# Patient Record
Sex: FEMALE | Race: WHITE | NOT HISPANIC OR LATINO | Employment: FULL TIME | URBAN - METROPOLITAN AREA
[De-identification: names, ages, dates, MRNs, and addresses within clinical notes are randomized per-mention and may not be internally consistent; named-entity substitution may affect disease eponyms.]

---

## 2017-02-27 ENCOUNTER — TRANSCRIBE ORDERS (OUTPATIENT)
Dept: ADMINISTRATIVE | Age: 27
End: 2017-02-27

## 2017-02-27 ENCOUNTER — APPOINTMENT (OUTPATIENT)
Dept: LAB | Age: 27
End: 2017-02-27
Attending: PREVENTIVE MEDICINE
Payer: COMMERCIAL

## 2017-02-27 DIAGNOSIS — Z01.84 IMMUNITY STATUS TESTING: ICD-10-CM

## 2017-02-27 DIAGNOSIS — Z01.84 IMMUNITY STATUS TESTING: Primary | ICD-10-CM

## 2017-02-27 LAB — RUBV IGG SERPL IA-ACNC: 16.4 IU/ML

## 2017-02-27 PROCEDURE — 86765 RUBEOLA ANTIBODY: CPT

## 2017-02-27 PROCEDURE — 86762 RUBELLA ANTIBODY: CPT

## 2017-02-27 PROCEDURE — 86706 HEP B SURFACE ANTIBODY: CPT

## 2017-02-27 PROCEDURE — 86735 MUMPS ANTIBODY: CPT

## 2017-02-27 PROCEDURE — 36415 COLL VENOUS BLD VENIPUNCTURE: CPT

## 2017-02-27 PROCEDURE — 86787 VARICELLA-ZOSTER ANTIBODY: CPT

## 2017-02-28 LAB
HBV SURFACE AB SER-ACNC: 5.32 MIU/ML
MEV IGG SER QL: ABNORMAL
MUV IGG SER QL: ABNORMAL
VZV IGG SER IA-ACNC: NORMAL

## 2017-05-11 ENCOUNTER — GENERIC CONVERSION - ENCOUNTER (OUTPATIENT)
Dept: OTHER | Facility: OTHER | Age: 27
End: 2017-05-11

## 2017-06-07 ENCOUNTER — GENERIC CONVERSION - ENCOUNTER (OUTPATIENT)
Dept: OTHER | Facility: OTHER | Age: 27
End: 2017-06-07

## 2017-07-10 ENCOUNTER — ALLSCRIPTS OFFICE VISIT (OUTPATIENT)
Dept: OTHER | Facility: OTHER | Age: 27
End: 2017-07-10

## 2017-07-10 DIAGNOSIS — I10 ESSENTIAL (PRIMARY) HYPERTENSION: ICD-10-CM

## 2017-07-10 DIAGNOSIS — M79.661 PAIN OF RIGHT LOWER LEG: ICD-10-CM

## 2017-07-11 ENCOUNTER — GENERIC CONVERSION - ENCOUNTER (OUTPATIENT)
Dept: OTHER | Facility: OTHER | Age: 27
End: 2017-07-11

## 2017-07-11 ENCOUNTER — HOSPITAL ENCOUNTER (OUTPATIENT)
Dept: NON INVASIVE DIAGNOSTICS | Facility: HOSPITAL | Age: 27
Discharge: HOME | End: 2017-07-11
Payer: COMMERCIAL

## 2017-07-11 DIAGNOSIS — M79.661 PAIN OF RIGHT LOWER LEG: ICD-10-CM

## 2017-07-11 LAB
ADDITIONAL INFORMATION (HISTORICAL): NORMAL
ADEQUACY: (HISTORICAL): NORMAL
COMMENT (HISTORICAL): NORMAL
CYTOTECHNOLOGIST: (HISTORICAL): NORMAL
GENERAL CATEGORIZATION (HISTORICAL): NORMAL
HPV HIGH RISK (HISTORICAL): NORMAL
HPV MRNA E6/E7 (HISTORICAL): NORMAL
INFECTION (HISTORICAL): NORMAL
INTERPRETATION (HISTORICAL): NORMAL
LMP (HISTORICAL): NORMAL
PATHOLOGIST (HISTORICAL): NORMAL
PREV. BX: (HISTORICAL): NORMAL
PREV. PAP (HISTORICAL): NORMAL
REVIEWED BY (HISTORICAL): NORMAL
SOURCE (HISTORICAL): NORMAL
SPECIMEN STATUS REPORT (HISTORICAL): NORMAL

## 2017-07-11 PROCEDURE — 93971 EXTREMITY STUDY: CPT

## 2017-07-15 ENCOUNTER — APPOINTMENT (OUTPATIENT)
Dept: LAB | Age: 27
End: 2017-07-15
Payer: COMMERCIAL

## 2017-07-15 ENCOUNTER — TRANSCRIBE ORDERS (OUTPATIENT)
Dept: ADMINISTRATIVE | Age: 27
End: 2017-07-15

## 2017-07-15 DIAGNOSIS — I10 ESSENTIAL (PRIMARY) HYPERTENSION: ICD-10-CM

## 2017-07-15 LAB
ALBUMIN SERPL BCP-MCNC: 3.7 G/DL (ref 3.5–5)
ALP SERPL-CCNC: 75 U/L (ref 46–116)
ALT SERPL W P-5'-P-CCNC: 77 U/L (ref 12–78)
ANION GAP SERPL CALCULATED.3IONS-SCNC: 8 MMOL/L (ref 4–13)
AST SERPL W P-5'-P-CCNC: 48 U/L (ref 5–45)
BASOPHILS # BLD AUTO: 0.02 THOUSANDS/ΜL (ref 0–0.1)
BASOPHILS NFR BLD AUTO: 0 % (ref 0–1)
BILIRUB SERPL-MCNC: 0.42 MG/DL (ref 0.2–1)
BUN SERPL-MCNC: 11 MG/DL (ref 5–25)
CALCIUM SERPL-MCNC: 8.9 MG/DL (ref 8.3–10.1)
CHLORIDE SERPL-SCNC: 107 MMOL/L (ref 100–108)
CHOLEST SERPL-MCNC: 164 MG/DL (ref 50–200)
CO2 SERPL-SCNC: 23 MMOL/L (ref 21–32)
CREAT SERPL-MCNC: 0.67 MG/DL (ref 0.6–1.3)
EOSINOPHIL # BLD AUTO: 0.16 THOUSAND/ΜL (ref 0–0.61)
EOSINOPHIL NFR BLD AUTO: 2 % (ref 0–6)
ERYTHROCYTE [DISTWIDTH] IN BLOOD BY AUTOMATED COUNT: 14.2 % (ref 11.6–15.1)
GFR SERPL CREATININE-BSD FRML MDRD: >60 ML/MIN/1.73SQ M
GLUCOSE P FAST SERPL-MCNC: 71 MG/DL (ref 65–99)
HCT VFR BLD AUTO: 41.9 % (ref 34.8–46.1)
HDLC SERPL-MCNC: 32 MG/DL (ref 40–60)
HGB BLD-MCNC: 13.7 G/DL (ref 11.5–15.4)
LDLC SERPL CALC-MCNC: 69 MG/DL (ref 0–100)
LYMPHOCYTES # BLD AUTO: 2.56 THOUSANDS/ΜL (ref 0.6–4.47)
LYMPHOCYTES NFR BLD AUTO: 30 % (ref 14–44)
MCH RBC QN AUTO: 28.7 PG (ref 26.8–34.3)
MCHC RBC AUTO-ENTMCNC: 32.7 G/DL (ref 31.4–37.4)
MCV RBC AUTO: 88 FL (ref 82–98)
MONOCYTES # BLD AUTO: 0.39 THOUSAND/ΜL (ref 0.17–1.22)
MONOCYTES NFR BLD AUTO: 5 % (ref 4–12)
NEUTROPHILS # BLD AUTO: 5.28 THOUSANDS/ΜL (ref 1.85–7.62)
NEUTS SEG NFR BLD AUTO: 63 % (ref 43–75)
NRBC BLD AUTO-RTO: 0 /100 WBCS
PLATELET # BLD AUTO: 235 THOUSANDS/UL (ref 149–390)
PMV BLD AUTO: 11.1 FL (ref 8.9–12.7)
POTASSIUM SERPL-SCNC: 4 MMOL/L (ref 3.5–5.3)
PROT SERPL-MCNC: 7.4 G/DL (ref 6.4–8.2)
RBC # BLD AUTO: 4.78 MILLION/UL (ref 3.81–5.12)
SODIUM SERPL-SCNC: 138 MMOL/L (ref 136–145)
TRIGL SERPL-MCNC: 317 MG/DL
TSH SERPL DL<=0.05 MIU/L-ACNC: 2.09 UIU/ML (ref 0.36–3.74)
WBC # BLD AUTO: 8.42 THOUSAND/UL (ref 4.31–10.16)

## 2017-07-15 PROCEDURE — 85025 COMPLETE CBC W/AUTO DIFF WBC: CPT

## 2017-07-15 PROCEDURE — 84443 ASSAY THYROID STIM HORMONE: CPT

## 2017-07-15 PROCEDURE — 36415 COLL VENOUS BLD VENIPUNCTURE: CPT

## 2017-07-15 PROCEDURE — 80061 LIPID PANEL: CPT

## 2017-07-15 PROCEDURE — 80053 COMPREHEN METABOLIC PANEL: CPT

## 2017-07-17 ENCOUNTER — GENERIC CONVERSION - ENCOUNTER (OUTPATIENT)
Dept: OTHER | Facility: OTHER | Age: 27
End: 2017-07-17

## 2017-07-31 ENCOUNTER — ALLSCRIPTS OFFICE VISIT (OUTPATIENT)
Dept: OTHER | Facility: OTHER | Age: 27
End: 2017-07-31

## 2017-10-30 ENCOUNTER — ALLSCRIPTS OFFICE VISIT (OUTPATIENT)
Dept: OTHER | Facility: OTHER | Age: 27
End: 2017-10-30

## 2018-01-10 NOTE — RESULT NOTES
Verified Results  (1) CBC/PLT/DIFF [id number] 10:26AM [facility] Order Number: [id number]     Test Name Result Flag Reference   WBC COUNT 8 42 Thousand/uL  4 31-10 16   RBC COUNT 4 78 Million/uL  3 81-5 12   HEMOGLOBIN 13 7 g/dL  11 5-15 4   HEMATOCRIT 41 9 %  34 8-46  1   MCV 88 fL  82-98   MCH 28 7 pg  26 8-34 3   MCHC 32 7 g/dL  31 4-37 4   RDW 14 2 %  11 6-15 1   MPV 11 1 fL  8 9-12 7   PLATELET COUNT 826 Thousands/uL  149-390   nRBC AUTOMATED 0 /100 WBCs     NEUTROPHILS RELATIVE PERCENT 63 %  43-75   LYMPHOCYTES RELATIVE PERCENT 30 %  14-44   MONOCYTES RELATIVE PERCENT 5 %  4-12   EOSINOPHILS RELATIVE PERCENT 2 %  0-6   BASOPHILS RELATIVE PERCENT 0 %  0-1   NEUTROPHILS ABSOLUTE COUNT 5 28 Thousands/? ??L  1 85-7 62   LYMPHOCYTES ABSOLUTE COUNT 2 56 Thousands/? ??L  0 60-4 47   MONOCYTES ABSOLUTE COUNT 0 39 Thousand/? ??L  0 17-1 22   EOSINOPHILS ABSOLUTE COUNT 0 16 Thousand/? ??L  0 00-0 61   BASOPHILS ABSOLUTE COUNT 0 02 Thousands/? ??L  0 00-0 10     (1) COMPREHENSIVE METABOLIC PANEL [id number] 40:72ST [facility] Order Number: [id number]     Test Name Result Flag Reference   SODIUM 138 mmol/L  136-145   POTASSIUM 4 0 mmol/L  3 5-5 3   CHLORIDE 107 mmol/L  100-108   CARBON DIOXIDE 23 mmol/L  21-32   ANION GAP (CALC) 8 mmol/L  4-13   BLOOD UREA NITROGEN 11 mg/dL  5-25   CREATININE 0 67 mg/dL  0 60-1 30   Standardized to IDMS reference method   CALCIUM 8 9 mg/dL  8 3-10 1   BILI, TOTAL 0 42 mg/dL  0 20-1 00   ALK PHOSPHATAS 75 U/L     ALT (SGPT) 77 U/L  12-78   AST(SGOT) 48 U/L H 5-45   ALBUMIN 3 7 g/dL  3 5-5 0   TOTAL PROTEIN 7 4 g/dL  6 4-8 2   eGFR Non-African American      >60 0 ml/min/1 73[facility] Disease Education Program recommendations are as follows:  GFR calculation is accurate only with a steady state creatinine  Chronic Kidney disease less than 60 ml/min/1 73 sq  meters  Kidney failure less than 15 ml/min/1 73 sq  meters     GLUCOSE FASTING 71 mg/dL 65-99     (1) LIPID PANEL FASTING W DIRECT LDL REFLEX [id number] 10:26AM Kaveh Fisher Order Number: [id number]     Test Name Result Flag Reference   CHOLESTEROL 164 mg/dL     LDL CHOLESTEROL CALCULATED 69 mg/dL  0-100   Triglyceride:         Normal              <150 mg/dl       Borderline High    150-199 mg/dl       High               200-499 mg/dl       Very High          >499 mg/dl  Cholesterol:         Desirable        <200 mg/dl      Borderline High  200-239 mg/dl      High             >239 mg/dl  HDL Cholesterol:        High    >59 mg/dL      Low     <41 mg/dL  LDL Cholesterol:        Optimal          <100 mg/dl        Near Optimal     100-129 mg/dl        Above Optimal          Borderline High   130-159 mg/dl          High              160-189 mg/dl          Very High        >189 mg/dl  LDL CALCULATED:    This screening LDL is a calculated result  It does not have the accuracy of the Direct Measured LDL in the monitoring of patients with hyperlipidemia and/or statin therapy  Direct Measure LDL (JVJ977) must be ordered separately in these patients  TRIGLYCERIDES 317 mg/dL H <=150   Specimen collection should occur prior to N-Acetylcysteine or Metamizole administration due to the potential for falsely depressed results  HDL,DIRECT 32 mg/dL L 40-60   Specimen collection should occur prior to Metamizole administration due to the potential for falsely depressed results  (1) TSH WITH FT4 REFLEX [id number] 10:26AM Mirzakamila Burns   [facility] Order Number: [id number]     Test Name Result Flag Reference   TSH 2 090 uIU/mL  0 358-3 740   Patients undergoing fluorescein dye angiography may retain small amounts of fluorescein in the body for 48-72 hours post procedure  Samples containing fluorescein can produce falsely depressed TSH values  If the patient had this procedure,a specimen should be resubmitted post fluorescein clearance            The recommended reference ranges for TSH during pregnancy are as follows:  First trimester 0 1 to 2 5 uIU/mL  Second trimester  0 2 to 3 0 uIU/mL  Third trimester 0 3 to 3 0 uIU/m

## 2018-01-13 VITALS
HEART RATE: 90 BPM | WEIGHT: 207.25 LBS | RESPIRATION RATE: 18 BRPM | HEIGHT: 69 IN | TEMPERATURE: 98 F | DIASTOLIC BLOOD PRESSURE: 80 MMHG | SYSTOLIC BLOOD PRESSURE: 128 MMHG | BODY MASS INDEX: 30.7 KG/M2 | OXYGEN SATURATION: 98 %

## 2018-01-13 VITALS
DIASTOLIC BLOOD PRESSURE: 100 MMHG | HEART RATE: 104 BPM | SYSTOLIC BLOOD PRESSURE: 140 MMHG | WEIGHT: 211.13 LBS | BODY MASS INDEX: 31.27 KG/M2 | HEIGHT: 69 IN | OXYGEN SATURATION: 98 %

## 2018-01-15 NOTE — MISCELLANEOUS
Provider Comments  Provider Comments:   pt no showed appt   10/30/2017      Signatures   Electronically signed by : Grazyna Jovel DO; Oct 30 2017  8:04PM EST

## 2022-01-19 ENCOUNTER — APPOINTMENT (OUTPATIENT)
Dept: LAB | Facility: MEDICAL CENTER | Age: 32
End: 2022-01-19

## 2022-03-08 ENCOUNTER — OFFICE VISIT (OUTPATIENT)
Dept: FAMILY MEDICINE CLINIC | Facility: CLINIC | Age: 32
End: 2022-03-08
Payer: COMMERCIAL

## 2022-03-08 VITALS
WEIGHT: 206.13 LBS | TEMPERATURE: 97.9 F | RESPIRATION RATE: 16 BRPM | HEIGHT: 70 IN | DIASTOLIC BLOOD PRESSURE: 60 MMHG | BODY MASS INDEX: 29.51 KG/M2 | OXYGEN SATURATION: 98 % | SYSTOLIC BLOOD PRESSURE: 120 MMHG | HEART RATE: 85 BPM

## 2022-03-08 DIAGNOSIS — J30.2 SEASONAL ALLERGIC RHINITIS, UNSPECIFIED TRIGGER: ICD-10-CM

## 2022-03-08 DIAGNOSIS — Z00.00 HEALTHCARE MAINTENANCE: Primary | ICD-10-CM

## 2022-03-08 DIAGNOSIS — F41.9 ANXIETY: ICD-10-CM

## 2022-03-08 DIAGNOSIS — I10 HYPERTENSION, UNSPECIFIED TYPE: ICD-10-CM

## 2022-03-08 DIAGNOSIS — R00.2 PALPITATIONS: ICD-10-CM

## 2022-03-08 PROCEDURE — 99385 PREV VISIT NEW AGE 18-39: CPT | Performed by: NURSE PRACTITIONER

## 2022-03-08 RX ORDER — CITALOPRAM 20 MG/1
20 TABLET ORAL DAILY
Qty: 90 TABLET | Refills: 0 | Status: SHIPPED | OUTPATIENT
Start: 2022-03-08 | End: 2022-08-01 | Stop reason: SDUPTHER

## 2022-03-08 RX ORDER — BUSPIRONE HYDROCHLORIDE 10 MG/1
10 TABLET ORAL 2 TIMES DAILY
COMMUNITY
Start: 2021-05-25 | End: 2022-04-28

## 2022-03-08 RX ORDER — COPPER 313.4 MG/1
1 INTRAUTERINE DEVICE INTRAUTERINE
COMMUNITY

## 2022-03-08 RX ORDER — MONTELUKAST SODIUM 10 MG/1
10 TABLET ORAL
Qty: 90 TABLET | Refills: 0 | Status: SHIPPED | OUTPATIENT
Start: 2022-03-08 | End: 2022-06-06

## 2022-03-08 RX ORDER — MONTELUKAST SODIUM 10 MG/1
10 TABLET ORAL
COMMUNITY
Start: 2021-05-25 | End: 2022-03-08 | Stop reason: SDUPTHER

## 2022-03-08 RX ORDER — LISINOPRIL 5 MG/1
5 TABLET ORAL DAILY
Qty: 90 TABLET | Refills: 0 | Status: SHIPPED | OUTPATIENT
Start: 2022-03-08 | End: 2022-06-30 | Stop reason: SDUPTHER

## 2022-03-08 RX ORDER — CITALOPRAM 20 MG/1
20 TABLET ORAL DAILY
COMMUNITY
Start: 2021-10-01 | End: 2022-03-08 | Stop reason: SDUPTHER

## 2022-03-08 RX ORDER — LISINOPRIL 5 MG/1
5 TABLET ORAL DAILY
COMMUNITY
Start: 2021-07-22 | End: 2022-03-08 | Stop reason: SDUPTHER

## 2022-03-08 RX ORDER — METHOCARBAMOL 500 MG/1
TABLET, FILM COATED ORAL
COMMUNITY
End: 2022-04-28

## 2022-03-08 NOTE — ASSESSMENT & PLAN NOTE
- Discussed immunizations, screenings, healthy diet, exercise, and safety measures  - She is up to date with her dental, vision, and GYN exams   - Routine blood work ordered  Will contact patient with results

## 2022-03-08 NOTE — PROGRESS NOTES
Assessment/Plan:    Seasonal allergic rhinitis  - Stable  - Refill sent for Singulair   - Will continue to monitor  Hypertension  - Well controlled on lisinopril 5 mg daily  Continue same    - Will continue to monitor  Palpitations  - Will obtain 48 hour holter monitor   - Consider referral to Cardiology based on results  Healthcare maintenance  - Discussed immunizations, screenings, healthy diet, exercise, and safety measures  - She is up to date with her dental, vision, and GYN exams   - Routine blood work ordered  Will contact patient with results  Anxiety  - Well controlled on Celexa 20 mg daily  Continue same    - Will continue to monitor  Diagnoses and all orders for this visit:    Healthcare maintenance  -     CBC and differential; Future  -     Comprehensive metabolic panel; Future  -     Lipid Panel with Direct LDL reflex; Future  -     TSH, 3rd generation with Free T4 reflex; Future    Palpitations  -     Holter monitor; Future    Hypertension, unspecified type  -     CBC and differential; Future  -     Comprehensive metabolic panel; Future  -     Lipid Panel with Direct LDL reflex; Future  -     TSH, 3rd generation with Free T4 reflex; Future  -     lisinopril (ZESTRIL) 5 mg tablet; Take 1 tablet (5 mg total) by mouth daily    Anxiety  -     citalopram (CeleXA) 20 mg tablet; Take 1 tablet (20 mg total) by mouth daily    Seasonal allergic rhinitis, unspecified trigger  -     montelukast (SINGULAIR) 10 mg tablet; Take 1 tablet (10 mg total) by mouth daily at bedtime    Other orders  -     Discontinue: lisinopril (ZESTRIL) 5 mg tablet; Take 5 mg by mouth daily  -     Discontinue: citalopram (CeleXA) 20 mg tablet; Take 20 mg by mouth daily  -     methocarbamol (ROBAXIN) 500 mg tablet  -     Discontinue: montelukast (SINGULAIR) 10 mg tablet; Take 10 mg by mouth  -     intrauterine copper (PARAGARD) IUD; 1 each by Intrauterine route  -     busPIRone (BUSPAR) 10 mg tablet;  Take 10 mg by mouth 2 (two) times a day (Patient not taking: Reported on 3/8/2022 )        Subjective:      Patient ID: Divina Rocha is a 36 y o  female  Patient with past medical history of hypertension, anxiety, and seasonal allergies presents today to establish care  She has complaints today of palpitations and headaches  The headaches are well controlled taking over the counter ibuprofen  The palpitations have been going on for about 4 years  She states that the palpitation episodes last about 5 minutes  She does sometimes feel lightheaded and like she is going to pass out  She had coworkers take her pulse during one of these episodes and she states it was in the 200's  She denies any chest pain or shortness of breath with these episodes  Hypertension  This is a chronic problem  The current episode started more than 1 month ago  The problem is unchanged  The problem is controlled  Associated symptoms include anxiety, headaches, malaise/fatigue and palpitations  Pertinent negatives include no blurred vision, chest pain, neck pain, orthopnea, peripheral edema, PND, shortness of breath or sweats  There are no associated agents to hypertension  There are no known risk factors for coronary artery disease  There are no compliance problems  The following portions of the patient's history were reviewed and updated as appropriate: allergies, current medications, past family history, past medical history, past social history, past surgical history and problem list     Review of Systems   Constitutional: Positive for malaise/fatigue  Negative for chills and fever  HENT: Negative for congestion, rhinorrhea and sore throat  Eyes: Negative for blurred vision, pain and visual disturbance  Respiratory: Negative for chest tightness and shortness of breath  Cardiovascular: Positive for palpitations  Negative for chest pain, orthopnea and PND     Gastrointestinal: Negative for abdominal distention, abdominal pain and constipation  Genitourinary: Negative for difficulty urinating, frequency and pelvic pain  Musculoskeletal: Negative for arthralgias and neck pain  Skin: Negative for rash  Allergic/Immunologic: Positive for environmental allergies  Neurological: Positive for headaches  Negative for dizziness and syncope  Psychiatric/Behavioral: Negative for behavioral problems  The patient is nervous/anxious  Objective:      /60 (BP Location: Left arm, Patient Position: Sitting, Cuff Size: Large)   Pulse 85   Temp 97 9 °F (36 6 °C) (Tympanic)   Resp 16   Ht 5' 10" (1 778 m)   Wt 93 5 kg (206 lb 2 oz)   SpO2 98%   BMI 29 58 kg/m²          Physical Exam  Vitals and nursing note reviewed  Constitutional:       General: She is not in acute distress  Appearance: Normal appearance  She is not ill-appearing  HENT:      Head: Normocephalic and atraumatic  Right Ear: Tympanic membrane, ear canal and external ear normal       Left Ear: Tympanic membrane, ear canal and external ear normal       Nose: No congestion  Eyes:      Extraocular Movements: Extraocular movements intact  Conjunctiva/sclera: Conjunctivae normal       Pupils: Pupils are equal, round, and reactive to light  Cardiovascular:      Rate and Rhythm: Normal rate and regular rhythm  Heart sounds: Normal heart sounds  Pulmonary:      Effort: Pulmonary effort is normal       Breath sounds: Normal breath sounds  Abdominal:      General: Bowel sounds are normal       Palpations: Abdomen is soft  Tenderness: There is no abdominal tenderness  Musculoskeletal:         General: Normal range of motion  Cervical back: Normal range of motion  Right lower leg: No edema  Left lower leg: No edema  Lymphadenopathy:      Cervical: No cervical adenopathy  Skin:     General: Skin is warm and dry  Neurological:      Mental Status: She is alert and oriented to person, place, and time        Cranial Nerves: No cranial nerve deficit  Motor: Motor function is intact  Psychiatric:         Mood and Affect: Mood normal          Behavior: Behavior normal        BMI Counseling: Body mass index is 29 58 kg/m²  The BMI is above normal  Nutrition recommendations include decreasing portion sizes, encouraging healthy choices of fruits and vegetables, decreasing fast food intake and reducing intake of cholesterol  Exercise recommendations include moderate physical activity 150 minutes/week and exercising 3-5 times per week  Rationale for BMI follow-up plan is due to patient being overweight or obese  Depression Screening and Follow-up Plan: Patient was screened for depression during today's encounter  They screened negative with a PHQ-2 score of 2

## 2022-04-13 ENCOUNTER — TELEPHONE (OUTPATIENT)
Dept: HEMATOLOGY ONCOLOGY | Facility: CLINIC | Age: 32
End: 2022-04-13

## 2022-04-13 NOTE — TELEPHONE ENCOUNTER
New Patient Intake Form   Patient Details:    Domo Cabrera  1990    Appointment Information   Who is calling to schedule? Patient   If not self, what is the caller's name? Please put name of RBC nurse as well  DID YOU CONFIRM INSURANCE WITH PATIENT? yes   Referring provider self   What is the diagnosis? Iron def      Is there a confirmed tissue diagnosis? No   Is there a biopsy ordered or pending? Please specify dates   n/a     Is patient aware of diagnosis? Yes   Have you had any imaging or labs done? If yes, where? (If imaging done outside of [facility], please remind patient to bring a disk ) Yes     01/19     If imaging done at outside facility, did you instruct patient to obtain discs and bring to visit? n/a   Have you been seen by another Oncologist/Hematologist?  If so, who and where? no   Are the records in [facility] or Care Everywhere? yes   Does the patient have records at another facility/hospital? no   If yes, Name of facility, city and state where facility is located  Did you instruct patient to have records faxed to [facility] and provide rightfax number? n/a   Preferred [place]   Is the patient willing to be seen by another provider? (This is for breast patients only) n/a     Did you send new patient paperwork?   Email or mail? no   Miscellaneous Information: appt made for 05/20

## 2022-04-14 ENCOUNTER — TELEPHONE (OUTPATIENT)
Dept: HEMATOLOGY ONCOLOGY | Facility: CLINIC | Age: 32
End: 2022-04-14

## 2022-04-14 NOTE — TELEPHONE ENCOUNTER
Called and left a message explaining that Padmini Gamez is not going to be staying in the [place] office and that we wanted to move her appointment over to another provider that is staying in that office  I let her know that we will give her another call tomorrow but also left the [facility] line number  She in an employee of [facility] [employer] [facility] so also mentioned she could message us through My Chart as well

## 2022-04-28 ENCOUNTER — CONSULT (OUTPATIENT)
Dept: HEMATOLOGY ONCOLOGY | Facility: CLINIC | Age: 32
End: 2022-04-28
Payer: COMMERCIAL

## 2022-04-28 VITALS
WEIGHT: 212.5 LBS | DIASTOLIC BLOOD PRESSURE: 80 MMHG | TEMPERATURE: 98.4 F | RESPIRATION RATE: 17 BRPM | OXYGEN SATURATION: 98 % | BODY MASS INDEX: 30.42 KG/M2 | HEART RATE: 111 BPM | SYSTOLIC BLOOD PRESSURE: 150 MMHG | HEIGHT: 70 IN

## 2022-04-28 DIAGNOSIS — R00.2 PALPITATIONS: Primary | ICD-10-CM

## 2022-04-28 DIAGNOSIS — R23.2 FLUSHING: ICD-10-CM

## 2022-04-28 DIAGNOSIS — R76.8 ANA POSITIVE: ICD-10-CM

## 2022-04-28 DIAGNOSIS — R53.83 OTHER FATIGUE: ICD-10-CM

## 2022-04-28 DIAGNOSIS — N92.4 EXCESSIVE BLEEDING IN PREMENOPAUSAL PERIOD: ICD-10-CM

## 2022-04-28 DIAGNOSIS — I10 HYPERTENSION, UNSPECIFIED TYPE: ICD-10-CM

## 2022-04-28 PROBLEM — N92.0 MENORRHAGIA: Status: ACTIVE | Noted: 2022-04-28

## 2022-04-28 PROCEDURE — 99244 OFF/OP CNSLTJ NEW/EST MOD 40: CPT | Performed by: PHYSICIAN ASSISTANT

## 2022-04-28 NOTE — PROGRESS NOTES
Oncology Outpatient Consult Note  Luis Randolph 29 y o  female MRN: @ Encounter: [id number]        Date:  4/28/2022      Assessment/ Plan:    History of positive ALINE 1:640 nucleolar pattern 5/2021  Evaluated by rheumatology  Other test results unremarkable  Menorrhagia  Will check iron levels  Labile HTN despite medication  Excessive sweating and HA  Palpitations  Is scheduled for Holter monitor 5/18/22 and evaluation with cardiology 5/26/22  Normal TSH and TPO antibodies  Will investigate for underlying potential disorders which could be causing/ contributing to symptoms  HPI:  Luis Randolph is a 29 y o  seen for initial consultation 4/28/2022   Established care with new PCP 3/8/22  She has PMH hypertension, anxiety, and seasonal allergies  H/o mild SBO 10/16/2020 with enteritis noted  NGT placed and she had resolution of symptoms  She has recurrent HAs and has been experiencing palpitations for 4+ years  When she has the palpitations she can have feelings of lightheadedness and that she is going to pass out  Her pulse can increase to the 200s  No chest pain or shortness of breath with these episodes  She does experience dry eyes, dry mouth, low back pain, most pronounced in AM, occasional constipation  She has chronic sweating day and night, no fevers or chills  Face is always flushed  5/2021 normal Sjogrens and scleroderma antibodies, smith antibodies, DS DNA  ALINE 1:640  Labs were ordered by Richa Mora DO due to rosacea and photosensitivity  Referred to rheumatology and dermatology  Evaluated by Brooklyn Bruno DO with [facility] rheumatology  Additional labs  10/2021 - no monoclonal band on SPEP  Normal CRP, C3 and C4 complement, RF, ESR, PM-SCL antibody, CCP antibody, TPO antibody  Hemoglobin 14 4, MCV 89, WBC 7 6, platelet count 010    TSH 1 27    10/2021:  normal SIJ on xray    4/9/22:  hemoglobin 14, MCV 88, WBC 9 5, platelet count 251   CMP normal with exception of ALT 66  TSH 1 53                    Test Results:      Labs:   Lab Results   Component Value Date    HGB 13 7 07/15/2017    HCT 41 9 07/15/2017    MCV 88 07/15/2017     07/15/2017    WBC 8 42 07/15/2017    NRBC 0 07/15/2017     Lab Results   Component Value Date    K 4 0 07/15/2017     07/15/2017    CO2 23 07/15/2017    BUN 11 07/15/2017    CREATININE 0 67 07/15/2017    GLUF 71 07/15/2017    CALCIUM 8 9 07/15/2017    AST 48 (H) 07/15/2017    ALT 77 07/15/2017    ALKPHOS 75 07/15/2017    EGFR >60 0 07/15/2017           Imaging:   No results found  ROS: As mentioned in HPI & Interval History otherwise 14 point ROS negative  Active Problems:   Patient Active Problem List   Diagnosis    Healthcare maintenance    Palpitations    Hypertension    Anxiety    Seasonal allergic rhinitis       Past Medical History:   Past Medical History:   Diagnosis Date    Allergic     Anxiety     Depression     Hypertension        Surgical History: No past surgical history on file  Family History:    Family History   Problem Relation Age of Onset    Depression Mother     Thyroid disease Mother     Hypertension Mother     Depression Father     Depression Maternal Grandmother     Thyroid disease Maternal Grandmother     Hypertension Maternal Grandmother        Cancer-related family history is not on file  Social History:   Social History     Socioeconomic History    Marital status: Single     Spouse name: Not on file    Number of children: Not on file    Years of education: Not on file    Highest education level: Not on file   Occupational History    Not on file   Tobacco Use    Smoking status: Never Smoker    Smokeless tobacco: Never Used   Vaping Use    Vaping Use: Never used   Substance and Sexual Activity    Alcohol use:  Yes     Alcohol/week: 2 0 standard drinks     Types: 2 Glasses of wine per week     Comment: socially    Drug use: Never    Sexual activity: Not on file   Other Topics Concern    Not on file   Social History Narrative    Not on file     Social Determinants of Health     Financial Resource Strain: Not on file   Food Insecurity: Not on file   Transportation Needs: Not on file   Physical Activity: Not on file   Stress: Not on file   Social Connections: Not on file   Intimate Partner Violence: Not on file   Housing Stability: Not on file       Current Medications:   Current Outpatient Medications   Medication Sig Dispense Refill    busPIRone (BUSPAR) 10 mg tablet Take 10 mg by mouth 2 (two) times a day (Patient not taking: Reported on 3/8/2022 )      citalopram (CeleXA) 20 mg tablet Take 1 tablet (20 mg total) by mouth daily 90 tablet 0    intrauterine copper (PARAGARD) IUD 1 each by Intrauterine route      lisinopril (ZESTRIL) 5 mg tablet Take 1 tablet (5 mg total) by mouth daily 90 tablet 0    methocarbamol (ROBAXIN) 500 mg tablet       montelukast (SINGULAIR) 10 mg tablet Take 1 tablet (10 mg total) by mouth daily at bedtime 90 tablet 0     No current facility-administered medications for this visit  Allergies: No Known Allergies      Physical Exam:    There is no height or weight on file to calculate BSA  Ht Readings from Last 3 Encounters:   03/08/22 5' 10" (1 778 m)   07/31/17 5' 8 5" (1 74 m)   07/10/17 5' 8 5" (1 74 m)       Wt Readings from Last 3 Encounters:   03/08/22 93 5 kg (206 lb 2 oz)   07/31/17 94 kg (207 lb 4 oz)   07/10/17 95 8 kg (211 lb 2 1 oz)        Temp Readings from Last 3 Encounters:   03/08/22 97 9 °F (36 6 °C) (Tympanic)   07/31/17 98 °F (36 7 °C) (Tympanic)   03/07/16 99 4 °F (37 4 °C)        BP Readings from Last 3 Encounters:   03/08/22 120/60   07/31/17 128/80   07/10/17 140/100         Pulse Readings from Last 3 Encounters:   03/08/22 85   07/31/17 90   07/10/17 104         Physical Exam    Physical Exam  Vitals reviewed  Constitutional:       General: She is not in acute distress  Appearance: She is well-developed  She is not diaphoretic  HENT:      Head: Normocephalic and atraumatic  Eyes:      Conjunctiva/sclera: Conjunctivae normal    Neck:      Trachea: No tracheal deviation  Cardiovascular:      Rate and Rhythm: Normal rate and regular rhythm  Heart sounds: Normal heart sounds  No murmur heard  No friction rub  No gallop  Pulmonary:      Effort: Pulmonary effort is normal  No respiratory distress  Breath sounds: Normal breath sounds  No wheezing, rhonchi or rales  Chest:      Chest wall: No tenderness  Abdominal:      General: There is no distension  Palpations: Abdomen is soft  There is no mass  Tenderness: There is no abdominal tenderness  Musculoskeletal:      Cervical back: Normal range of motion and neck supple  Lymphadenopathy:      Cervical: No cervical adenopathy  Skin:     General: Skin is warm and dry  Coloration: Skin is not pale  Findings: No erythema  Comments: Flushing of cheeks   Neurological:      Mental Status: She is alert and oriented to person, place, and time  Psychiatric:         Behavior: Behavior normal          Thought Content: Thought content normal          Judgment: Judgment normal              Emergency Contacts:    No emergency contact information on file

## 2022-04-29 ENCOUNTER — APPOINTMENT (OUTPATIENT)
Dept: LAB | Facility: HOSPITAL | Age: 32
End: 2022-04-29
Payer: COMMERCIAL

## 2022-04-29 DIAGNOSIS — R23.2 FLUSHING: ICD-10-CM

## 2022-04-29 DIAGNOSIS — R53.83 OTHER FATIGUE: ICD-10-CM

## 2022-04-29 DIAGNOSIS — R76.8 ANA POSITIVE: ICD-10-CM

## 2022-04-29 DIAGNOSIS — N92.4 EXCESSIVE BLEEDING IN PREMENOPAUSAL PERIOD: ICD-10-CM

## 2022-04-29 DIAGNOSIS — I10 HYPERTENSION, UNSPECIFIED TYPE: ICD-10-CM

## 2022-04-29 DIAGNOSIS — R00.2 PALPITATIONS: ICD-10-CM

## 2022-04-29 DIAGNOSIS — Z00.8 ENCOUNTER FOR OTHER GENERAL EXAMINATION: ICD-10-CM

## 2022-04-29 LAB
25(OH)D3 SERPL-MCNC: 20.4 NG/ML (ref 30–100)
CHOLEST SERPL-MCNC: 167 MG/DL
CORTIS AM PEAK SERPL-MCNC: 16.5 UG/DL (ref 4.2–22.4)
CRP SERPL QL: 4.2 MG/L
EST. AVERAGE GLUCOSE BLD GHB EST-MCNC: 103 MG/DL
FERRITIN SERPL-MCNC: 55 NG/ML (ref 8–388)
HBA1C MFR BLD: 5.2 %
HDLC SERPL-MCNC: 33 MG/DL
IGA SERPL-MCNC: 200 MG/DL (ref 70–400)
IGG SERPL-MCNC: 1320 MG/DL (ref 700–1600)
IGM SERPL-MCNC: 78 MG/DL (ref 40–230)
IRON SATN MFR SERPL: 20 % (ref 15–50)
IRON SERPL-MCNC: 75 UG/DL (ref 50–170)
LDH SERPL-CCNC: 176 U/L (ref 81–234)
LDLC SERPL CALC-MCNC: 110 MG/DL (ref 0–100)
NONHDLC SERPL-MCNC: 134 MG/DL
TIBC SERPL-MCNC: 370 UG/DL (ref 250–450)
TOTAL IGE SMQN RAST: 35.8 KU/L (ref 0–113)
TRIGL SERPL-MCNC: 118 MG/DL

## 2022-04-29 PROCEDURE — 36415 COLL VENOUS BLD VENIPUNCTURE: CPT

## 2022-04-29 PROCEDURE — 82728 ASSAY OF FERRITIN: CPT

## 2022-04-29 PROCEDURE — 86038 ANTINUCLEAR ANTIBODIES: CPT

## 2022-04-29 PROCEDURE — 82785 ASSAY OF IGE: CPT

## 2022-04-29 PROCEDURE — 82306 VITAMIN D 25 HYDROXY: CPT

## 2022-04-29 PROCEDURE — 83520 IMMUNOASSAY QUANT NOS NONAB: CPT

## 2022-04-29 PROCEDURE — 83615 LACTATE (LD) (LDH) ENZYME: CPT

## 2022-04-29 PROCEDURE — 83036 HEMOGLOBIN GLYCOSYLATED A1C: CPT

## 2022-04-29 PROCEDURE — 83540 ASSAY OF IRON: CPT

## 2022-04-29 PROCEDURE — 83550 IRON BINDING TEST: CPT

## 2022-04-29 PROCEDURE — 80061 LIPID PANEL: CPT

## 2022-04-29 PROCEDURE — 86140 C-REACTIVE PROTEIN: CPT

## 2022-04-29 PROCEDURE — 82784 ASSAY IGA/IGD/IGG/IGM EACH: CPT

## 2022-04-29 PROCEDURE — 82533 TOTAL CORTISOL: CPT

## 2022-04-29 PROCEDURE — 86316 IMMUNOASSAY TUMOR OTHER: CPT

## 2022-04-29 PROCEDURE — 81374 HLA I TYPING 1 ANTIGEN LR: CPT

## 2022-05-02 LAB
ANA TITR SER IF: NEGATIVE {TITER}
TRYPTASE SERPL-MCNC: 1.5 UG/L (ref 2.2–13.2)

## 2022-05-04 LAB — CGA SERPL-MCNC: 26 NG/ML (ref 0–101.8)

## 2022-05-06 LAB — HLA-B27 QL NAA+PROBE: NEGATIVE

## 2022-05-09 ENCOUNTER — APPOINTMENT (OUTPATIENT)
Dept: LAB | Facility: HOSPITAL | Age: 32
End: 2022-05-09
Payer: COMMERCIAL

## 2022-05-09 DIAGNOSIS — R00.2 PALPITATIONS: ICD-10-CM

## 2022-05-09 PROCEDURE — 83835 ASSAY OF METANEPHRINES: CPT

## 2022-05-09 PROCEDURE — 82384 ASSAY THREE CATECHOLAMINES: CPT

## 2022-05-13 LAB
DOPAMINE 24H UR-MRATE: 160 UG/24 HR (ref 0–510)
DOPAMINE UR-MCNC: 139 UG/L
EPINEPH 24H UR-MRATE: 3 UG/24 HR (ref 0–20)
EPINEPH UR-MCNC: 3 UG/L
NOREPINEPH 24H UR-MRATE: 24 UG/24 HR (ref 0–135)
NOREPINEPH UR-MCNC: 21 UG/L

## 2022-05-17 LAB
METANEPH 24H UR-MRATE: 61 UG/24 HR (ref 36–209)
METANEPHS 24H UR-MCNC: 53 UG/L
NORMETANEPHRINE 24H UR-MCNC: 111 UG/L
NORMETANEPHRINE 24H UR-MRATE: 128 UG/24 HR (ref 95–449)

## 2022-05-18 ENCOUNTER — HOSPITAL ENCOUNTER (OUTPATIENT)
Dept: NON INVASIVE DIAGNOSTICS | Facility: CLINIC | Age: 32
Discharge: HOME | End: 2022-05-18
Payer: COMMERCIAL

## 2022-05-18 DIAGNOSIS — R00.2 PALPITATIONS: ICD-10-CM

## 2022-05-18 PROCEDURE — 93226 XTRNL ECG REC<48 HR SCAN A/R: CPT

## 2022-05-18 PROCEDURE — 93225 XTRNL ECG REC<48 HRS REC: CPT

## 2022-05-25 ENCOUNTER — TELEPHONE (OUTPATIENT)
Dept: FAMILY MEDICINE CLINIC | Facility: CLINIC | Age: 32
End: 2022-05-25

## 2022-05-25 PROCEDURE — 93227 XTRNL ECG REC<48 HR R&I: CPT | Performed by: INTERNAL MEDICINE

## 2022-05-25 NOTE — TELEPHONE ENCOUNTER
----- Message from [place], [place] sent at 5/25/2022  2:05 PM EDT -----  Holter monitor showed no significant arrhythmias

## 2022-05-26 ENCOUNTER — TELEPHONE (OUTPATIENT)
Dept: CARDIOLOGY CLINIC | Facility: CLINIC | Age: 32
End: 2022-05-26

## 2022-05-26 ENCOUNTER — OFFICE VISIT (OUTPATIENT)
Dept: CARDIOLOGY CLINIC | Facility: CLINIC | Age: 32
End: 2022-05-26
Payer: COMMERCIAL

## 2022-05-26 VITALS
HEART RATE: 71 BPM | HEIGHT: 70 IN | SYSTOLIC BLOOD PRESSURE: 122 MMHG | DIASTOLIC BLOOD PRESSURE: 78 MMHG | BODY MASS INDEX: 29.92 KG/M2 | WEIGHT: 209 LBS

## 2022-05-26 DIAGNOSIS — I10 HYPERTENSION, UNSPECIFIED TYPE: ICD-10-CM

## 2022-05-26 DIAGNOSIS — R00.2 PALPITATIONS: Primary | ICD-10-CM

## 2022-05-26 PROCEDURE — 99204 OFFICE O/P NEW MOD 45 MIN: CPT | Performed by: INTERNAL MEDICINE

## 2022-05-26 PROCEDURE — 93000 ELECTROCARDIOGRAM COMPLETE: CPT | Performed by: INTERNAL MEDICINE

## 2022-05-26 PROCEDURE — 93244 EXT ECG>48HR<7D REV&INTERPJ: CPT | Performed by: INTERNAL MEDICINE

## 2022-05-26 NOTE — PROGRESS NOTES
[address] [facility]  Office Consultation  Hiral Solis 29 y o  female MRN: [id number]        Chief Complaint    Chief Complaint   Patient presents with    Palpitations     New patient consult    Shortness of Breath       Referring Provider: MONTY Montoya    Impression & Plan:    1  Palpitations    Primary in differential would be supraventricular tachycardia, however, given the rate of 200 bpm and the lightheadedness, would be prudent to rule out ventricular tachycardia  We will check an echo to evaluate for structural abnormalities that may be related to ventricular tachyarrhythmias in a young patient, such as ARVC  We will check a 2 week Zio patch monitor open to capture an episode  We also discussed the [employer] monitor as a possible way to capture an episode  In the meantime, the patient has been educated about Valsalva maneuvers to terminate persistent episodes  - POCT ECG  - Echo complete w/ contrast if indicated; Future  - AMB extended holter monitor; Future    2  Hypertension, unspecified type  Well controlled on current regimen  Continue lisinopril 5 mg daily   - POCT ECG        We will see Hiral Solis back in 6 months for routine follow-up  HPI: Hiral Solis is a 29y o  year old female with no significant past cardiac history who presents for further evaluation of palpitations  She has had palpitations on and off for a decade  She remembers an early episode 9 years ago when pregnant with her son  They increased in frequency with her second pregnancy then reduced again  However, they have been becoming more frequent lately  She had an episode last night in the shower  She notices the symptoms more when she is over exerting herself  She states her pulse with get high and not return to normal despite rest for a prolonged period  She has a hard time pin pointing the frequency, lately as little as 1x or 2x per week   She did not have an episode during her 5/18/22 Holter monitor (below)  She cannot identify definitive triggers  During the episodes she feels that her heart is racing out of control  She gets short of breath  Often now she also feels presyncopal but has never had syncope  She has checked her pulse with a pulse oximeter and measured it as high as 234 bpm once in March  She has not caught any other HR during episodes, 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)  The episode lasts about 5 minutes and terminates suddenly  Afterwards she feels fine with no symptoms, completely back to normal      She had a Holter monitor on 05/18/2022  I personally reviewed the entirety of the tracings and independently reinterpreted this study  It showed sinus rhythm throughout, average heart rate 85 bpm, minimum 51 bpm, maximum 154 bpm   Her ventricular ectopic burden was 0 0%  Her supraventricular ectopic burden was 0 1%  Seventy-one available rhythm strips all reveal sinus rhythm  No symptoms reported  Review of Systems   Constitutional: Negative for activity change and fatigue  HENT: Negative for facial swelling  Eyes: Negative for visual disturbance  Respiratory: Negative for chest tightness and shortness of breath  Cardiovascular: Negative for chest pain, palpitations and leg swelling  Gastrointestinal: Negative for nausea and vomiting  Musculoskeletal: Negative for myalgias  Skin: Negative for pallor  Allergic/Immunologic: Negative for immunocompromised state  Neurological: Negative for dizziness, syncope and light-headedness  Psychiatric/Behavioral: Negative for agitation and confusion  The patient is not nervous/anxious  Past Medical History:   Diagnosis Date    Allergic     Anxiety     Depression     Hypertension      History reviewed  No pertinent surgical history    Social History     Substance and Sexual Activity   Alcohol Use Yes    Alcohol/week: 2 0 standard drinks    Types: 2 Glasses of wine per week    Comment: socially     Social History     Substance and Sexual Activity   Drug Use Never     Social History     Tobacco Use   Smoking Status Never Smoker   Smokeless Tobacco Never Used     Family History   Problem Relation Age of Onset    Depression Mother     Thyroid disease Mother     Hypertension Mother     Depression Father     Depression Maternal Grandmother     Thyroid disease Maternal Grandmother     Hypertension Maternal Grandmother        Allergies:  No Known Allergies    Medications (as of START of this encounter): Outpatient Medications Prior to Visit   Medication Sig Dispense Refill    citalopram (CeleXA) 20 mg tablet Take 1 tablet (20 mg total) by mouth daily 90 tablet 0    intrauterine copper (PARAGARD) IUD 1 each by Intrauterine route      lisinopril (ZESTRIL) 5 mg tablet Take 1 tablet (5 mg total) by mouth daily 90 tablet 0    montelukast (SINGULAIR) 10 mg tablet Take 1 tablet (10 mg total) by mouth daily at bedtime 90 tablet 0     No facility-administered medications prior to visit  Vitals:    05/26/22 0953   BP: 122/78   Pulse: 71     Weight (last 2 days)     Date/Time Weight    05/26/22 0953 94 8 (209)          General: Alcon Boucher is a well appearing female, in no acute distress, sitting comfortably  HEENT: moist mucous membranes, EOMI  Neck:  No JVD, supple, trachea midline   Cardiovascular: unremarkable S1/S2, regular rate and rhythm, no murmurs, rubs or gallops   Pulmonary: normal respiratory effort, CTAB   Abdomen: soft and nondistended  Extremities: No lower extremity edema  Warm and well perfused extremities     Neuro: no focal motor deficits, AAOx3 (person, place, time)  Psych: Normal mood and affect, cooperative      Laboratory Studies:    Laboratory studies personally reviewed  TSH on 04/09/2022 was 1 53  Lipid panel 04/09/2022 showed , , HDL 34,  mg/dL  Urine fractionated metanephrines and catecholamines, 24 hour collection, checked on 05/09/2021 showed all normal values    Cardiac testing:     EKG reviewed personally:   EKG in the office today shows normal sinus rhythm, 71 bpm, normal axis, normal intervals  No ventricular pre-excitation  Normal ST/T  Normal study  Time Spent:  Total time (face-to-face and non-face-to-face) spent on today's visit was 40 minutes  This includes preparation for the visits (i e  reviewing test results), performance of a medically appropriate history and examination, and orders for medications, tests or other procedures  This time is exclusive of procedures performed and time spent teaching  Carlos Manuel Marshall MD    This note was completed in part utilizing [employer] direct voice recognition software  Grammatical errors, random word insertion, spelling mistakes, occasional wrong word or "sound-alike" substitutions and incomplete sentences may be an occasional consequence of the system secondary to software limitations, ambient noise and hardware issues  At the time of dictation, efforts were made to edit, clarify and /or correct errors  Please read the chart carefully and recognize, using context, where substitutions have occurred  If you have any questions or concerns about the context, text or information contained within the body of this dictation, please contact myself, the provider, for further clarification

## 2022-05-26 NOTE — TELEPHONE ENCOUNTER
Dr Aruna Wetzel requested immediate prior auth and placement of zio patch  I called Mardee Ganser and she did prior auth for 2 weeks approval   She will note the account  Aurora put the zio on patient in office

## 2022-05-26 NOTE — PATIENT INSTRUCTIONS
Please go directly to [facility].  If you have any problems in route to the ER stop the car and call 911.   Rose Call Personal EKG Monitor - Black  Model: [id number]  SKU: [id number]

## 2022-06-14 DIAGNOSIS — N92.1 MENORRHAGIA WITH IRREGULAR CYCLE: Primary | ICD-10-CM

## 2022-06-14 RX ORDER — ACETAMINOPHEN AND CODEINE PHOSPHATE 120; 12 MG/5ML; MG/5ML
1 SOLUTION ORAL DAILY
Qty: 90 TABLET | Refills: 0 | Status: SHIPPED | OUTPATIENT
Start: 2022-06-14 | End: 2022-11-04 | Stop reason: ALTCHOICE

## 2022-06-17 ENCOUNTER — CLINICAL SUPPORT (OUTPATIENT)
Dept: CARDIOLOGY CLINIC | Facility: CLINIC | Age: 32
End: 2022-06-17
Payer: COMMERCIAL

## 2022-06-17 DIAGNOSIS — R00.2 PALPITATIONS: ICD-10-CM

## 2022-06-17 PROCEDURE — 93244 EXT ECG>48HR<7D REV&INTERPJ: CPT | Performed by: INTERNAL MEDICINE

## 2022-06-27 ENCOUNTER — HOSPITAL ENCOUNTER (OUTPATIENT)
Dept: NON INVASIVE DIAGNOSTICS | Facility: CLINIC | Age: 32
Discharge: HOME | End: 2022-06-27
Payer: COMMERCIAL

## 2022-06-27 VITALS
HEIGHT: 70 IN | HEART RATE: 80 BPM | SYSTOLIC BLOOD PRESSURE: 122 MMHG | WEIGHT: 209 LBS | BODY MASS INDEX: 29.92 KG/M2 | DIASTOLIC BLOOD PRESSURE: 78 MMHG

## 2022-06-27 DIAGNOSIS — R00.2 PALPITATIONS: ICD-10-CM

## 2022-06-27 LAB
AORTIC ROOT: 2.6 CM
APICAL FOUR CHAMBER EJECTION FRACTION: 53 %
E WAVE DECELERATION TIME: 190 MS
FRACTIONAL SHORTENING: 28 % (ref 28–44)
INTERVENTRICULAR SEPTUM IN DIASTOLE (PARASTERNAL SHORT AXIS VIEW): 1.1 CM
INTERVENTRICULAR SEPTUM: 1.1 CM (ref 0.6–1.1)
LEFT ATRIUM AREA SYSTOLE SINGLE PLANE A4C: 16.8 CM2
LEFT ATRIUM SIZE: 3.4 CM
LEFT INTERNAL DIMENSION IN SYSTOLE: 2.9 CM (ref 2.1–4)
LEFT VENTRICULAR INTERNAL DIMENSION IN DIASTOLE: 4 CM (ref 3.5–6)
LEFT VENTRICULAR POSTERIOR WALL IN END DIASTOLE: 1.1 CM
LEFT VENTRICULAR STROKE VOLUME: 40 ML
LVSV (TEICH): 40 ML
MV E'TISSUE VEL-SEP: 13 CM/S
MV PEAK A VEL: 0.63 M/S
MV PEAK E VEL: 86 CM/S
MV STENOSIS PRESSURE HALF TIME: 55 MS
MV VALVE AREA P 1/2 METHOD: 4 CM2
RIGHT ATRIUM AREA SYSTOLE A4C: 11 CM2
RIGHT VENTRICLE ID DIMENSION: 3.2 CM
SL CV LV EF: 60
SL CV PED ECHO LEFT VENTRICLE DIASTOLIC VOLUME (MOD BIPLANE) 2D: 71 ML
SL CV PED ECHO LEFT VENTRICLE SYSTOLIC VOLUME (MOD BIPLANE) 2D: 32 ML

## 2022-06-27 PROCEDURE — 93306 TTE W/DOPPLER COMPLETE: CPT | Performed by: INTERNAL MEDICINE

## 2022-06-27 PROCEDURE — 93306 TTE W/DOPPLER COMPLETE: CPT

## 2022-06-30 DIAGNOSIS — I10 HYPERTENSION, UNSPECIFIED TYPE: ICD-10-CM

## 2022-06-30 RX ORDER — LISINOPRIL 5 MG/1
5 TABLET ORAL DAILY
Qty: 90 TABLET | Refills: 0 | Status: SHIPPED | OUTPATIENT
Start: 2022-06-30 | End: 2022-09-28

## 2022-07-05 NOTE — RESULT ENCOUNTER NOTE
Ms Petty Plata,     We unfortunately did not capture any episodes of your heart rate going >200 bpm on the Zio patch monitor to determine the cause  Were you able to set up a [facility] device?     Dr Pedro Dobbins

## 2022-07-17 PROBLEM — Z01.419 ENCOUNTER FOR ANNUAL ROUTINE GYNECOLOGICAL EXAMINATION: Status: ACTIVE | Noted: 2022-07-17

## 2022-07-18 ENCOUNTER — OFFICE VISIT (OUTPATIENT)
Dept: OBGYN CLINIC | Facility: CLINIC | Age: 32
End: 2022-07-18
Payer: COMMERCIAL

## 2022-07-18 VITALS
BODY MASS INDEX: 30.21 KG/M2 | HEIGHT: 69 IN | DIASTOLIC BLOOD PRESSURE: 70 MMHG | SYSTOLIC BLOOD PRESSURE: 120 MMHG | WEIGHT: 204 LBS

## 2022-07-18 DIAGNOSIS — I10 HYPERTENSION, UNSPECIFIED TYPE: ICD-10-CM

## 2022-07-18 DIAGNOSIS — N92.6 IRREGULAR MENSES: ICD-10-CM

## 2022-07-18 DIAGNOSIS — Z01.419 ENCOUNTER FOR ANNUAL ROUTINE GYNECOLOGICAL EXAMINATION: Primary | ICD-10-CM

## 2022-07-18 DIAGNOSIS — F32.A DEPRESSION, UNSPECIFIED DEPRESSION TYPE: ICD-10-CM

## 2022-07-18 DIAGNOSIS — F41.9 ANXIETY: ICD-10-CM

## 2022-07-18 PROCEDURE — S0610 ANNUAL GYNECOLOGICAL EXAMINA: HCPCS | Performed by: OBSTETRICS & GYNECOLOGY

## 2022-07-18 NOTE — PROGRESS NOTES
Assessment/Plan:  WILLYPAO  BCM- NA , cycled with Camella since 6/22, Paragard 10/21 to 6/22 -removed because of bleeding  Stress urinary incontinence- discuss Rosalind's next visit  Return to the office for Mirena placement, 2 Advil 1 hour beforehand  Literature given  Menopausal symptoms- gyn cause ruled out, referred to PCP  Diaphoresis is a listed SE of Celexa  Anxiety and depression - treatment success seems to wane after 1-2 years  Treated by PCP  Recommend seeing a psychiatrist   Referral placed  Co Testing q 5 yrs '23  History of bowel obstruction - colonoscopy was recommended but not carried out  RTO 1yr  SBA monthly  Exercise 3/wk  Calcium 1,000 mg/d with Vit D     Depression Screen: Neg       Diagnoses and all orders for this visit:    Encounter for annual routine gynecological examination    Hypertension, unspecified type    Irregular menses    Anxiety  -     Ambulatory Referral to Psychiatry; Future    Depression, unspecified depression type  -     Ambulatory Referral to Psychiatry; Future              Subjective:        Patient ID: Louie Burkitt is a 29 y o  female  Ms Tobin Mcnamara presents today for a yearly evaluation  She has a history of irregular bleeding since the birth of her last child  These have been associated with hot flashes  A normal FSH and TSH were obtained last year  She had a ParaGard IUD placed in October of 2021  This was associated with frequent episodes of light and heavy bleeding  It was removed on June 14th  Last menstrual cycles were June 1st and June 25th  She has a history of hypertension and palpitations  She is interested in a Mirena  The following portions of the patient's history were reviewed and updated as appropriate: She  has a past medical history of Allergic, Anxiety, Depression, and Hypertension    Patient Active Problem List    Diagnosis Date Noted    Irregular menses 07/18/2022    Depression 07/18/2022    Encounter for annual routine gynecological examination 07/17/2022    Menorrhagia 04/28/2022    ALINE positive 04/28/2022    Other fatigue 04/28/2022    Flushing 04/28/2022    Healthcare maintenance 03/08/2022    Palpitations 03/08/2022    Hypertension 03/08/2022    Anxiety 03/08/2022    Seasonal allergic rhinitis 03/08/2022   PMH:  Menarche 17  Anxiety and depression - diagnosed as a teenager  Panic attacks  G2, P2; SAVD x 2 '13 M 8-6, '17 M 7-15  Palpitations '13  [negative eval '22 w Holter, Echo, Zio Patch monitor  Oligomenorrhea '17  HTN '18  Bowel obstruction - admitted for 3 days, NGT 10/20 -colonoscopy planned but not carried out due to COVID  + ALINE - during eval of flashes and arthralgias '21  Menopausal symptoms - normal TSH and [facility] '21  Paragard 10/21 - 6/22  She  has no past surgical history on file  Her family history includes Depression in her father, maternal grandmother, and mother; Hypertension in her maternal grandmother and mother; Thyroid disease in her maternal grandmother and mother  FH:  Hypothyroid - M, MGM  Breast Ca - 2 PA's in their 52's  She  reports that she has never smoked  She has never used smokeless tobacco  She reports current alcohol use of about 2 0 standard drinks of alcohol per week  She reports that she does not use drugs  SH:  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  Works with [employer] and Dr Katherin Youngblood  Not currently in a relationship  Shared custody with her children's father  No local support [reason colonoscopy not scheduled, no one to drive her]    Current Outpatient Medications   Medication Sig Dispense Refill    lisinopril (ZESTRIL) 5 mg tablet Take 1 tablet (5 mg total) by mouth daily 90 tablet 0    norethindrone (Isadora) 0 35 MG tablet Take 1 tablet (0 35 mg total) by mouth daily 90 tablet 0    citalopram (CeleXA) 20 mg tablet Take 1 tablet (20 mg total) by mouth daily 90 tablet 0    intrauterine copper (PARAGARD) IUD 1 each by Intrauterine route  montelukast (SINGULAIR) 10 mg tablet Take 1 tablet (10 mg total) by mouth daily at bedtime 90 tablet 0     No current facility-administered medications for this visit  Current Outpatient Medications on File Prior to Visit   Medication Sig    lisinopril (ZESTRIL) 5 mg tablet Take 1 tablet (5 mg total) by mouth daily    norethindrone (Isadora) 0 35 MG tablet Take 1 tablet (0 35 mg total) by mouth daily    citalopram (CeleXA) 20 mg tablet Take 1 tablet (20 mg total) by mouth daily    intrauterine copper (PARAGARD) IUD 1 each by Intrauterine route    montelukast (SINGULAIR) 10 mg tablet Take 1 tablet (10 mg total) by mouth daily at bedtime     No current facility-administered medications on file prior to visit  She has No Known Allergies       Review of Systems   Constitutional: Negative for activity change, appetite change, fatigue and unexpected weight change  Eyes: Positive for visual disturbance  Respiratory: Positive for shortness of breath  Negative for cough, chest tightness and wheezing  Cardiovascular: Positive for palpitations  Negative for chest pain and leg swelling  Breast: Patient denies tenderness, nipple discharge, masses, or erythema  Gastrointestinal: Positive for abdominal distention (Bloating) and constipation  Negative for abdominal pain, blood in stool, diarrhea, nausea and vomiting  Endocrine: Negative for cold intolerance and heat intolerance  Genitourinary: Negative for decreased urine volume, difficulty urinating, dyspareunia, dysuria, frequency, hematuria, menstrual problem, pelvic pain, urgency, vaginal bleeding, vaginal discharge and vaginal pain  Worsening stress urinary incontinence  Musculoskeletal: Positive for arthralgias (Knees and wrists)  Skin: Negative for rash  Neurological: Positive for headaches  Negative for weakness, light-headedness and numbness  Hematological: Does not bruise/bleed easily     Psychiatric/Behavioral: Positive for dysphoric mood (Celexa suboptimal treatment)  Negative for agitation, behavioral problems and sleep disturbance  The patient is nervous/anxious  Objective:    Vitals:    07/18/22 1540   BP: 120/70   BP Location: Left arm   Patient Position: Sitting   Cuff Size: Large   Weight: 92 5 kg (204 lb)   Height: 5' 9" (1 753 m)            Physical Exam  Vitals and nursing note reviewed  Exam conducted with a chaperone present  Constitutional:       General: She is not in acute distress  Appearance: She is well-developed  HENT:      Head: Normocephalic and atraumatic  Eyes:      General: No scleral icterus  Right eye: No discharge  Left eye: No discharge  Extraocular Movements: Extraocular movements intact  Conjunctiva/sclera: Conjunctivae normal    Neck:      Thyroid: No thyromegaly  Trachea: No tracheal deviation  Cardiovascular:      Rate and Rhythm: Normal rate and regular rhythm  Heart sounds: Normal heart sounds  No murmur heard  Pulmonary:      Effort: Pulmonary effort is normal  No respiratory distress  Breath sounds: Normal breath sounds  No wheezing  Chest:   Breasts: Breasts are symmetrical       Right: No inverted nipple, mass, nipple discharge, skin change or tenderness  Left: No inverted nipple, mass, nipple discharge, skin change or tenderness  Abdominal:      General: Bowel sounds are normal  There is no distension  Palpations: Abdomen is soft  There is no mass  Tenderness: There is no abdominal tenderness  There is no guarding or rebound  Genitourinary:     General: Normal vulva  Exam position: Lithotomy position  Labia:         Right: No rash, tenderness or lesion  Left: No rash, tenderness or lesion  Urethra: No urethral lesion  Vagina: Normal       Cervix: No cervical motion tenderness or discharge  Uterus: Not deviated, not enlarged and not tender         Adnexa:         Right: No mass, tenderness or fullness  Left: No mass, tenderness or fullness  Rectum: No external hemorrhoid  Comments: Urethral meatus within normal limits  Perineum within normal limits  Bladder well supported  Uterus is anteverted  Musculoskeletal:         General: No tenderness  Normal range of motion  Cervical back: Normal range of motion and neck supple  Lymphadenopathy:      Cervical: No cervical adenopathy  Skin:     General: Skin is warm and dry  Neurological:      Mental Status: She is alert and oriented to person, place, and time  Psychiatric:         Mood and Affect: Mood normal          Behavior: Behavior normal          Thought Content:  Thought content normal          Judgment: Judgment normal

## 2022-08-01 DIAGNOSIS — F41.9 ANXIETY: ICD-10-CM

## 2022-08-01 RX ORDER — CITALOPRAM 20 MG/1
20 TABLET ORAL DAILY
Qty: 90 TABLET | Refills: 0 | Status: SHIPPED | OUTPATIENT
Start: 2022-08-01 | End: 2022-10-30

## 2022-08-22 DIAGNOSIS — J30.2 SEASONAL ALLERGIC RHINITIS, UNSPECIFIED TRIGGER: ICD-10-CM

## 2022-08-22 RX ORDER — MONTELUKAST SODIUM 10 MG/1
10 TABLET ORAL
Qty: 90 TABLET | Refills: 0 | Status: SHIPPED | OUTPATIENT
Start: 2022-08-22 | End: 2022-11-20

## 2022-08-26 PROCEDURE — 58300 INSERT INTRAUTERINE DEVICE: CPT | Performed by: OBSTETRICS & GYNECOLOGY

## 2022-08-26 NOTE — PROGRESS NOTES
Assessment/Plan:  Successful Mirena placement  Instructions given  Jamie hatfield current pill pack  Return to the office in 1 month       Diagnoses and all orders for this visit:    Encounter for insertion of mirena IUD  -     Iud insertions  -     POCT urine HCG    Irregular menses  -     Iud insertions  -     POCT urine HCG    Hypertension, unspecified type  -     Iud insertions              Subjective:        Patient ID: Lexii Meeks is a 29 y o  female  Kayleigh presents for the Mirena insertion  She is without complaints  She last had a period on 6/25  She is on oral contraceptives  She last had intercourse 2 weeks ago  A pregnancy test today was neg  At the last visit I gave her a referral for psychology  She called but they did not return her call to schedule an appointment  I recommend she call again  The following portions of the patient's history were reviewed and updated as appropriate: She  has a past medical history of Allergic, Anxiety, Depression, and Hypertension  Patient Active Problem List    Diagnosis Date Noted    Irregular menses 07/18/2022    Depression 07/18/2022    Encounter for annual routine gynecological examination 07/17/2022    Menorrhagia 04/28/2022    ALINE positive 04/28/2022    Other fatigue 04/28/2022    Flushing 04/28/2022    Healthcare maintenance 03/08/2022    Palpitations 03/08/2022    Hypertension 03/08/2022    Anxiety 03/08/2022    Seasonal allergic rhinitis 03/08/2022     She  has no past surgical history on file  Her family history includes Depression in her father, maternal grandmother, and mother; Hypertension in her maternal grandmother and mother; Thyroid disease in her maternal grandmother and mother  She  reports that she has never smoked  She has never used smokeless tobacco  She reports current alcohol use of about 2 0 standard drinks of alcohol per week  She reports that she does not use drugs    Current Outpatient Medications   Medication Sig Dispense Refill    citalopram (CeleXA) 20 mg tablet Take 1 tablet (20 mg total) by mouth daily 90 tablet 0    linaCLOtide (Linzess) 145 MCG CAPS Take 1 capsule (145 mcg total) by mouth in the morning 30 capsule 3    lisinopril (ZESTRIL) 5 mg tablet Take 1 tablet (5 mg total) by mouth daily 90 tablet 0    montelukast (SINGULAIR) 10 mg tablet Take 1 tablet (10 mg total) by mouth daily at bedtime 90 tablet 0    norethindrone (Isadora) 0 35 MG tablet Take 1 tablet (0 35 mg total) by mouth daily 90 tablet 0    polyethylene glycol (GOLYTELY) 4000 mL solution PLEASE FOLLOW INSTRUCTIONS AS PROVIDED BY THE OFFICE 4000 mL 0     No current facility-administered medications for this visit  Current Outpatient Medications on File Prior to Visit   Medication Sig    citalopram (CeleXA) 20 mg tablet Take 1 tablet (20 mg total) by mouth daily    linaCLOtide (Linzess) 145 MCG CAPS Take 1 capsule (145 mcg total) by mouth in the morning    lisinopril (ZESTRIL) 5 mg tablet Take 1 tablet (5 mg total) by mouth daily    montelukast (SINGULAIR) 10 mg tablet Take 1 tablet (10 mg total) by mouth daily at bedtime    norethindrone (Isadora) 0 35 MG tablet Take 1 tablet (0 35 mg total) by mouth daily    polyethylene glycol (GOLYTELY) 4000 mL solution PLEASE FOLLOW INSTRUCTIONS AS PROVIDED BY THE OFFICE    [DISCONTINUED] intrauterine copper (PARAGARD) IUD 1 each by Intrauterine route    [DISCONTINUED] Sod Picosulfate-Mag Ox-Cit Acd (Clenpiq) 10-3 5-12 MG-GM -GM/160ML SOLN Use as directed as per written instructions from office     No current facility-administered medications on file prior to visit  She has No Known Allergies       Review of Systems   Constitutional: Negative            Objective:    Vitals:    08/29/22 1122   BP: 110/86   BP Location: Right arm   Patient Position: Sitting   Cuff Size: Standard   Weight: 93 5 kg (206 lb 3 2 oz)            Physical Exam  Constitutional:       General: She is not in acute distress  Appearance: Normal appearance  Genitourinary:     General: Normal vulva  Comments: Moderate cervical eversion  Uterus is anteverted  Neurological:      Mental Status: She is alert  Psychiatric:         Mood and Affect: Mood normal          Behavior: Behavior normal          Thought Content: Thought content normal          Judgment: Judgment normal          Iud insertions    Date/Time: 8/26/2022 7:36 PM  Performed by: Dorinda Horn MD  Authorized by: Dorinda Horn MD   Universal Protocol:  Consent: Verbal consent obtained  Written consent obtained  Risks and benefits: risks, benefits and alternatives were discussed  Consent given by: patient  Time out: Immediately prior to procedure a "time out" was called to verify the correct patient, procedure, equipment, support staff and site/side marked as required  Patient understanding: patient states understanding of the procedure being performed  Patient consent: the patient's understanding of the procedure matches consent given  Patient identity confirmed: verbally with patient        Procedure:     Negative urine pregnancy test: yes      Cervix cleaned and prepped: yes      Tenaculum applied to cervix: yes      Uterus sounded: yes      Uterus sound depth (cm):  8 5    IUD inserted with no complications: yes      IUD type:  Mirena    Strings trimmed: yes    Post-procedure:     Patient tolerated procedure well: yes      Patient will follow up after next period: yes    Comments:      Easily inserted  A little more than average bleeding from the tenaculum sites  Tamponaded with procto swabs

## 2022-08-29 ENCOUNTER — OFFICE VISIT (OUTPATIENT)
Dept: GASTROENTEROLOGY | Facility: CLINIC | Age: 32
End: 2022-08-29
Payer: COMMERCIAL

## 2022-08-29 ENCOUNTER — APPOINTMENT (OUTPATIENT)
Dept: LAB | Facility: CLINIC | Age: 32
End: 2022-08-29
Payer: COMMERCIAL

## 2022-08-29 ENCOUNTER — OFFICE VISIT (OUTPATIENT)
Dept: OBGYN CLINIC | Facility: CLINIC | Age: 32
End: 2022-08-29
Payer: COMMERCIAL

## 2022-08-29 VITALS
HEIGHT: 69 IN | WEIGHT: 204 LBS | DIASTOLIC BLOOD PRESSURE: 70 MMHG | BODY MASS INDEX: 30.21 KG/M2 | TEMPERATURE: 97.4 F | HEART RATE: 80 BPM | SYSTOLIC BLOOD PRESSURE: 118 MMHG

## 2022-08-29 VITALS — WEIGHT: 206.2 LBS | SYSTOLIC BLOOD PRESSURE: 110 MMHG | BODY MASS INDEX: 30.45 KG/M2 | DIASTOLIC BLOOD PRESSURE: 86 MMHG

## 2022-08-29 DIAGNOSIS — K59.09 OTHER CONSTIPATION: ICD-10-CM

## 2022-08-29 DIAGNOSIS — Z30.430 ENCOUNTER FOR INSERTION OF MIRENA IUD: Primary | ICD-10-CM

## 2022-08-29 DIAGNOSIS — K59.09 OTHER CONSTIPATION: Primary | ICD-10-CM

## 2022-08-29 DIAGNOSIS — K56.609 SMALL BOWEL OBSTRUCTION (HCC): ICD-10-CM

## 2022-08-29 DIAGNOSIS — I10 HYPERTENSION, UNSPECIFIED TYPE: ICD-10-CM

## 2022-08-29 DIAGNOSIS — N92.6 IRREGULAR MENSES: ICD-10-CM

## 2022-08-29 LAB
IGA SERPL-MCNC: 187 MG/DL (ref 70–400)
SL AMB POCT URINE HCG: NEGATIVE

## 2022-08-29 PROCEDURE — 86364 TISS TRNSGLTMNASE EA IG CLAS: CPT

## 2022-08-29 PROCEDURE — 99244 OFF/OP CNSLTJ NEW/EST MOD 40: CPT | Performed by: PHYSICIAN ASSISTANT

## 2022-08-29 PROCEDURE — 36415 COLL VENOUS BLD VENIPUNCTURE: CPT

## 2022-08-29 PROCEDURE — 82784 ASSAY IGA/IGD/IGG/IGM EACH: CPT

## 2022-08-29 PROCEDURE — 81025 URINE PREGNANCY TEST: CPT | Performed by: OBSTETRICS & GYNECOLOGY

## 2022-08-29 RX ORDER — LINACLOTIDE 145 UG/1
145 CAPSULE, GELATIN COATED ORAL DAILY
Qty: 30 CAPSULE | Refills: 3 | Status: SHIPPED | OUTPATIENT
Start: 2022-08-29 | End: 2022-09-07

## 2022-08-29 RX ORDER — SODIUM PICOSULFATE, MAGNESIUM OXIDE, AND ANHYDROUS CITRIC ACID 10; 3.5; 12 MG/160ML; G/160ML; G/160ML
LIQUID ORAL
Qty: 320 ML | Refills: 0 | Status: SHIPPED | OUTPATIENT
Start: 2022-08-29 | End: 2022-08-29

## 2022-08-29 NOTE — PATIENT INSTRUCTIONS
Scheduled date of colonoscopy (as of today):  10/28/22  Physician performing colonoscopy:  Dr Leigh Tilley  Location of colonoscopy:  [facility]  Bowel prep reviewed with patient:  Golytely  Instructions reviewed with patient by: Mabel  Clearances:  n/a

## 2022-08-29 NOTE — PROGRESS NOTES
[facility] - Outpatient Consultation  Suman Iglesias 29 y o  female MRN: [id number]  Encounter: [id number]    Assessment and Plan    1  Constipation  The patient admits to chronic constipation where she typically has a bowel movement every 2-4 days associated with significant abdominal pain and cramping  She denies any blood in her stool  She has had previous TSH tests which have been normal   She has tried and failed Metamucil, MiraLax, and stool softeners  She states that she maintains adequate hydration  - recommend obtaining celiac serologies  - will start Linzess 145 mcg and titrate up or down as needed    2  SBO  The patient has a history of small-bowel obstruction in October of 2020 of unknown etiology  She denies any history of abdominal surgeries  She states that she did see Alicia Hopper in follow-up and they recommended colonoscopy however she did not pursue this secondary to nerves about anesthesia  She has been following with Rheumatology secondary to fatigue, joint pain, and flushing  ALINE was positive and CRP was elevated to 4 2 during workup    - discussed with the patient that one concern would be Crohn's as an etiology of her SBO, as previously recommended I agree with proceeding with colonoscopy with TI intubation with the addition of a fecal calprotectin, patient agreeable  - clenpiq bowel prep instructions reviewed and provided  - further recommendations pending above workup     Follow up after colonoscopy     ______________________________________________________________________    History of Present Illness  Suman Iglesias is a 29 y o  female here for consultation of constipation  The patient states that she sometimes has a daily bowel movement but is more common for her to have a bowel movement every 2-4 days  This is associated with a significant amount of abdominal pain and cramping  She  has had this chronically    She has tried and failed Metamucil, stool softeners, and MiraLax  In addition to constipation the patient has a history of small-bowel obstruction in October of 2020  CT scan at that time revealed a mid small-bowel obstruction with no lesions visible at the transition site  She did have a distended terminal ileum but no wall thickening was present  She states that in the hospital there were uncertain of the etiology  She did see [facility] in follow-up who recommended colonoscopy  The patient states that she did not pursue this as she is nervous about anesthesia  She denies any family history of inflammatory bowel disease to her knowledge  She has been following with Rheumatology secondary to fatigue, joint pain, and flushing  ALINE was positive during workup  Aside for the above-mentioned symptoms she denies any other GI symptoms or complaints  Review of Systems   Constitutional: Negative for activity change, appetite change, chills, fatigue, fever and unexpected weight change  Gastrointestinal: Positive for abdominal pain and constipation  Negative for abdominal distention, anal bleeding, blood in stool, diarrhea, rectal pain and vomiting  Genitourinary: Negative for dysuria, frequency and hematuria  Musculoskeletal: Negative for arthralgias, gait problem and myalgias  Neurological: Negative for headaches  Psychiatric/Behavioral: Negative for confusion  Past Medical History  Past Medical History:   Diagnosis Date    Allergic     Anxiety     Depression     Hypertension        Past Social history  No past surgical history on file    Social History     Socioeconomic History    Marital status: Single     Spouse name: Not on file    Number of children: Not on file    Years of education: Not on file    Highest education level: Not on file   Occupational History    Not on file   Tobacco Use    Smoking status: Never Smoker    Smokeless tobacco: Never Used   Vaping Use    Vaping Use: Never used   Substance and Sexual Activity  Alcohol use: Yes     Alcohol/week: 2 0 standard drinks     Types: 2 Glasses of wine per week     Comment: socially    Drug use: Never    Sexual activity: Not on file   Other Topics Concern    Not on file   Social History Narrative    Not on file     Social Determinants of Health     Financial Resource Strain: Not on file   Food Insecurity: Not on file   Transportation Needs: Not on file   Physical Activity: Not on file   Stress: Not on file   Social Connections: Not on file   Intimate Partner Violence: Not on file   Housing Stability: Not on file     Social History     Substance and Sexual Activity   Alcohol Use Yes    Alcohol/week: 2 0 standard drinks    Types: 2 Glasses of wine per week    Comment: socially     Social History     Substance and Sexual Activity   Drug Use Never     Social History     Tobacco Use   Smoking Status Never Smoker   Smokeless Tobacco Never Used       Past Family History  Family History   Problem Relation Age of Onset    Depression Mother     Thyroid disease Mother     Hypertension Mother     Depression Father     Depression Maternal Grandmother     Thyroid disease Maternal Grandmother     Hypertension Maternal Grandmother        Current Medications  Current Outpatient Medications   Medication Sig Dispense Refill    citalopram (CeleXA) 20 mg tablet Take 1 tablet (20 mg total) by mouth daily 90 tablet 0    intrauterine copper (PARAGARD) IUD 1 each by Intrauterine route      lisinopril (ZESTRIL) 5 mg tablet Take 1 tablet (5 mg total) by mouth daily 90 tablet 0    montelukast (SINGULAIR) 10 mg tablet Take 1 tablet (10 mg total) by mouth daily at bedtime 90 tablet 0    norethindrone (Isadora) 0 35 MG tablet Take 1 tablet (0 35 mg total) by mouth daily 90 tablet 0     No current facility-administered medications for this visit         Allergies  No Known Allergies      The following portions of the patient's history were reviewed and updated as appropriate: allergies, current medications, past medical history, past social history, past surgical history and problem list       Vitals  There were no vitals filed for this visit  Physical Exam  Constitutional   General appearance: Patient is seated and in no acute distress, well appearing and well nourished  Head and Face   Head and face: Normal     Eyes   Conjunctiva and lids: No erythema, swelling or discharge  Anicteric  Ears, Nose, Mouth, and Throat   Hearing: Normal     Neck: Supple, trachea midline  Pulmonary   Respiratory effort: No increased work of breathing or signs of respiratory distress  Lungs: Clear to ascultation, no wheezes, rhonchi, or rales  Cardiovascular   Heart: Regular rate and rhythm, no murmurs gallops or rubs   Examination of extremities for edema and/or varicosities: Normal     Abdomen   Abdomen: Soft, no masses, no organomegaly  RLQ tenderness  Normal bowel sounds  Musculoskeletal   Gait and station: Normal     Skin   Skin and subcutaneous tissue: Warm, dry, and intact  No visible jaundice, lesions or rashes  Psychiatric   Judgment and insight: Normal  Recent and remote memory:  Normal  Mood and affect: Normal      Results  No visits with results within 1 Day(s) from this visit     Latest known visit with results is:   Hospital Outpatient Visit on 06/27/2022   Component Date Value    LA size 06/27/2022 3 4     LVPWd 06/27/2022 1 10     MV E' Tissue Velocity Se* 06/27/2022 13     IVSd 06/27/2022 1 57     LV DIASTOLIC VOLUME (MOD* 28/49/5851 71     LEFT VENTRICLE SYSTOLIC * 11/69/2870 32     Left ventricular stroke * 06/27/2022 40 00     A4C EF 06/27/2022 53     LVIDd 06/27/2022 4 00     IVS 06/27/2022 1 1     LVIDS 06/27/2022 2 90     FS 06/27/2022 28     Ao root 06/27/2022 2 60     RVID d 06/27/2022 3 2     MV valve area p 1/2 meth* 06/27/2022 4 00     E wave deceleration time 06/27/2022 190     MV Peak E Johnathon 06/27/2022 86     MV Peak A Johnathon 06/27/2022 0 63     RUBI A4C 06/27/2022 16 8     RAA A4C 06/27/2022 11     MV stenosis pressure 1/2* 06/27/2022 55     LVSV, 2D 06/27/2022 40     LV EF 06/27/2022 60        Radiology Results  No results found  Orders  No orders of the defined types were placed in this encounter  Answers for HPI/ROS submitted by the patient on 8/28/2022  Chronicity: recurrent  Onset: more than 1 year ago  Onset quality: gradual  Frequency: intermittently  Episode duration: 2 hours  Progression since onset: unchanged  Pain location: epigastric region, periumbilical region  Pain - numeric: 3/10  Pain quality: aching, cramping, a sensation of fullness  Radiates to: back, pelvis  anorexia: Yes  belching: No  flatus: No  hematochezia: No  melena:  No  weight loss: No  Aggravated by: nothing  Relieved by: bowel movements, certain positions, standing

## 2022-08-30 ENCOUNTER — OFFICE VISIT (OUTPATIENT)
Dept: FAMILY MEDICINE CLINIC | Facility: CLINIC | Age: 32
End: 2022-08-30
Payer: COMMERCIAL

## 2022-08-30 VITALS
WEIGHT: 205 LBS | TEMPERATURE: 97.5 F | OXYGEN SATURATION: 98 % | HEART RATE: 86 BPM | RESPIRATION RATE: 16 BRPM | SYSTOLIC BLOOD PRESSURE: 132 MMHG | BODY MASS INDEX: 30.36 KG/M2 | DIASTOLIC BLOOD PRESSURE: 78 MMHG | HEIGHT: 69 IN

## 2022-08-30 DIAGNOSIS — G47.00 INSOMNIA, UNSPECIFIED TYPE: ICD-10-CM

## 2022-08-30 DIAGNOSIS — F41.9 ANXIETY: Primary | ICD-10-CM

## 2022-08-30 DIAGNOSIS — R00.2 PALPITATIONS: ICD-10-CM

## 2022-08-30 DIAGNOSIS — I10 HYPERTENSION, UNSPECIFIED TYPE: ICD-10-CM

## 2022-08-30 LAB — TTG IGA SER-ACNC: <2 U/ML (ref 0–3)

## 2022-08-30 PROCEDURE — 99214 OFFICE O/P EST MOD 30 MIN: CPT | Performed by: NURSE PRACTITIONER

## 2022-08-30 RX ORDER — TRAZODONE HYDROCHLORIDE 50 MG/1
50 TABLET ORAL
Qty: 30 TABLET | Refills: 0 | Status: SHIPPED | OUTPATIENT
Start: 2022-08-30 | End: 2022-10-12 | Stop reason: SDUPTHER

## 2022-08-30 NOTE — PROGRESS NOTES
Assessment/Plan:    Anxiety  - Currently on Celexa 20 mg daily and tolerating well but still having periods of increased anxiety and difficulty sleeping    - Referral placed to Psychiatry for additional management  Hypertension  - Well controlled on lisinopril daily  Continue same    - Will continue to monitor  Insomnia  - Not well controlled  - Prescription sent for Trazodone at bedtime  Advised of side effects   - Recommend follow up in 1 month  Palpitations  - Holter monitor, zio patch, and echocardiogram all unremarkable  - Patient notes HR of 208 the other day on her Apple watch  - Palpitations could be component of anxiety  She was referred to psych    - Continue to follow up with Cardiology for additional recommendations  Diagnoses and all orders for this visit:    Anxiety  -     Ambulatory Referral to Psychiatry; Future    Insomnia, unspecified type  -     traZODone (DESYREL) 50 mg tablet; Take 1 tablet (50 mg total) by mouth daily at bedtime    Hypertension, unspecified type    Palpitations        Subjective:      Patient ID: Job Blackman is a 29 y o  female  Patient presents today with complaints of difficulty sleeping for the past 4 weeks  She states that she cant shut her brain off  She is currently taking Celexa for anxiety and has been for the past few years  She is tolerating it well  She has tried many over the counter remedies to aide in her sleep but none have helped  She has trouble falling asleep as well as staying asleep  She is also complaining of palpitations and rapid heart rate for which she is seeing Cardiology  The following portions of the patient's history were reviewed and updated as appropriate: allergies, current medications, past family history, past medical history, past social history, past surgical history and problem list     Review of Systems   Constitutional: Negative for fatigue and fever  HENT: Negative for trouble swallowing      Eyes: Negative for visual disturbance  Respiratory: Negative for cough and shortness of breath  Cardiovascular: Positive for palpitations  Negative for chest pain  Gastrointestinal: Negative for abdominal pain and blood in stool  Endocrine: Negative for cold intolerance and heat intolerance  Genitourinary: Negative for difficulty urinating and dysuria  Musculoskeletal: Negative for gait problem  Skin: Negative for rash  Neurological: Negative for dizziness, syncope and headaches  Hematological: Negative for adenopathy  Psychiatric/Behavioral: Positive for sleep disturbance  Negative for behavioral problems  Objective:      /78 (BP Location: Left arm, Patient Position: Sitting, Cuff Size: Adult)   Pulse 86   Temp 97 5 °F (36 4 °C) (Tympanic)   Resp 16   Ht 5' 9" (1 753 m)   Wt 93 kg (205 lb)   SpO2 98%   BMI 30 27 kg/m²          Physical Exam  Vitals and nursing note reviewed  Constitutional:       Appearance: Normal appearance  HENT:      Head: Normocephalic and atraumatic  Right Ear: External ear normal       Left Ear: External ear normal    Eyes:      Conjunctiva/sclera: Conjunctivae normal    Cardiovascular:      Rate and Rhythm: Normal rate and regular rhythm  Heart sounds: Normal heart sounds  Pulmonary:      Effort: Pulmonary effort is normal       Breath sounds: Normal breath sounds  Musculoskeletal:         General: Normal range of motion  Cervical back: Normal range of motion  Skin:     General: Skin is warm and dry  Neurological:      Mental Status: She is alert and oriented to person, place, and time  Cranial Nerves: No cranial nerve deficit     Psychiatric:         Mood and Affect: Mood normal          Behavior: Behavior normal

## 2022-08-30 NOTE — ASSESSMENT & PLAN NOTE
- Not well controlled  - Prescription sent for Trazodone at bedtime  Advised of side effects   - Recommend follow up in 1 month

## 2022-08-30 NOTE — ASSESSMENT & PLAN NOTE
- Holter monitor, zio patch, and echocardiogram all unremarkable  - Patient notes HR of 208 the other day on her Apple watch  - Palpitations could be component of anxiety  She was referred to psych    - Continue to follow up with Cardiology for additional recommendations

## 2022-09-07 DIAGNOSIS — K59.09 OTHER CONSTIPATION: Primary | ICD-10-CM

## 2022-09-07 RX ORDER — LUBIPROSTONE 8 UG/1
8 CAPSULE, GELATIN COATED ORAL
Qty: 30 CAPSULE | Refills: 2 | Status: SHIPPED | OUTPATIENT
Start: 2022-09-07

## 2022-09-19 ENCOUNTER — TELEPHONE (OUTPATIENT)
Dept: PSYCHIATRY | Facility: CLINIC | Age: 32
End: 2022-09-19

## 2022-09-19 ENCOUNTER — TELEPHONE (OUTPATIENT)
Dept: GASTROENTEROLOGY | Facility: CLINIC | Age: 32
End: 2022-09-19

## 2022-09-19 NOTE — TELEPHONE ENCOUNTER
Patients GI provider:  Dr Rajesh Estrada    Number to return call: [phone]    Reason for call: Pt calling to r/s procedure      Scheduled procedure/appointment date if applicable: procedure 64/21

## 2022-09-19 NOTE — TELEPHONE ENCOUNTER
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

## 2022-09-26 NOTE — TELEPHONE ENCOUNTER
Was calling in attempts to offer pt therapy anywhere appt pt  Phone was picked up, I introduced myself and then phone was hung up

## 2022-09-29 NOTE — TELEPHONE ENCOUNTER
Behavorial Health Outpatient Intake Questions    Referred by: Self     Please advised interviewee that they need to answer all questions truthfully to allow for best care and any misrepresentations of information may affect their ability to be seen at this clinic   => Was this discussed? Yes     Behavorial Health Outpatient Intake History -     Presenting Problem (in patient's words):    Pt stated that she needs some guidance with emotions, and coping skills and her anger management  She j wants to talk about past traumas      Are there any developmental disabilities? ? If yes, can they speak to you on the phone? If they are too limited to speak to you on phone, refer out No    Are you taking any psychiatric medications? Yes    => If yes, who prescribes? If yes, are they injectable medications? Celexa=pcp     Does the patient have a language barrier or hearing impairment? No    Have you been treated at [facility] by a therapist or a doctor in the past? If yes, who? No    Has the patient been hospitalized for mental health? No   If yes, how long ago was last hospitalization and where was it? Do you actively use alcohol or marijuana or illegal substances? If yes, what and how much - refer out to Drug and alcohol treatment if use is excessive or daily use of illegal substances No concerns of substance abuse are reported  Do you have a community treatment team or ? No    Legal History-     Does the patient have any history of arrests, FCI/alf time, or DUIs? No  If Yes-  1) What types of charges? 2) When were they last incarcerated? 3) Are they currently on parole or probation? Minor Child-    Who has custody of the child? Is there a custody agreement? If there is a custody agreement remind parent that they must bring a copy to the first appt or they will not be seen       Intake Team, please check with provider before scheduling if flags come up such as:  - complex case  - legal history (other than DUI)  - communication barrier concerns are present  - if, in your judgment, this needs further review    ACCEPTED as a patient Yes  => Appointment Date: 10/3/2022 with Beckie Lei     Referred Elsewhere? No    Name of Louielelolavern SANABRIA#[id number]  Insurance Phone #  If ins is primary or secondary  If patient is a minor, parents information such as Name, D  O B of guarantor

## 2022-10-11 PROBLEM — Z00.00 HEALTHCARE MAINTENANCE: Status: RESOLVED | Noted: 2022-03-08 | Resolved: 2022-10-11

## 2022-10-12 ENCOUNTER — TELEPHONE (OUTPATIENT)
Dept: PSYCHIATRY | Facility: CLINIC | Age: 32
End: 2022-10-12

## 2022-10-12 DIAGNOSIS — G47.00 INSOMNIA, UNSPECIFIED TYPE: ICD-10-CM

## 2022-10-12 DIAGNOSIS — I10 HYPERTENSION, UNSPECIFIED TYPE: ICD-10-CM

## 2022-10-12 RX ORDER — TRAZODONE HYDROCHLORIDE 50 MG/1
50 TABLET ORAL
Qty: 30 TABLET | Refills: 0 | Status: SHIPPED | OUTPATIENT
Start: 2022-10-12 | End: 2022-11-11

## 2022-10-12 RX ORDER — LISINOPRIL 5 MG/1
5 TABLET ORAL DAILY
Qty: 90 TABLET | Refills: 0 | Status: SHIPPED | OUTPATIENT
Start: 2022-10-12 | End: 2023-01-10

## 2022-10-24 ENCOUNTER — TELEPHONE (OUTPATIENT)
Dept: CARDIOLOGY CLINIC | Facility: CLINIC | Age: 32
End: 2022-10-24

## 2022-10-24 DIAGNOSIS — R00.2 PALPITATIONS: Primary | ICD-10-CM

## 2022-10-24 NOTE — TELEPHONE ENCOUNTER
Patient's co-worker Tiger-texted me with [facility] rhythm strips during an episode of palpitations  It appears to be SVT  The rate is fast (>200bpm), BP was measured by coworkers as 172/90, patient felt diaphoretic, nauseous and lightheaded without syncope  Will start metoprolol 25 mg BID  Please call patient for follow-up appointment to review all strips and decide on next step for management

## 2022-10-25 ENCOUNTER — DOCUMENTATION (OUTPATIENT)
Dept: BEHAVIORAL/MENTAL HEALTH CLINIC | Facility: CLINIC | Age: 32
End: 2022-10-25

## 2022-10-25 NOTE — TELEPHONE ENCOUNTER
I called Kayleigh to follow-up  She did not  the medication yet, but she will  She will call for an appt after that

## 2022-10-25 NOTE — PROGRESS NOTES
Kayleigh england showed her intake counseling appointment today  A no show letter will be mailed to patient's home

## 2022-11-04 ENCOUNTER — OFFICE VISIT (OUTPATIENT)
Dept: HEMATOLOGY ONCOLOGY | Facility: CLINIC | Age: 32
End: 2022-11-04

## 2022-11-04 VITALS
HEIGHT: 69 IN | TEMPERATURE: 98 F | WEIGHT: 220.6 LBS | BODY MASS INDEX: 32.67 KG/M2 | DIASTOLIC BLOOD PRESSURE: 80 MMHG | OXYGEN SATURATION: 97 % | SYSTOLIC BLOOD PRESSURE: 130 MMHG | HEART RATE: 94 BPM

## 2022-11-04 DIAGNOSIS — I15.9 SECONDARY HYPERTENSION: ICD-10-CM

## 2022-11-04 DIAGNOSIS — R61 NIGHT SWEATS: ICD-10-CM

## 2022-11-04 DIAGNOSIS — R63.5 WEIGHT GAIN: ICD-10-CM

## 2022-11-04 DIAGNOSIS — R68.89 HEAT INTOLERANCE: ICD-10-CM

## 2022-11-04 DIAGNOSIS — G44.52 NEW DAILY PERSISTENT HEADACHE: Primary | ICD-10-CM

## 2022-11-04 DIAGNOSIS — D53.9 NUTRITIONAL ANEMIA: ICD-10-CM

## 2022-11-04 DIAGNOSIS — R23.2 FLUSHING: ICD-10-CM

## 2022-11-04 RX ORDER — PSEUDOEPHEDRINE HCL 120 MG
400 TABLET, EXTENDED RELEASE ORAL DAILY
COMMUNITY

## 2022-11-04 RX ORDER — LANOLIN ALCOHOL/MO/W.PET/CERES
400 CREAM (GRAM) TOPICAL DAILY
COMMUNITY

## 2022-11-04 RX ORDER — ASCORBIC ACID 500 MG
500 TABLET ORAL DAILY
COMMUNITY
Start: 2022-09-15

## 2022-11-06 NOTE — PROGRESS NOTES
HEMATOLOGY / [address] FOLLOW UP NOTE    Primary Care Provider: Ben Perry  Referring Provider:    MRN: [id number]  : 1990    Reason for Encounter: follow up flushing, HTN, and NS         Interval History: patient was evaluated in our office for the above complaints  This workup included normal iron studies, a normal tryptase level, normal chromogranin A, normal urine metanephrines, and a normal AM cortisol level  She continues to have flushing with no obvious triggering cause  This has been going on for 10 years after she had her first son  She doesn't have diarrhea and tends to be more constipated  For the past 5 years she has also had excessive sweating, which can be severe at night to the point where she needs to change her clothes  She has also had elevated HR and BP, which is being treated by cardiology  She is currently on metoprolol with partial control of her BP and HR  She has gained 25 lbs over the past 2 mos  She is also getting daily headaches that are only partially relieved with OTC pain meds  She has very light spotting with her period and currently has in a mirena IUD  REVIEW OF SYSTEMS:  Please note that a 14-point review of systems was performed to include Constitutional, HEENT, Respiratory, CVS, GI, , Musculoskeletal, Integumentary, Neurologic, Rheumatologic, Endocrinologic, Psychiatric, Lymphatic, and Hematologic/Oncologic systems were reviewed and are negative unless otherwise stated in HPI  Positive and negative findings pertinent to this evaluation are incorporated into the history of present illness        ECOG PS: 0    PROBLEM LIST:  Patient Active Problem List   Diagnosis   • Palpitations   • Hypertension   • Anxiety   • Seasonal allergic rhinitis   • Menorrhagia   • ALINE positive   • Other fatigue   • Flushing   • Encounter for annual routine gynecological examination   • Irregular menses   • Depression   • Insomnia       Assessment / Plan: I am going to take this workup a bit further as Lizeth Garcia is very bothered by her symptoms  I am going to repeat a cbc, cmp, TSH and LDH  I am also going to sent a calcitonin and VIP  I am also going to check her estradiol, LDH and FSH  We will also check a 24 hour urin 5-HIAA  For imaging we will check a CT scan and renal artery ultrasound  I also ordered an MRI of the brain  I will call her also these results come back to review results and determine if there is a more serious cause of her symptoms  I cautioned that we might not find anything because this has been going on for so long and nothing more has happened to show us that this is something serious, but the symptoms are distressing enough that she would like to be aggressive about the workup  I spent 30 minutes on chart review, face to face counseling time, coordination of care and documentation  Past Medical History:   has a past medical history of Allergic, Anxiety, Depression, and Hypertension  PAST SURGICAL HISTORY:   has no past surgical history on file      CURRENT MEDICATIONS  Current Outpatient Medications   Medication Sig Dispense Refill   • ascorbic acid (VITAMIN C) 500 mg tablet Take 500 mg by mouth in the morning     • Calcium Carb-Cholecalciferol (Oyster Shell Calcium) 500-400 MG-UNIT TABS Take 500 mg by mouth in the morning     • citalopram (CeleXA) 20 mg tablet Take 1 tablet (20 mg total) by mouth daily 90 tablet 0   • folic acid (FOLVITE) 889 mcg tablet Take 400 mcg by mouth in the morning     • lisinopril (ZESTRIL) 5 mg tablet Take 1 tablet (5 mg total) by mouth daily 90 tablet 0   • Magnesium ([employer] Triple Magnesium Complex) 400 MG CAPS Take 400 mg by mouth in the morning     • metoprolol tartrate (LOPRESSOR) 25 mg tablet Take 1 tablet (25 mg total) by mouth every 12 (twelve) hours 60 tablet 1   • montelukast (SINGULAIR) 10 mg tablet Take 1 tablet (10 mg total) by mouth daily at bedtime 90 tablet 0   • traZODone (DESYREL) 50 mg tablet Take 1 tablet (50 mg total) by mouth daily at bedtime 30 tablet 0   • lubiprostone (AMITIZA) 8 mcg capsule Take 1 capsule (8 mcg total) by mouth daily with breakfast (Patient not taking: Reported on 11/4/2022) 30 capsule 2   • polyethylene glycol (GOLYTELY) 4000 mL solution PLEASE FOLLOW INSTRUCTIONS AS PROVIDED BY THE OFFICE (Patient not taking: Reported on 11/4/2022) 4000 mL 0     No current facility-administered medications for this visit  [unfilled]    SOCIAL HISTORY:   reports that she has never smoked  She has never used smokeless tobacco  She reports current alcohol use of about 2 0 standard drinks of alcohol per week  She reports that she does not use drugs  FAMILY HISTORY:  family history includes Depression in her father, maternal grandmother, and mother; Hypertension in her maternal grandmother and mother; Thyroid disease in her maternal grandmother and mother  ALLERGIES:  has No Known Allergies  Physical Exam:  Vital Signs:   Visit Vitals  /80 (BP Location: Left arm, Patient Position: Sitting, Cuff Size: Standard)   Pulse 94   Temp 98 °F (36 7 °C) (Tympanic)   Ht 5' 9" (1 753 m)   Wt 100 kg (220 lb 9 6 oz)   LMP  (LMP Unknown)   SpO2 97%   Breastfeeding No   BMI 32 58 kg/m²   OB Status Birth Control   Smoking Status Never Smoker   BSA 2 15 m²     Body mass index is 32 58 kg/m²  Body surface area is 2 15 meters squared  GEN: Alert, awake oriented x3, in no acute distress  HEENT- No pallor, icterus, cyanosis, no oral mucosal lesions,   LAD - no palpable cervical, clavicle, axillary, inguinal LAD  Heart- normal S1 S2, regular rate and rhythm, No murmur, rubs     Lungs- clear breathing sound bilateral    Abdomen- soft, Non tender, bowel sounds present  Extremities- No cyanosis, clubbing, edema  Neuro- No focal neurological deficit    Labs:  Lab Results   Component Value Date    WBC 8 42 07/15/2017    HGB 13 7 07/15/2017    HCT 41 9 07/15/2017    MCV 88 07/15/2017     07/15/2017     Lab Results   Component Value Date    SODIUM 138 07/15/2017    K 4 0 07/15/2017     07/15/2017    CO2 23 07/15/2017    AGAP 8 07/15/2017    BUN 11 07/15/2017    CREATININE 0 67 07/15/2017    GLUF 71 07/15/2017    CALCIUM 8 9 07/15/2017    AST 48 (H) 07/15/2017    ALT 77 07/15/2017    ALKPHOS 75 07/15/2017    TP 7 4 07/15/2017    TBILI 0 42 07/15/2017    EGFR >60 0 07/15/2017

## 2022-11-08 ENCOUNTER — APPOINTMENT (OUTPATIENT)
Dept: LAB | Facility: CLINIC | Age: 32
End: 2022-11-08

## 2022-11-08 DIAGNOSIS — R68.89 HEAT INTOLERANCE: ICD-10-CM

## 2022-11-08 DIAGNOSIS — D53.9 NUTRITIONAL ANEMIA: ICD-10-CM

## 2022-11-08 DIAGNOSIS — R23.2 FLUSHING: ICD-10-CM

## 2022-11-08 DIAGNOSIS — R61 NIGHT SWEATS: ICD-10-CM

## 2022-11-08 DIAGNOSIS — R63.5 WEIGHT GAIN: ICD-10-CM

## 2022-11-08 LAB
ALBUMIN SERPL BCP-MCNC: 3.4 G/DL (ref 3.5–5)
ALP SERPL-CCNC: 64 U/L (ref 46–116)
ALT SERPL W P-5'-P-CCNC: 106 U/L (ref 12–78)
ANION GAP SERPL CALCULATED.3IONS-SCNC: 5 MMOL/L (ref 4–13)
AST SERPL W P-5'-P-CCNC: 51 U/L (ref 5–45)
BASOPHILS # BLD AUTO: 0.03 THOUSANDS/ÂΜL (ref 0–0.1)
BASOPHILS NFR BLD AUTO: 0 % (ref 0–1)
BILIRUB SERPL-MCNC: 0.44 MG/DL (ref 0.2–1)
BUN SERPL-MCNC: 15 MG/DL (ref 5–25)
CALCIUM ALBUM COR SERPL-MCNC: 9.3 MG/DL (ref 8.3–10.1)
CALCIUM SERPL-MCNC: 8.8 MG/DL (ref 8.3–10.1)
CHLORIDE SERPL-SCNC: 109 MMOL/L (ref 96–108)
CO2 SERPL-SCNC: 24 MMOL/L (ref 21–32)
CREAT SERPL-MCNC: 0.71 MG/DL (ref 0.6–1.3)
CRP SERPL QL: 8.7 MG/L
EOSINOPHIL # BLD AUTO: 0.08 THOUSAND/ÂΜL (ref 0–0.61)
EOSINOPHIL NFR BLD AUTO: 1 % (ref 0–6)
ERYTHROCYTE [DISTWIDTH] IN BLOOD BY AUTOMATED COUNT: 12.6 % (ref 11.6–15.1)
ERYTHROCYTE [SEDIMENTATION RATE] IN BLOOD: 14 MM/HOUR (ref 0–19)
ESTRADIOL SERPL-MCNC: 39 PG/ML
FSH SERPL-ACNC: 4.7 MIU/ML
GFR SERPL CREATININE-BSD FRML MDRD: 114 ML/MIN/1.73SQ M
GLUCOSE P FAST SERPL-MCNC: 107 MG/DL (ref 65–99)
HCT VFR BLD AUTO: 44.6 % (ref 34.8–46.1)
HGB BLD-MCNC: 14.5 G/DL (ref 11.5–15.4)
IMM GRANULOCYTES # BLD AUTO: 0.04 THOUSAND/UL (ref 0–0.2)
IMM GRANULOCYTES NFR BLD AUTO: 1 % (ref 0–2)
LDH SERPL-CCNC: 200 U/L (ref 81–234)
LH SERPL-ACNC: 9.4 MIU/ML
LYMPHOCYTES # BLD AUTO: 2.46 THOUSANDS/ÂΜL (ref 0.6–4.47)
LYMPHOCYTES NFR BLD AUTO: 29 % (ref 14–44)
MCH RBC QN AUTO: 29.3 PG (ref 26.8–34.3)
MCHC RBC AUTO-ENTMCNC: 32.5 G/DL (ref 31.4–37.4)
MCV RBC AUTO: 90 FL (ref 82–98)
MONOCYTES # BLD AUTO: 0.39 THOUSAND/ÂΜL (ref 0.17–1.22)
MONOCYTES NFR BLD AUTO: 5 % (ref 4–12)
NEUTROPHILS # BLD AUTO: 5.6 THOUSANDS/ÂΜL (ref 1.85–7.62)
NEUTS SEG NFR BLD AUTO: 64 % (ref 43–75)
NRBC BLD AUTO-RTO: 0 /100 WBCS
PLATELET # BLD AUTO: 258 THOUSANDS/UL (ref 149–390)
PMV BLD AUTO: 10.2 FL (ref 8.9–12.7)
POTASSIUM SERPL-SCNC: 4.4 MMOL/L (ref 3.5–5.3)
PROT SERPL-MCNC: 7.8 G/DL (ref 6.4–8.4)
RBC # BLD AUTO: 4.95 MILLION/UL (ref 3.81–5.12)
SODIUM SERPL-SCNC: 138 MMOL/L (ref 135–147)
TSH SERPL DL<=0.05 MIU/L-ACNC: 1.77 UIU/ML (ref 0.45–4.5)
VIT B12 SERPL-MCNC: 394 PG/ML (ref 100–900)
WBC # BLD AUTO: 8.6 THOUSAND/UL (ref 4.31–10.16)

## 2022-11-09 ENCOUNTER — OFFICE VISIT (OUTPATIENT)
Dept: CARDIOLOGY CLINIC | Facility: CLINIC | Age: 32
End: 2022-11-09

## 2022-11-09 VITALS
BODY MASS INDEX: 33.33 KG/M2 | OXYGEN SATURATION: 98 % | HEART RATE: 88 BPM | DIASTOLIC BLOOD PRESSURE: 82 MMHG | SYSTOLIC BLOOD PRESSURE: 120 MMHG | WEIGHT: 225 LBS | HEIGHT: 69 IN

## 2022-11-09 DIAGNOSIS — I47.1 SVT (SUPRAVENTRICULAR TACHYCARDIA) (HCC): Primary | ICD-10-CM

## 2022-11-09 DIAGNOSIS — I10 HYPERTENSION, UNSPECIFIED TYPE: ICD-10-CM

## 2022-11-09 PROBLEM — I47.10 SVT (SUPRAVENTRICULAR TACHYCARDIA): Status: ACTIVE | Noted: 2022-11-09

## 2022-11-09 NOTE — PROGRESS NOTES
Cardiology Outpatient Follow-Up Note - Shira Benítez 29 y o  female MRN: [id number]      Assessment/Plan:  1  SVT (supraventricular tachycardia) (HCC)  Continue metoprolol 25 mg b i d   Continue Valsalva maneuvers  Likely AVNRT  We will refer to EP to consider ablation, patient is interested in proceeding with ablation   - Ambulatory referral to Cardiac Electrophysiology; Future    2  Hypertension, unspecified type  Well controlled on current regimen  Continue lisinopril 5 mg daily  Goal blood pressure under 130/80  We will see Shira Benítez back in 6 months for routine follow-up  Subjective:     HPI: Shira Benítez is a 29y o  year old female with SVT, likely AVNRT, and hypertension who presents today for follow-up  The patient presented to me on 05/26/2022 with complaints of palpitations and high heart rates on her Apple watch  She had reported high heart rates as high as 220 bpm   We attempted to capture episodes with a [employer] patch monitor, which fell off after 4 days  Unfortunately, no episodes were captured  Echocardiogram on 06/27/2022 was unremarkable  I recommended the patient get a [employer] mobile device  She began using cardio mobile and captured an episode of what appeared to be SVT/AVNRT currently uploaded in the media folder 10/24/2022  She has been able to terminate episodes with Valsalva maneuver  We began metoprolol 25 mg b i d  to attempt suppression and it has been modestly successful, although she is still having symptomatic recurrences  Discussed ablation today and she is interested in proceeding  Will refer to EP          ROS:  Review of Systems:  Review of Systems   Constitutional: Negative for activity change and fatigue  HENT: Negative for facial swelling  Eyes: Negative for visual disturbance  Respiratory: Negative for chest tightness and shortness of breath  Cardiovascular: Negative for chest pain, palpitations and leg swelling     Gastrointestinal: Negative for nausea and vomiting  Musculoskeletal: Negative for myalgias  Skin: Negative for pallor  Allergic/Immunologic: Negative for immunocompromised state  Neurological: Negative for dizziness, syncope and light-headedness  Psychiatric/Behavioral: Negative for agitation and confusion  The patient is nervous/anxious  Objective:     Vitals:   Vitals:    11/09/22 1335   BP: 106/66   BP Location: Left arm   Cuff Size: Large   Pulse: 88   SpO2: 98%   Weight: 102 kg (225 lb)   Height: 5' 9" (1 753 m)    Body surface area is 2 17 meters squared  Wt Readings from Last 3 Encounters:   11/09/22 102 kg (225 lb)   11/04/22 100 kg (220 lb 9 6 oz)   08/30/22 93 kg (205 lb)       Physical Exam:  General: Ashlyn Moreno is a well appearing female, in no acute distress, sitting comfortably  HEENT: moist mucous membranes, EOMI  Neck:  No JVD, supple, trachea midline  Cardiovascular: unremarkable S1/S2, regular rate and rhythm, no murmurs, rubs or gallops  Pulmonary: normal respiratory effort, CTAB  Abdomen: soft and nondistended  Extremities: No lower extremity edema  Warm and well perfused extremities  Neuro: no focal motor deficits, AAOx3 (person, place, time)  Psych: anxious, cooperative      Medications (at the START of this encounter):   Outpatient Medications Prior to Visit   Medication Sig Dispense Refill   • ascorbic acid (VITAMIN C) 500 mg tablet Take 500 mg by mouth in the morning     • Calcium Carb-Cholecalciferol (Oyster Shell Calcium) 500-400 MG-UNIT TABS Take 500 mg by mouth in the morning     • citalopram (CeleXA) 20 mg tablet Take 1 tablet (20 mg total) by mouth daily 90 tablet 0   • folic acid (FOLVITE) 749 mcg tablet Take 400 mcg by mouth in the morning     • lisinopril (ZESTRIL) 5 mg tablet Take 1 tablet (5 mg total) by mouth daily 90 tablet 0   • Magnesium ([employer]S Triple Magnesium Complex) 400 MG CAPS Take 400 mg by mouth in the morning     • metoprolol tartrate (LOPRESSOR) 25 mg tablet Take 1 tablet (25 mg total) by mouth every 12 (twelve) hours 60 tablet 1   • montelukast (SINGULAIR) 10 mg tablet Take 1 tablet (10 mg total) by mouth daily at bedtime 90 tablet 0   • traZODone (DESYREL) 50 mg tablet Take 1 tablet (50 mg total) by mouth daily at bedtime 30 tablet 0   • lubiprostone (AMITIZA) 8 mcg capsule Take 1 capsule (8 mcg total) by mouth daily with breakfast (Patient not taking: No sig reported) 30 capsule 2   • polyethylene glycol (GOLYTELY) 4000 mL solution PLEASE FOLLOW INSTRUCTIONS AS PROVIDED BY THE OFFICE (Patient not taking: No sig reported) 4000 mL 0     No facility-administered medications prior to visit  Labs & Results:        EKG personally reviewed:  n/a        Time Spent:  Total time (face-to-face and non-face-to-face) spent on today's visit was 20 minutes  This includes preparation for the visits (i e  reviewing test results), performance of a medically appropriate history and examination, and orders for medications, tests or other procedures  This time is exclusive of procedures performed and time spent teaching  This note was completed in part utilizing [employer] direct voice recognition software  Grammatical errors, random word insertion, spelling mistakes, occasional wrong word or "sound-alike" substitutions and incomplete sentences may be an occasional consequence of the system secondary to software limitations, ambient noise and hardware issues  At the time of dictation, efforts were made to edit, clarify and /or correct errors  Please read the chart carefully and recognize, using context, where substitutions have occurred  If you have any questions or concerns about the context, text or information contained within the body of this dictation, please contact myself, the provider, for further clarification

## 2022-11-10 LAB — CALCIT SERPL-MCNC: <2 PG/ML (ref 0–5)

## 2022-11-11 ENCOUNTER — HOSPITAL ENCOUNTER (OUTPATIENT)
Dept: RADIOLOGY | Facility: HOSPITAL | Age: 32
Discharge: HOME | End: 2022-11-11
Attending: INTERNAL MEDICINE

## 2022-11-11 DIAGNOSIS — R61 NIGHT SWEATS: ICD-10-CM

## 2022-11-11 DIAGNOSIS — R61 NIGHT SWEATS: Primary | ICD-10-CM

## 2022-11-13 DIAGNOSIS — F41.9 ANXIETY: ICD-10-CM

## 2022-11-13 RX ORDER — CITALOPRAM 20 MG/1
20 TABLET ORAL DAILY
Qty: 90 TABLET | Refills: 0 | Status: CANCELLED | OUTPATIENT
Start: 2022-11-13 | End: 2023-02-11

## 2022-11-15 DIAGNOSIS — F41.9 ANXIETY: ICD-10-CM

## 2022-11-15 RX ORDER — CITALOPRAM 20 MG/1
20 TABLET ORAL DAILY
Qty: 90 TABLET | Refills: 0 | Status: SHIPPED | OUTPATIENT
Start: 2022-11-15 | End: 2023-02-13

## 2022-11-15 NOTE — PROGRESS NOTES
EPS Consultation/New Patient Evaluation - Roselia Figueroa 29 y o  female MRN: [id number]           ASSESSMENT:  1  SVT (supraventricular tachycardia) ([address] )  Ambulatory referral to Cardiac Electrophysiology    POCT ECG      2  Palpitations        3  Hypertension, unspecified type                  PLAN:  1  SVT highly symptomatic  Breaking through beta blocker  D/W Kayleigh in detail options of ablation  I showed her images of how ablation is performed  Estimated 95% success rate for re-entrant arrhythmia and up 1% or risk of complications including serious complications including but not limited to heart perforation, MI, CVA, need for pacemaker  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  2  Palps secondary to SVT    3  HTN well controlled on lisinopril    WILL MOVE FORWARD WITH EPS/RFA OF SVT  HOLD 5 DOSES OF METOPROLOL PRIOR TO ABLATION  BABY ASA NIGHT BEFORE        CC/HPI:       Diony Purvis presents to the office referred by Dr Fransisco Becerra to discuss SVT, possible ablation  Pt did wear a [employer] Patch monitor, but said it fell off after 4 days  She did acquire a [employer] Mobile device  She was able to record en episode of increased heart rate, which is entered in the chart under the Media tab, dated 10/24/2022  She has very frequent episodes  On metoprolol 25 bid breaking through with symptoms of palpitations, lightheadedness, flushing and occasional chest pain  No syncope  She states it scares her children when it happens    It is interfering with her quality of life        ROS   12 point review of systems negative for chest pain, shortness of breath, palpitations, lightheadedness, dizziness, and syncope  Objective:     Vitals:  Blood pressure 132/72, pulse 79, height 5' 9" (1 753 m), weight 103 kg (227 lb 11 2 oz), not currently breastfeeding            Physical Exam:    GEN: Roselia Figueroa appears well, alert and oriented x 3, pleasant and cooperative   HEENT: pupils equal, round, and reactive to light; extraocular muscles intact  NECK: supple, no carotid bruits   HEART: regular rhythm, normal S1 and S2, no murmurs, clicks, gallops or rubs   LUNGS: clear to auscultation bilaterally; no wheezes, rales, or rhonchi   ABDOMEN: normal bowel sounds, soft, no tenderness, no distention  EXTREMITIES: peripheral pulses normal; no clubbing, cyanosis, or edema  NEURO: no focal findings   SKIN: normal without suspicious lesions on exposed skin    Medications:      Current Outpatient Medications:   •  ascorbic acid (VITAMIN C) 500 mg tablet, Take 500 mg by mouth in the morning, Disp: , Rfl:   •  Calcium Carb-Cholecalciferol (Oyster Shell Calcium) 500-400 MG-UNIT TABS, Take 500 mg by mouth in the morning, Disp: , Rfl:   •  citalopram (CeleXA) 20 mg tablet, Take 1 tablet (20 mg total) by mouth daily, Disp: 90 tablet, Rfl: 0  •  folic acid (FOLVITE) 423 mcg tablet, Take 400 mcg by mouth in the morning, Disp: , Rfl:   •  lisinopril (ZESTRIL) 5 mg tablet, Take 1 tablet (5 mg total) by mouth daily, Disp: 90 tablet, Rfl: 0  •  Magnesium (CVS Triple Magnesium Complex) 400 MG CAPS, Take 400 mg by mouth in the morning, Disp: , Rfl:   •  metoprolol tartrate (LOPRESSOR) 25 mg tablet, Take 1 tablet (25 mg total) by mouth every 12 (twelve) hours, Disp: 60 tablet, Rfl: 1  •  montelukast (SINGULAIR) 10 mg tablet, Take 1 tablet (10 mg total) by mouth daily at bedtime, Disp: 90 tablet, Rfl: 0  •  traZODone (DESYREL) 50 mg tablet, Take 1 tablet (50 mg total) by mouth daily at bedtime, Disp: 30 tablet, Rfl: 0  •  lubiprostone (AMITIZA) 8 mcg capsule, Take 1 capsule (8 mcg total) by mouth daily with breakfast (Patient not taking: Reported on 11/4/2022), Disp: 30 capsule, Rfl: 2  •  polyethylene glycol (GOLYTELY) 4000 mL solution, PLEASE FOLLOW INSTRUCTIONS AS PROVIDED BY THE OFFICE (Patient not taking: Reported on 11/17/2022), Disp: 4000 mL, Rfl: 0     Family History   Problem Relation Age of Onset   • Depression Mother    • Thyroid disease Mother    • Hypertension Mother    • Depression Father    • Depression Maternal Grandmother    • Thyroid disease Maternal Grandmother    • Hypertension Maternal Grandmother      Social History     Socioeconomic History   • Marital status: Single     Spouse name: Not on file   • Number of children: Not on file   • Years of education: Not on file   • Highest education level: Not on file   Occupational History   • Not on file   Tobacco Use   • Smoking status: Never   • Smokeless tobacco: Never   Vaping Use   • Vaping Use: Never used   Substance and Sexual Activity   • Alcohol use: Yes     Alcohol/week: 2 0 standard drinks     Types: 2 Glasses of wine per week     Comment: socially   • Drug use: Never   • Sexual activity: Not on file   Other Topics Concern   • Not on file   Social History Narrative   • Not on file     Social Determinants of Health     Financial Resource Strain: Not on file   Food Insecurity: Not on file   Transportation Needs: Not on file   Physical Activity: Not on file   Stress: Not on file   Social Connections: Not on file   Intimate Partner Violence: Not on file   Housing Stability: Not on file     Social History     Tobacco Use   Smoking Status Never   Smokeless Tobacco Never     Social History     Substance and Sexual Activity   Alcohol Use Yes   • Alcohol/week: 2 0 standard drinks   • Types: 2 Glasses of wine per week    Comment: socially       Labs & Results:  Below is the patient's most recent value for Albumin, ALT, AST, BUN, Calcium, Chloride, Cholesterol, CO2, Creatinine, GFR, Glucose, HDL, Hematocrit, Hemoglobin, Hemoglobin A1C, LDL, Magnesium, Phosphorus, Platelets, Potassium, PSA, Sodium, Triglycerides, and WBC     Lab Results   Component Value Date     (H) 11/08/2022    AST 51 (H) 11/08/2022    BUN 15 11/08/2022    CALCIUM 8 8 11/08/2022     (H) 11/08/2022    CO2 24 11/08/2022    CREATININE 0 71 11/08/2022    HDL 33 (L) 04/29/2022    HCT 44 6 11/08/2022    HGB 14 5 11/08/2022    HGBA1C 5 2 04/29/2022     11/08/2022    K 4 4 11/08/2022    TRIG 118 04/29/2022    WBC 8 60 11/08/2022     Note: for a comprehensive list of the patient's lab results, access the Results Review activity  Cardiac testing:   No results found for this or any previous visit  No results found for this or any previous visit  No results found for this or any previous visit  No results found for this or any previous visit

## 2022-11-16 LAB — VIP SERPL-MCNC: <16.8 PG/ML (ref 0–58.8)

## 2022-11-17 ENCOUNTER — CONSULT (OUTPATIENT)
Dept: CARDIOLOGY CLINIC | Facility: CLINIC | Age: 32
End: 2022-11-17

## 2022-11-17 VITALS
DIASTOLIC BLOOD PRESSURE: 72 MMHG | BODY MASS INDEX: 33.72 KG/M2 | HEART RATE: 79 BPM | WEIGHT: 227.7 LBS | SYSTOLIC BLOOD PRESSURE: 132 MMHG | HEIGHT: 69 IN

## 2022-11-17 DIAGNOSIS — I47.1 SVT (SUPRAVENTRICULAR TACHYCARDIA) (HCC): Primary | ICD-10-CM

## 2022-11-17 DIAGNOSIS — I10 HYPERTENSION, UNSPECIFIED TYPE: ICD-10-CM

## 2022-11-17 DIAGNOSIS — R00.2 PALPITATIONS: ICD-10-CM

## 2022-11-17 PROBLEM — Z97.5 IUD (INTRAUTERINE DEVICE) IN PLACE: Status: ACTIVE | Noted: 2022-11-17

## 2022-11-17 NOTE — PROGRESS NOTES
Assessment/Plan:  Mucopurulent cervical discharge admixed with blood  No signs of PID  IUD seems to be in the correct location  Cervical cultures pending  If negative would use Estrace 1 mg for 1 month  ParaGard was removed for abnormal bleeding in 6/22    Diagnoses and all orders for this visit:    IUD (intrauterine device) in place    Irregular menses    Hypertension, unspecified type    Screening for STD (sexually transmitted disease)              Subjective:        Patient ID: Chester Ricardo is a 29 y o  female  29 yr old F who presents for follow up after IUD insertion  States that for the past 5-6 weeks, she has been having vaginal discharge mixed with blood when she wipes after going to the bathroom  She has been having slight cramping associated with this that is occasional  She has not had to take any medication for the cramping  Denies any vaginal itching or discomfort  She is sexually active with the same partner for the last 2 years but denies being in a relationship  STD testing was declined in the past but recommended today  Denies any fevers, chills, urinary frequency, urgency  She is been having sex with this partner intermittently for 2 years  She does not believe he is having sex with anyone else  The IUD was placed on August 29th  At that time she was suffering from anxiety and depression  A recommendation to see a psychologist was made  She did attempt this but was not called back  Things have dramatically improved since then  She is made some lifestyle changes and currently does not need assistance  The following portions of the patient's history were reviewed and updated as appropriate: She  has a past medical history of Allergic, Anxiety, Depression, and Hypertension    Patient Active Problem List    Diagnosis Date Noted   • IUD (intrauterine device) in place 11/17/2022   • SVT (supraventricular tachycardia) ([facility] [address] ) 11/09/2022   • Insomnia 08/30/2022   • Irregular menses 07/18/2022   • Depression 07/18/2022   • Encounter for annual routine gynecological examination 07/17/2022   • Menorrhagia 04/28/2022   • ALINE positive 04/28/2022   • Other fatigue 04/28/2022   • Flushing 04/28/2022   • Palpitations 03/08/2022   • Hypertension 03/08/2022   • Anxiety 03/08/2022   • Seasonal allergic rhinitis 03/08/2022     She  has no past surgical history on file  Her family history includes Depression in her father, maternal grandmother, and mother; Hypertension in her maternal grandmother and mother; Thyroid disease in her maternal grandmother and mother  She  reports that she has never smoked  She has never used smokeless tobacco  She reports current alcohol use of about 2 0 standard drinks per week  She reports that she does not use drugs    Current Outpatient Medications   Medication Sig Dispense Refill   • ascorbic acid (VITAMIN C) 500 mg tablet Take 500 mg by mouth in the morning     • Calcium Carb-Cholecalciferol (Oyster Shell Calcium) 500-400 MG-UNIT TABS Take 500 mg by mouth in the morning     • citalopram (CeleXA) 20 mg tablet Take 1 tablet (20 mg total) by mouth daily 90 tablet 0   • folic acid (FOLVITE) 107 mcg tablet Take 400 mcg by mouth in the morning     • lisinopril (ZESTRIL) 5 mg tablet Take 1 tablet (5 mg total) by mouth daily 90 tablet 0   • lubiprostone (AMITIZA) 8 mcg capsule Take 1 capsule (8 mcg total) by mouth daily with breakfast (Patient not taking: Reported on 11/4/2022) 30 capsule 2   • Magnesium (CVS Triple Magnesium Complex) 400 MG CAPS Take 400 mg by mouth in the morning     • metoprolol tartrate (LOPRESSOR) 25 mg tablet Take 1 tablet (25 mg total) by mouth every 12 (twelve) hours 60 tablet 1   • montelukast (SINGULAIR) 10 mg tablet Take 1 tablet (10 mg total) by mouth daily at bedtime 90 tablet 0   • polyethylene glycol (GOLYTELY) 4000 mL solution PLEASE FOLLOW INSTRUCTIONS AS PROVIDED BY THE OFFICE (Patient not taking: Reported on 11/17/2022) 4000 mL 0   • traZODone (DESYREL) 50 mg tablet Take 1 tablet (50 mg total) by mouth daily at bedtime 30 tablet 0     No current facility-administered medications for this visit  Current Outpatient Medications on File Prior to Visit   Medication Sig   • ascorbic acid (VITAMIN C) 500 mg tablet Take 500 mg by mouth in the morning   • Calcium Carb-Cholecalciferol (Oyster Shell Calcium) 500-400 MG-UNIT TABS Take 500 mg by mouth in the morning   • citalopram (CeleXA) 20 mg tablet Take 1 tablet (20 mg total) by mouth daily   • folic acid (FOLVITE) 304 mcg tablet Take 400 mcg by mouth in the morning   • lisinopril (ZESTRIL) 5 mg tablet Take 1 tablet (5 mg total) by mouth daily   • lubiprostone (AMITIZA) 8 mcg capsule Take 1 capsule (8 mcg total) by mouth daily with breakfast (Patient not taking: Reported on 11/4/2022)   • Magnesium (CVS Triple Magnesium Complex) 400 MG CAPS Take 400 mg by mouth in the morning   • metoprolol tartrate (LOPRESSOR) 25 mg tablet Take 1 tablet (25 mg total) by mouth every 12 (twelve) hours   • montelukast (SINGULAIR) 10 mg tablet Take 1 tablet (10 mg total) by mouth daily at bedtime   • polyethylene glycol (GOLYTELY) 4000 mL solution PLEASE FOLLOW INSTRUCTIONS AS PROVIDED BY THE OFFICE (Patient not taking: Reported on 11/17/2022)   • traZODone (DESYREL) 50 mg tablet Take 1 tablet (50 mg total) by mouth daily at bedtime     No current facility-administered medications on file prior to visit  She has No Known Allergies       Review of Systems   Constitutional: Negative for chills and fever  Respiratory: Negative for cough and shortness of breath  Cardiovascular: Negative for chest pain and palpitations  Gastrointestinal: Negative for abdominal pain and vomiting  Genitourinary: Positive for vaginal bleeding and vaginal discharge  Negative for difficulty urinating, dyspareunia, dysuria, hematuria, menstrual problem and vaginal pain  Skin: Negative for color change and rash     All other systems reviewed and are negative  Objective:    Vitals:    11/18/22 0945   BP: 130/86   BP Location: Right arm   Patient Position: Sitting   Cuff Size: Large   Weight: 102 kg (224 lb 9 6 oz)            Physical Exam  Constitutional:       Appearance: She is obese  Eyes:      General: No scleral icterus  Right eye: No discharge  Left eye: No discharge  Extraocular Movements: Extraocular movements intact  Cardiovascular:      Rate and Rhythm: Normal rate and regular rhythm  Heart sounds: Normal heart sounds  Pulmonary:      Effort: Pulmonary effort is normal       Breath sounds: Normal breath sounds  Genitourinary:     General: Normal vulva  Comments: Collapsing vaginal walls  A larger speculum was required  There is no vaginal discharge  Mucopurulent discharge is present at the cervix  After swabbing some blood was noted coming from the cervix  Cervical cultures were performed  A Q-tip was used to probe the cervix  The   IUD string is the proper length  The cervix actually went to and through the internal os  There was no suggestion that the IUD was being displaced  There was no cervical motion tenderness  The uterus is anteverted, mobile and nontender  There is no adnexal tenderness or masses  Skin:     General: Skin is warm and dry  Neurological:      General: No focal deficit present  Mental Status: She is alert and oriented to person, place, and time  Psychiatric:         Mood and Affect: Mood normal          Behavior: Behavior normal          Thought Content:  Thought content normal          Judgment: Judgment normal

## 2022-11-18 ENCOUNTER — OFFICE VISIT (OUTPATIENT)
Dept: OBGYN CLINIC | Facility: CLINIC | Age: 32
End: 2022-11-18

## 2022-11-18 VITALS — DIASTOLIC BLOOD PRESSURE: 86 MMHG | WEIGHT: 224.6 LBS | BODY MASS INDEX: 33.17 KG/M2 | SYSTOLIC BLOOD PRESSURE: 130 MMHG

## 2022-11-18 DIAGNOSIS — N92.6 IRREGULAR MENSES: ICD-10-CM

## 2022-11-18 DIAGNOSIS — Z11.3 SCREENING FOR STD (SEXUALLY TRANSMITTED DISEASE): ICD-10-CM

## 2022-11-18 DIAGNOSIS — Z97.5 IUD (INTRAUTERINE DEVICE) IN PLACE: ICD-10-CM

## 2022-11-18 DIAGNOSIS — I10 HYPERTENSION, UNSPECIFIED TYPE: ICD-10-CM

## 2022-11-18 DIAGNOSIS — N89.8 BLOODY VAGINAL DISCHARGE: Primary | ICD-10-CM

## 2022-11-19 LAB
C TRACH DNA SPEC QL NAA+PROBE: NEGATIVE
N GONORRHOEA DNA SPEC QL NAA+PROBE: NEGATIVE

## 2022-11-21 ENCOUNTER — HOSPITAL ENCOUNTER (OUTPATIENT)
Dept: RADIOLOGY | Facility: HOSPITAL | Age: 32
Discharge: HOME | End: 2022-11-21
Attending: INTERNAL MEDICINE

## 2022-11-21 DIAGNOSIS — R61 NIGHT SWEATS: ICD-10-CM

## 2022-11-21 DIAGNOSIS — Z97.5 IUD (INTRAUTERINE DEVICE) IN PLACE: ICD-10-CM

## 2022-11-21 DIAGNOSIS — N92.6 IRREGULAR MENSES: Primary | ICD-10-CM

## 2022-11-21 RX ORDER — ESTRADIOL 1 MG/1
1 TABLET ORAL DAILY
Qty: 30 TABLET | Refills: 0 | Status: ON HOLD | OUTPATIENT
Start: 2022-11-21 | End: 2023-01-30

## 2022-11-22 ENCOUNTER — TELEPHONE (OUTPATIENT)
Dept: CARDIOLOGY CLINIC | Facility: CLINIC | Age: 32
End: 2022-11-22

## 2022-11-22 ENCOUNTER — HOSPITAL ENCOUNTER (OUTPATIENT)
Dept: RADIOLOGY | Facility: HOSPITAL | Age: 32
Discharge: HOME | End: 2022-11-22
Attending: INTERNAL MEDICINE

## 2022-11-22 DIAGNOSIS — I47.1 SVT (SUPRAVENTRICULAR TACHYCARDIA) (HCC): Primary | ICD-10-CM

## 2022-11-22 DIAGNOSIS — R00.2 PALPITATIONS: ICD-10-CM

## 2022-11-22 DIAGNOSIS — G44.52 NEW DAILY PERSISTENT HEADACHE: ICD-10-CM

## 2022-11-22 NOTE — TELEPHONE ENCOUNTER
Patient scheduled for SVT ablation at [facility] on 12/27/22 with Dr Loving    Patient aware of general instructions, lab  test required    Metoprolol hold 5 day's prior  ASA to take a dose the night prior      Can we please check insurance for approval    Can we please have the case

## 2022-11-22 NOTE — TELEPHONE ENCOUNTER
----- Message from Edilma Dorantes DO sent at 11/17/2022  5:40 PM EST -----  Please arrange SVT ablation [place]    Phone is [phone]    Hold 5 doses of metoprolol prior    Take baby asa night before

## 2022-11-23 ENCOUNTER — HOSPITAL ENCOUNTER (OUTPATIENT)
Dept: RADIOLOGY | Facility: HOSPITAL | Age: 32
Discharge: HOME | End: 2022-11-23
Attending: INTERNAL MEDICINE

## 2022-11-23 ENCOUNTER — PREP FOR PROCEDURE (OUTPATIENT)
Dept: CARDIOLOGY CLINIC | Facility: CLINIC | Age: 32
End: 2022-11-23

## 2022-11-23 DIAGNOSIS — I47.1 SVT (SUPRAVENTRICULAR TACHYCARDIA) (HCC): Primary | ICD-10-CM

## 2022-11-23 RX ADMIN — GADOBUTROL 10 ML: 604.72 INJECTION INTRAVENOUS at 12:08

## 2022-11-25 DIAGNOSIS — K76.0 FATTY LIVER: Primary | ICD-10-CM

## 2022-11-25 DIAGNOSIS — R61 NIGHT SWEATS: Primary | ICD-10-CM

## 2022-11-25 NOTE — PROGRESS NOTES
I spoke with the patient about her labs and US of the abdomen  The US shows a significant fatty liver and she does have a transaminitis  I am going to refer her to hepatology  The only abnormality with labs so far was an increased CRP  This may be related to her liver issues

## 2022-11-28 ENCOUNTER — HOSPITAL ENCOUNTER (OUTPATIENT)
Dept: NON INVASIVE DIAGNOSTICS | Facility: CLINIC | Age: 32
Discharge: HOME | End: 2022-11-28

## 2022-11-28 DIAGNOSIS — I15.9 SECONDARY HYPERTENSION: ICD-10-CM

## 2022-12-01 ENCOUNTER — TELEPHONE (OUTPATIENT)
Dept: HEMATOLOGY ONCOLOGY | Facility: CLINIC | Age: 32
End: 2022-12-01

## 2022-12-01 NOTE — TELEPHONE ENCOUNTER
Good afternoon  The pt is scheduled for a CT chest, abdomen and pelvis on Monday, 12/5  The authorization request is currently still pending review  If this is not approved by tomorrow afternoon, we will reach back out asking for the pt to be rescheduled  We will keep you updated  Thank you

## 2022-12-03 DIAGNOSIS — J30.2 SEASONAL ALLERGIC RHINITIS, UNSPECIFIED TRIGGER: ICD-10-CM

## 2022-12-05 ENCOUNTER — OFFICE VISIT (OUTPATIENT)
Dept: BARIATRICS | Facility: CLINIC | Age: 32
End: 2022-12-05

## 2022-12-05 ENCOUNTER — HOSPITAL ENCOUNTER (OUTPATIENT)
Dept: RADIOLOGY | Facility: HOSPITAL | Age: 32
Discharge: HOME | End: 2022-12-05
Attending: INTERNAL MEDICINE

## 2022-12-05 ENCOUNTER — APPOINTMENT (OUTPATIENT)
Dept: LAB | Facility: CLINIC | Age: 32
End: 2022-12-05

## 2022-12-05 VITALS
WEIGHT: 226.8 LBS | BODY MASS INDEX: 33.59 KG/M2 | HEIGHT: 69 IN | RESPIRATION RATE: 16 BRPM | HEART RATE: 92 BPM | DIASTOLIC BLOOD PRESSURE: 60 MMHG | SYSTOLIC BLOOD PRESSURE: 116 MMHG

## 2022-12-05 DIAGNOSIS — I10 HYPERTENSION, UNSPECIFIED TYPE: ICD-10-CM

## 2022-12-05 DIAGNOSIS — I47.1 SVT (SUPRAVENTRICULAR TACHYCARDIA) (HCC): ICD-10-CM

## 2022-12-05 DIAGNOSIS — F41.9 ANXIETY: ICD-10-CM

## 2022-12-05 DIAGNOSIS — E66.9 OBESITY, CLASS I, BMI 30-34.9: Primary | ICD-10-CM

## 2022-12-05 DIAGNOSIS — R00.2 PALPITATIONS: ICD-10-CM

## 2022-12-05 DIAGNOSIS — E66.9 OBESITY, CLASS I, BMI 30-34.9: ICD-10-CM

## 2022-12-05 DIAGNOSIS — R61 NIGHT SWEATS: ICD-10-CM

## 2022-12-05 LAB — INSULIN SERPL-ACNC: 23.1 MU/L (ref 3–25)

## 2022-12-05 RX ORDER — MONTELUKAST SODIUM 10 MG/1
10 TABLET ORAL
Qty: 90 TABLET | Refills: 0 | Status: SHIPPED | OUTPATIENT
Start: 2022-12-05 | End: 2023-03-05

## 2022-12-05 RX ADMIN — IOHEXOL 100 ML: 350 INJECTION, SOLUTION INTRAVENOUS at 14:10

## 2022-12-05 NOTE — PROGRESS NOTES
-Discussed options of [facility]-Intensive Lifestyle Intervention Program, Very Low Calorie Diet-VLCD and Conservative Program and the role of weight loss medications   -Initial weight loss goal of 5-10% weight loss for improved health  -Screening labs 11/2022 - will check fasting insulin  Her fasting glucose was elevated  -Patient is interested in pursuing Conservative Program   - attributes her weight gain mostly to prior pregnancies, feels it has been much harder to lose weight since then  Overall feels she is fairly active and eats healthy   - hx SVT - follows with cardiology and on metoprolol but going for cardiac ablation end of this month  She will be off the metoprolol soon - NO PHENTERMINE/QSYMIA  - pt is unfamiliar with her dads side history - caution GLP-1 in case of thyroid CA history would need to find this out to consider this medications  - calorie goal 3059-1026 percy/day  Start food logging  Encouraged exercise daily as well as tolerated and per her cardiologist recommendations  - follow up in 8 weeks  Moving forward can consider addition of metformin to help with sugars and to counteract other weight gaining medications? Can also consider topamax for cravings? GLP-1 if fam hx is known    Assessment/Plan:    Follow up in approximately 8 weeks   Goals:  Food log (ie ) www myfitnesspal com,sparkpeople  com,loseit com,calorieking  com,etc  baritastic  No sugary beverages  At least 64oz of water daily  Increase physical activity by 10 minutes daily  Gradually increase physical activity to a goal of 5 days per week for 30 minutes of MODERATE intensity PLUS 2 days per week of FULL BODY resistance training  5-10 servings of fruits and vegetables per day and 25-35 grams of dietary fiber per day, gradually increasing  8420-8115 percy/day    Diagnoses and all orders for this visit:    Obesity, Class I, BMI 30-34 9  -     Insulin, fasting;  Future    SVT (supraventricular tachycardia) (HCC)  -     Insulin, fasting; Future    Palpitations  -     Insulin, fasting; Future    Hypertension, unspecified type  -     Insulin, fasting; Future    Anxiety  -     Insulin, fasting; Future        Subjective:   Chief Complaint   Patient presents with   • Consult     MWM consult; waist 40in; goal wt  180; stop bang      Patient ID: Thony John  is a 29 y o  female with excess weight/obesity here to pursue weight management  Past Medical History:   Diagnosis Date   • Allergic    • Anxiety    • Depression    • Hypertension    • SVT (supraventricular tachycardia) (HCC)      HPI:  Obesity/Excess Weight:  Severity: class II  Onset:  Since first pregnancy in  - and about 20 lbs in the last 2 months    Modifiers: Diet and Exercise and Commercial Weight Loss Programs-ie  Weight Watchers, Heloise Prima, Nutrisystem, etc   Contributing factors: Pregnancy  Associated symptoms: decreased self esteem and inability to do certain activities, increased SOB   Colonoscopy-Not applicable    Goals: 307  Tobacco: no  Alcohol: no   STOPBAN/8    The following portions of the patient's history were reviewed and updated as appropriate: allergies, current medications, past family history, past medical history, past social history, past surgical history and problem list     Review of Systems   Constitutional: Negative  Respiratory: Negative  Cardiovascular: Negative  Gastrointestinal: Negative  Neurological: Negative  Psychiatric/Behavioral: Negative  Objective:    /60   Pulse 92   Resp 16   Ht 5' 9" (1 753 m)   Wt 103 kg (226 lb 12 8 oz)   BMI 33 49 kg/m²     Physical Exam  Vitals and nursing note reviewed  Constitutional:       Appearance: Normal appearance  She is obese  HENT:      Head: Normocephalic and atraumatic  Eyes:      Extraocular Movements: Extraocular movements intact  Pupils: Pupils are equal, round, and reactive to light  Cardiovascular:      Rate and Rhythm: Normal rate     Pulmonary: Effort: Pulmonary effort is normal    Musculoskeletal:         General: Normal range of motion  Cervical back: Normal range of motion  Skin:     General: Skin is warm and dry  Neurological:      General: No focal deficit present  Mental Status: She is alert and oriented to person, place, and time     Psychiatric:         Mood and Affect: Mood normal

## 2022-12-05 NOTE — PATIENT INSTRUCTIONS
Follow up in approximately 8 weeks   Goals:  Food log (ie ) www myfitnesspal com,sparkpeople  com,loseit com,calorieking  com,etc  baritastic  No sugary beverages  At least 64oz of water daily  Increase physical activity by 10 minutes daily   Gradually increase physical activity to a goal of 5 days per week for 30 minutes of MODERATE intensity PLUS 2 days per week of FULL BODY resistance training  5-10 servings of fruits and vegetables per day and 25-35 grams of dietary fiber per day, gradually increasing  6028-0618 percy/day

## 2022-12-08 ENCOUNTER — APPOINTMENT (OUTPATIENT)
Dept: LAB | Facility: CLINIC | Age: 32
End: 2022-12-08

## 2022-12-08 ENCOUNTER — OFFICE VISIT (OUTPATIENT)
Dept: GASTROENTEROLOGY | Facility: CLINIC | Age: 32
End: 2022-12-08

## 2022-12-08 VITALS
DIASTOLIC BLOOD PRESSURE: 40 MMHG | HEIGHT: 69 IN | OXYGEN SATURATION: 97 % | TEMPERATURE: 98.7 F | BODY MASS INDEX: 33.5 KG/M2 | WEIGHT: 226.2 LBS | SYSTOLIC BLOOD PRESSURE: 114 MMHG | HEART RATE: 77 BPM

## 2022-12-08 DIAGNOSIS — K76.0 FATTY LIVER: ICD-10-CM

## 2022-12-08 DIAGNOSIS — R74.01 ELEVATION OF LEVELS OF LIVER TRANSAMINASE LEVELS: ICD-10-CM

## 2022-12-08 DIAGNOSIS — R74.01 ELEVATION OF LEVELS OF LIVER TRANSAMINASE LEVELS: Primary | ICD-10-CM

## 2022-12-08 LAB
BASOPHILS # BLD AUTO: 0.04 THOUSANDS/ÂΜL (ref 0–0.1)
BASOPHILS NFR BLD AUTO: 1 % (ref 0–1)
EOSINOPHIL # BLD AUTO: 0.05 THOUSAND/ÂΜL (ref 0–0.61)
EOSINOPHIL NFR BLD AUTO: 1 % (ref 0–6)
ERYTHROCYTE [DISTWIDTH] IN BLOOD BY AUTOMATED COUNT: 12.5 % (ref 11.6–15.1)
HCT VFR BLD AUTO: 42.6 % (ref 34.8–46.1)
HGB BLD-MCNC: 14.3 G/DL (ref 11.5–15.4)
IMM GRANULOCYTES # BLD AUTO: 0.03 THOUSAND/UL (ref 0–0.2)
IMM GRANULOCYTES NFR BLD AUTO: 0 % (ref 0–2)
INR PPP: 0.98 (ref 0.84–1.19)
LYMPHOCYTES # BLD AUTO: 2.08 THOUSANDS/ÂΜL (ref 0.6–4.47)
LYMPHOCYTES NFR BLD AUTO: 24 % (ref 14–44)
MCH RBC QN AUTO: 30.4 PG (ref 26.8–34.3)
MCHC RBC AUTO-ENTMCNC: 33.6 G/DL (ref 31.4–37.4)
MCV RBC AUTO: 91 FL (ref 82–98)
MONOCYTES # BLD AUTO: 0.34 THOUSAND/ÂΜL (ref 0.17–1.22)
MONOCYTES NFR BLD AUTO: 4 % (ref 4–12)
NEUTROPHILS # BLD AUTO: 6.09 THOUSANDS/ÂΜL (ref 1.85–7.62)
NEUTS SEG NFR BLD AUTO: 70 % (ref 43–75)
NRBC BLD AUTO-RTO: 0 /100 WBCS
PLATELET # BLD AUTO: 256 THOUSANDS/UL (ref 149–390)
PMV BLD AUTO: 10.1 FL (ref 8.9–12.7)
PROTHROMBIN TIME: 13.2 SECONDS (ref 11.6–14.5)
RBC # BLD AUTO: 4.7 MILLION/UL (ref 3.81–5.12)
WBC # BLD AUTO: 8.63 THOUSAND/UL (ref 4.31–10.16)

## 2022-12-08 NOTE — PROGRESS NOTES
[facility] - Outpatient Consultation  Rex Ayala 29 y o  female MRN: [id number]  Encounter: [id number]    PCP:  Fernando Abbott, [facility], [phone]  Referring Provider:  Xi Villasenor DO, [phone]        ASSESSMENT AND PLAN:      I discussed with Ms Rah Valenzuela the natural history of fatty liver disease, including risks for development, and risk for progression to cirrhosis and its complications  We reviewed that there are no current medications that are FDA approved for the specific management of fatty liver disease  I explained that there are many, many compounds (both new and medications currently used for other indications) that are currently being studied to treat several aspects of fatty liver disease, including decreasing hepatic fat, hepatic inflammation, hepatic fibrosis, as well reducing risk factors for the development of fatty liver (primarily through weight loss)  We do not currently have active clinical trials through my office, but I will notify Ms Rah Valenzuela when we do if she meets criteria for inclusion  Her liver enzyme are currently 3-4 x ULN  However, I explained that even if liver enzymes are normal, it does not rule out low levels of active fatty inflammation in the liver  Ms Rah Valenzuela will have additional blood work done to rule out other causes of chronic liver disease/elevated liver enzymes, including  viral hepatitis, hepatitis A and B immunity testing, autoimmune hepatitis, hemochromatosis, Donal's disease and Alpha-1-antitrypsin deficiency    She does not have any physical, laboratory or radiologic evidence of cirrhosis or portal hypertension  I have requested abdominal ultrasound elastography with fat quantification (UGAP) to further assess hepatic fibrosis and steatosis in a non-invasive manner  I counseled Ms Rah Valenzuela on the importance of weight loss through lifestyle modification, primary diet and exercise    She is already seen her bariatric team in consultation for medical assisted weight loss  He is being evaluated for possible treatment with GLP-1 agonist     Ms Radha Longoria will have the testing done as outlined above and follow-up in 2 weeks via virtual visit to review results and formulate further evaluation vs treatment plan          FOLLOW-UP:  Return in about 2 weeks (around 12/22/2022)  VISIT DIAGNOSES AND ORDERS:      1  Elevation of levels of liver transaminase levels    2  Fatty liver      Orders Placed This Encounter   Procedures   • US elastography   • CBC and differential   • Protime-INR   • Comprehensive metabolic panel   • Antimitochondrial antibody   • Anti-smooth muscle antibody, IgG   • Hepatitis panel, acute   • Hepatitis B surface antibody   • Hepatitis A antibody, total   • IgG, IgA, IgM   • Soluble Liver Antigen (SLA) Autoantibody   • LIVER KIDNEY MICROSOME(LKM-1) AB IGG   • Ceruloplasmin   • Alpha 1 Antitrypsin Phenotype     ______________________________________________________________________    HPI:  Ms Digna Mullen is a 29 y o  female with medical history as outlined below, including history of SBO [with colonoscopy scheduled next week], SVT undergoing ablation in the near future, who is currently being evaluated for night sweats and fairly rapid weight gain, who comes in today for initial consultation for recent finding of fatty appearing enlarged liver on ultrasound done 2 weeks ago for evaluation of hyperhidrosis  She states she was found in the past to have elevated liver enzymes  Work-up done in the past at [facility] included autoimmune studies by rheumatologist, and iron studies  She does not recall having viral appetite screening  She denies a history of heavy alcohol use  She denies any known family history of liver disease or autoimmune disease  She states she gained slightly over 30 pounds after having her 2 children    This does not include the 20 pounds weight gain in the last 2 months  Ms Inna Pike denies recent or history of yellow eyes/skin, dark urine, GI bleeding, abdominal distention with fluid, lower extremity swelling, easy bruising, excessive bleeding, pruritus or confusion  She nausea, vomiting, heartburn, reflux, difficulty swallowing, early satiety  She states she does not eat excessively throughout the day  She complains of bloating and constipation  REVIEW OF SYSTEMS:    Review of Systems   Constitutional: Negative for fever  Gastrointestinal: Positive for abdominal pain and constipation  Negative for diarrhea, nausea and vomiting  Genitourinary: Negative for dysuria, frequency and hematuria  Musculoskeletal: Negative for arthralgias and myalgias  Neurological: Positive for headaches  Historical Information   Patient Active Problem List   Diagnosis   • Palpitations   • Hypertension   • Anxiety   • Seasonal allergic rhinitis   • Menorrhagia   • ALINE positive   • Other fatigue   • Flushing   • Encounter for annual routine gynecological examination   • Irregular menses   • Depression   • Insomnia   • SVT (supraventricular tachycardia) ([facility] [address] )   • IUD (intrauterine device) in place     Past Medical History:   Diagnosis Date   • Allergic    • Anxiety    • Depression    • Hypertension    • SVT (supraventricular tachycardia) ([facility] )      History reviewed  No pertinent surgical history    Social History   Social History     Substance and Sexual Activity   Alcohol Use Yes   • Alcohol/week: 2 0 standard drinks   • Types: 2 Glasses of wine per week    Comment: socially     Social History     Substance and Sexual Activity   Drug Use Never     Social History     Tobacco Use   Smoking Status Never   Smokeless Tobacco Never     Family History   Problem Relation Age of Onset   • Depression Mother    • Thyroid disease Mother    • Hypertension Mother    • Depression Father    • Depression Maternal Grandmother    • Thyroid disease Maternal Grandmother    • Hypertension Maternal Grandmother        Meds/Allergies       Current Outpatient Medications:   •  ascorbic acid (VITAMIN C) 500 mg tablet  •  Calcium Carb-Cholecalciferol (Oyster Shell Calcium) 500-400 MG-UNIT TABS  •  citalopram (CeleXA) 20 mg tablet  •  estradiol (Estrace) 1 mg tablet  •  folic acid (FOLVITE) 749 mcg tablet  •  lisinopril (ZESTRIL) 5 mg tablet  •  lubiprostone (AMITIZA) 8 mcg capsule  •  Magnesium ([employer] Triple Magnesium Complex) 400 MG CAPS  •  metoprolol tartrate (LOPRESSOR) 25 mg tablet  •  montelukast (SINGULAIR) 10 mg tablet  •  traZODone (DESYREL) 50 mg tablet  •  polyethylene glycol (GOLYTELY) 4000 mL solution    No Known Allergies        Objective     Blood pressure (!) 114/40, pulse 77, temperature 98 7 °F (37 1 °C), temperature source Tympanic, height 5' 9" (1 753 m), weight 103 kg (226 lb 3 2 oz), SpO2 97 %, not currently breastfeeding  Body mass index is 33 4 kg/m²  PHYSICAL EXAM:           Physical Exam  Vitals reviewed  Constitutional:       General: She is not in acute distress  Appearance: Normal appearance  She is obese  She is not ill-appearing  HENT:      Head: Normocephalic and atraumatic  Nose: Nose normal       Mouth/Throat:      Mouth: Mucous membranes are moist       Pharynx: Oropharynx is clear  Eyes:      General: No scleral icterus  Extraocular Movements: Extraocular movements intact  Cardiovascular:      Rate and Rhythm: Normal rate and regular rhythm  Heart sounds: No murmur heard  Pulmonary:      Effort: Pulmonary effort is normal  No respiratory distress  Breath sounds: Normal breath sounds  Abdominal:      General: Abdomen is flat  Palpations: Abdomen is soft  There is hepatomegaly  There is no shifting dullness, fluid wave or splenomegaly  Tenderness: There is abdominal tenderness (mild central abdomen on lower edge of liver)  Hernia: No hernia is present     Genitourinary:     Comments: deferred  Musculoskeletal:         General: Swelling (trace b/l LE edema) present  Normal range of motion  Cervical back: Normal range of motion and neck supple  No tenderness  Skin:     General: Skin is warm  Coloration: Skin is not jaundiced  Findings: No bruising or rash  Neurological:      General: No focal deficit present  Mental Status: She is alert and oriented to person, place, and time  Psychiatric:         Mood and Affect: Mood normal               Lab Results:   Lab Results   Component Value Date    SODIUM 138 11/08/2022    K 4 4 11/08/2022    BUN 15 11/08/2022    CREATININE 0 71 11/08/2022    TBILI 0 44 11/08/2022    ALKPHOS 64 11/08/2022    ALB 3 4 (L) 11/08/2022    AST 51 (H) 11/08/2022     (H) 11/08/2022    WBC 8 60 11/08/2022     11/08/2022    HGB 14 5 11/08/2022     No results found for: AFP  Lab Results   Component Value Date    IRON 75 04/29/2022    FERRITIN 55 04/29/2022    CONCFE 20 04/29/2022    ALINE Negative 04/29/2022    IGG 1,320 0 04/29/2022    IGM 78 0 04/29/2022    HEPBSAB 5 32 02/27/2017     Lab Results   Component Value Date    HGBA1C 5 2 04/29/2022     Radiology Results:   I have reviewed the results of any radiology studies performed within the last 90 days  This includes:      XR chest pa & lateral    Result Date: 11/13/2022  Narrative: CHEST INDICATION:   R61: Generalized hyperhidrosis  COMPARISON:  CXR as part of an abdominal series from 10/20/2005  EXAM PERFORMED/VIEWS:  XR CHEST PA & LATERAL FINDINGS: Cardiomediastinal silhouette appears unremarkable  The lungs are clear  No pneumothorax or pleural effusion  Osseous structures appear within normal limits for patient age  Impression: No acute cardiopulmonary disease  Workstation performed: [id number]     MRI brain w wo contrast    Result Date: 11/28/2022  Narrative: MRI BRAIN WITH AND WITHOUT CONTRAST INDICATION: G44 52: New daily persistent headache (ndph)  COMPARISON:  None   TECHNIQUE: Multiplanar, multisequence imaging of the brain was performed before and after gadolinium administration  IV Contrast:  10 mL of Gadobutrol injection (SINGLE-DOSE)  IMAGE QUALITY:   Diagnostic  FINDINGS: BRAIN PARENCHYMA:  There is no discrete mass, mass effect or midline shift  There is no intracranial hemorrhage  Normal posterior fossa  Diffusion imaging is unremarkable  Solitary subcortical white matter lesion in the parasagittal aspect of the right frontal lobe, series 5, image 19, a finding of undetermined etiology and/or clinical significance  No pathologic enhancement or associated diffusion restriction  No additional white matter lesions detected  Postcontrast imaging of the brain demonstrates no abnormal enhancement  VENTRICLES:  Normal for the patient's age  SELLA AND PITUITARY GLAND:  Normal  ORBITS:  Normal  PARANASAL SINUSES:  Mucosal thickening of the sinuses with no air fluid levels  VASCULATURE:  Evaluation of the major intracranial vasculature demonstrates appropriate flow voids  CALVARIUM AND SKULL BASE:  Normal  EXTRACRANIAL SOFT TISSUES:  Normal      Impression: 1  No acute infarction, intracranial hemorrhage or mass  2   Solitary subcortical white matter lesion, a finding of undetermined etiology and/or clinical significance  Workstation performed: [id number]     US abdomen complete    Result Date: 11/25/2022  Narrative: ABDOMEN ULTRASOUND, COMPLETE INDICATION:   R61: Generalized hyperhidrosis  COMPARISON:  None TECHNIQUE:   Real-time ultrasound of the abdomen was performed with a curvilinear transducer with both volumetric sweeps and still imaging techniques  FINDINGS: PANCREAS:  Visualized portions of the pancreas are within normal limits  AORTA AND IVC:  Visualized portions are normal for patient age  LIVER: Size:  Enlarged  The liver measures 23 4 cm in the midclavicular line  Contour:  Surface contour is smooth  Parenchyma:   There is moderate diffuse increased echogenicity with smooth echotexture and acoustic beam attenuation  Most consistent with moderate hepatic steatosis  No liver mass identified  Limited imaging of the main portal vein shows it to be patent and hepatopetal  BILIARY: No gallbladder findings  No intrahepatic biliary dilatation  CBD measures 5 0 mm  No choledocholithiasis  KIDNEY: Right kidney measures 11 7 x 5 8 x 5 8  cm  Volume 206 2 mL Kidney within normal limits  Left kidney measures 12 1 x 5 9 x 5 4 cm  Volume 203 6 mL Kidney within normal limits  SPLEEN: Measures 11 3 x 12 2 x 6 2 cm  Volume 444 9 mL Within normal limits  ASCITES:  None  Impression: Hepatomegaly with diffuse fatty liver infiltration  Mild splenomegaly  Workstation performed: [id number]     VAS renal artery complete    Result Date: 11/29/2022  Narrative:  [facility] REPORT CLINICAL: Indications: Patient presents to evaluate the renal arteries secondary to HTN  Operative History: Patient denies any cardiovascular surgery  Risk Factors The patient has history of HTN  Clinical Right Pressure:  110/60 mm Hg, Left Pressure:  120/60 mm Hg  FINDINGS:  Unilateral     PSV (cm/s)  EDV (cm/s)    RI  Sup-Blanca Ao            132          22  0 83  Celiac                224          73        Px Inf-Chad Ao         165          29  0 82  Ds Inf-Chad Ao         117          20  0 83  Prox   SMA             197          21         Right          PSV (cm/s)  EDV (cm/s)   RAR    RI  Kidney (cm)  Ostial Renal          160          49  1 22  0 70               Prox Renal            135          46  1 02                     Mid Renal              95          31  0 72                     Dist Renal             71          27  0 54  0 62               Celiac Artery                                0 72               Kidney                                                   12 30  Upper Pole             20           6        0 72               Mid Pole               28          10        0 63               Lower Pole             26           9 0 66                Left           PSV (cm/s)  EDV (cm/s)   RAR    RI  Kidney (cm)  Ostial Renal           96          18  0 72                     Prox Renal            153          42  1 16                     Mid Renal             129          44  0 98  0 66               Dist Renal            175          57  1 33  0 67               Celiac Artery                                0 67               Kidney                                                   11 76  Upper Pole             24           8        0 67               Mid Pole               28           9        0 66               Lower Pole             22           8        0 64                  CONCLUSION: Impression The abdominal aorta is widely patent and normal caliber  RIGHT RENAL: No evidence of significant arterial occlusive disease in the main renal artery  Patent renal vein Renovascular resistive index of 0 72  Renal/Aorta Ratio: 1 2  The Kidney measures approximately 12 3 cm  LEFT RENAL: No evidence of significant arterial occlusive disease in the main renal artery  Patent renal vein Renovascular resistive index of 0 67  Renal/Aorta Ratio: 1 3  The Kidney measures approximately 11 8 cm  MESENTERIC: Celiac and superior mesenteric arteries are patent  SIGNATURE: Electronically Signed by: Windy Lee on 2022-11-29 09:13:16 AM        Rhonda Masterson MD  Hepatology/Gastroenterology  Answers for HPI/ROS submitted by the patient on 12/7/2022  Chronicity: recurrent  Onset: more than 1 year ago  Onset quality: gradual  Frequency: intermittently  Episode duration: 1 Hours  Progression since onset: unchanged  Pain location: epigastric region  Pain - numeric: 2/10  Pain quality: cramping, a sensation of fullness  Radiates to: back  anorexia: No  belching: No  flatus: No  hematochezia: No  melena:  No  weight loss: No  Aggravated by: certain positions  Relieved by: standing  Diagnostic workup: CT scan, ultrasound

## 2022-12-09 LAB
ACTIN IGG SERPL-ACNC: 4 UNITS (ref 0–19)
ALBUMIN SERPL BCP-MCNC: 4 G/DL (ref 3.5–5)
ALP SERPL-CCNC: 61 U/L (ref 46–116)
ALT SERPL W P-5'-P-CCNC: 113 U/L (ref 12–78)
ANION GAP SERPL CALCULATED.3IONS-SCNC: 5 MMOL/L (ref 4–13)
AST SERPL W P-5'-P-CCNC: 64 U/L (ref 5–45)
BILIRUB SERPL-MCNC: 0.51 MG/DL (ref 0.2–1)
BUN SERPL-MCNC: 10 MG/DL (ref 5–25)
CALCIUM SERPL-MCNC: 9.2 MG/DL (ref 8.3–10.1)
CERULOPLASMIN SERPL-MCNC: 23.9 MG/DL (ref 19–39)
CHLORIDE SERPL-SCNC: 108 MMOL/L (ref 96–108)
CO2 SERPL-SCNC: 24 MMOL/L (ref 21–32)
CREAT SERPL-MCNC: 0.62 MG/DL (ref 0.6–1.3)
GFR SERPL CREATININE-BSD FRML MDRD: 121 ML/MIN/1.73SQ M
GLUCOSE P FAST SERPL-MCNC: 79 MG/DL (ref 65–99)
HAV AB SER QL IA: NORMAL
HAV IGM SER QL: NORMAL
HBV CORE IGM SER QL: NORMAL
HBV SURFACE AB SER-ACNC: 4.07 MIU/ML
HBV SURFACE AG SER QL: NORMAL
HCV AB SER QL: NORMAL
IGA SERPL-MCNC: 212 MG/DL (ref 70–400)
IGG SERPL-MCNC: 1400 MG/DL (ref 700–1600)
IGM SERPL-MCNC: 95 MG/DL (ref 40–230)
MITOCHONDRIA M2 IGG SER-ACNC: <20 UNITS (ref 0–20)
POTASSIUM SERPL-SCNC: 4 MMOL/L (ref 3.5–5.3)
PROT SERPL-MCNC: 7.9 G/DL (ref 6.4–8.4)
SODIUM SERPL-SCNC: 137 MMOL/L (ref 135–147)

## 2022-12-12 ENCOUNTER — HOSPITAL ENCOUNTER (OUTPATIENT)
Dept: RADIOLOGY | Facility: HOSPITAL | Age: 32
Discharge: HOME | End: 2022-12-12
Attending: INTERNAL MEDICINE

## 2022-12-12 DIAGNOSIS — R74.01 ELEVATION OF LEVELS OF LIVER TRANSAMINASE LEVELS: ICD-10-CM

## 2022-12-12 DIAGNOSIS — K76.0 FATTY LIVER: ICD-10-CM

## 2022-12-13 ENCOUNTER — PATIENT MESSAGE (OUTPATIENT)
Dept: GASTROENTEROLOGY | Facility: CLINIC | Age: 32
End: 2022-12-13

## 2022-12-13 ENCOUNTER — DOCUMENTATION (OUTPATIENT)
Dept: GYNECOLOGIC ONCOLOGY | Facility: CLINIC | Age: 32
End: 2022-12-13

## 2022-12-13 DIAGNOSIS — J06.9 UPPER RESPIRATORY TRACT INFECTION, UNSPECIFIED TYPE: Primary | ICD-10-CM

## 2022-12-13 DIAGNOSIS — K59.09 OTHER CONSTIPATION: Primary | ICD-10-CM

## 2022-12-13 LAB — SOLUBLE LIVER IGG SER IA-ACNC: 1.8 UNITS (ref 0–20)

## 2022-12-13 RX ORDER — AZITHROMYCIN 250 MG/1
TABLET, FILM COATED ORAL
Qty: 6 TABLET | Refills: 0 | Status: SHIPPED | OUTPATIENT
Start: 2022-12-13 | End: 2022-12-17

## 2022-12-13 RX ORDER — SODIUM PICOSULFATE, MAGNESIUM OXIDE, AND ANHYDROUS CITRIC ACID 10; 3.5; 12 MG/160ML; G/160ML; G/160ML
LIQUID ORAL
Qty: 320 ML | Refills: 0 | Status: SHIPPED | OUTPATIENT
Start: 2022-12-13

## 2022-12-13 NOTE — PROGRESS NOTES
Pt with persistent pelvic pain and bleeding with IUD in place  Images reviewed with well positioned IUD  Pt requested removal     IUD removed without difficulty intact      Will f/u with gyn    Marlene Yanez MD, [facility] Rain

## 2022-12-14 LAB
A1AT PHENOTYP SERPL IFE: NORMAL
A1AT SERPL-MCNC: 146 MG/DL (ref 100–188)

## 2022-12-21 ENCOUNTER — APPOINTMENT (OUTPATIENT)
Dept: URGENT CARE | Age: 32
End: 2022-12-21

## 2022-12-21 DIAGNOSIS — Z01.812 ENCOUNTER FOR PREOPERATIVE SCREENING LABORATORY TESTING FOR COVID-19 VIRUS: ICD-10-CM

## 2022-12-21 DIAGNOSIS — Z20.822 ENCOUNTER FOR PREOPERATIVE SCREENING LABORATORY TESTING FOR COVID-19 VIRUS: ICD-10-CM

## 2022-12-22 LAB — SARS-COV-2 RNA RESP QL NAA+PROBE: NEGATIVE

## 2022-12-23 ENCOUNTER — TELEMEDICINE (OUTPATIENT)
Dept: GASTROENTEROLOGY | Facility: CLINIC | Age: 32
End: 2022-12-23

## 2022-12-23 VITALS — BODY MASS INDEX: 33.03 KG/M2 | HEIGHT: 69 IN | WEIGHT: 223 LBS

## 2022-12-23 DIAGNOSIS — K76.0 FATTY LIVER: ICD-10-CM

## 2022-12-23 DIAGNOSIS — R74.01 ELEVATION OF LEVELS OF LIVER TRANSAMINASE LEVELS: Primary | ICD-10-CM

## 2022-12-23 NOTE — PATIENT INSTRUCTIONS
Continue weight loss efforts  Follow-up with weight management  Have blood work done as schedule for cardiology and again for me in March  We will discuss the results and decide if we need a liver biopsy at that time

## 2022-12-23 NOTE — PROGRESS NOTES
Virtual Regular Visit    Verification of patient location:    Patient is located in the following state in which I hold an active license [place]      Assessment/Plan:    Problem List Items Addressed This Visit    None  Visit Diagnoses     Elevation of levels of liver transaminase levels    -  Primary    Relevant Orders    Comprehensive metabolic panel    Fatty liver        Relevant Orders    Comprehensive metabolic panel        Thorough serologic work-up was negative for viral and autoimmune hepatitis, hemochromatosis, alpha-1 antitrypsin deficiency and Donal's disease  Abdominal ultrasound elastography showed S3 steatosis with minimal to no fibrosis  At this point in time, we discussed management moving forward and ultimately decided that she will put forth a better effort with weight loss by following up with weight management and then increase her exercise after cardiac ablation has been done and she has been given clearance of health from cardiology  She has blood work scheduled for mid January and will have repeat blood work in March before following up with me in April  At that time, if she has significant weight loss however no change in liver enzymes, we will proceed with obtaining a liver biopsy to confirm the diagnosis of nonalcoholic steatohepatitis versus the presence of another underlying etiology  Reason for visit is   Chief Complaint   Patient presents with   • Follow-up     F/u to discuss lab results    • Virtual Regular Visit        Encounter provider Benji Fraser MD    Provider located at [address]  [phone]      Recent Visits  No visits were found meeting these conditions    Showing recent visits within past 7 days and meeting all other requirements  Today's Visits  Date Type Provider Dept   12/23/22 Telemedicine Benji Fraser MD [address] [place] [address] New    Showing today's visits and meeting all other requirements  Future Appointments  No visits were found meeting these conditions  Showing future appointments within next 150 days and meeting all other requirements       The patient was identified by name and date of birth  Elise Pandey was informed that this is a telemedicine visit and that the visit is being conducted through Telephone  My office door was closed  No one else was in the room  She acknowledged consent and understanding of privacy and security of the video platform  The patient has agreed to participate and understands they can discontinue the visit at any time  We were unable to connect via video and ultimately connected via telephone call  Patient is aware this is a billable service  Subjective  Elise Pandey is a 29 y o  female with elevated LFTs with ALT 4 times upper limit of normal and fatty appearing liver on imaging in the setting of abdominal obesity and hypertension who follows up today via virtual visit after blood work and abdominal imaging was done  She states she is in her usual state of health and has not had any major changes  Her planned cardiac ablation on December 27 has been postponed to January 30  She has not been able to strenuously exercise due to that delay  She has lost 3 pounds over the last few weeks, however despite her efforts with diet she has not been able to lose more  Ms Sonja Almaguer denies recent or history of yellow eyes/skin, dark urine, GI bleeding, abdominal distention with fluid, lower extremity swelling, easy bruising, excessive bleeding, pruritus or confusion  HPI     Past Medical History:   Diagnosis Date   • Allergic    • Anxiety    • Depression    • Hypertension    • SVT (supraventricular tachycardia) ([facility] [address] )        History reviewed  No pertinent surgical history      Current Outpatient Medications   Medication Sig Dispense Refill   • ascorbic acid (VITAMIN C) 500 mg tablet Take 500 mg by mouth in the morning     • Calcium Carb-Cholecalciferol (Oyster Shell Calcium) 500-400 MG-UNIT TABS Take 500 mg by mouth in the morning     • citalopram (CeleXA) 20 mg tablet Take 1 tablet (20 mg total) by mouth daily 90 tablet 0   • folic acid (FOLVITE) 723 mcg tablet Take 400 mcg by mouth in the morning     • lisinopril (ZESTRIL) 5 mg tablet Take 1 tablet (5 mg total) by mouth daily 90 tablet 0   • estradiol (Estrace) 1 mg tablet Take 1 tablet (1 mg total) by mouth daily 30 tablet 0   • lubiprostone (AMITIZA) 8 mcg capsule Take 1 capsule (8 mcg total) by mouth daily with breakfast (Patient not taking: Reported on 12/23/2022) 30 capsule 2   • Magnesium ([employer] Triple Magnesium Complex) 400 MG CAPS Take 400 mg by mouth in the morning     • metoprolol tartrate (LOPRESSOR) 25 mg tablet Take 1 tablet (25 mg total) by mouth every 12 (twelve) hours (Patient not taking: Reported on 12/23/2022) 60 tablet 1   • montelukast (SINGULAIR) 10 mg tablet Take 1 tablet (10 mg total) by mouth daily at bedtime 90 tablet 0   • polyethylene glycol (GOLYTELY) 4000 mL solution PLEASE FOLLOW INSTRUCTIONS AS PROVIDED BY THE OFFICE (Patient not taking: Reported on 11/17/2022) 4000 mL 0   • sodium picosulfate, magnesium oxide, citric acid (Clenpiq) 10-3 5-12 MG-GM -GM/160ML SOLN Use as directed 320 mL 0   • traZODone (DESYREL) 50 mg tablet Take 1 tablet (50 mg total) by mouth daily at bedtime 30 tablet 0     No current facility-administered medications for this visit  No Known Allergies    Review of Systems   All other systems reviewed and are negative  Video Exam    Vitals:    12/23/22 1255   Weight: 101 kg (223 lb)   Height: 5' 9" (1 753 m)       Physical Exam     I spent 15 minutes with patient today in which greater than 50% of the time was spent in counseling/coordination of care regarding weight management and fatty liver disease

## 2022-12-27 ENCOUNTER — TELEPHONE (OUTPATIENT)
Dept: GASTROENTEROLOGY | Facility: CLINIC | Age: 32
End: 2022-12-27

## 2022-12-27 DIAGNOSIS — I10 HYPERTENSION, UNSPECIFIED TYPE: ICD-10-CM

## 2022-12-27 DIAGNOSIS — Z30.011 ENCOUNTER FOR INITIAL PRESCRIPTION OF CONTRACEPTIVE PILLS: Primary | ICD-10-CM

## 2022-12-27 RX ORDER — ACETAMINOPHEN AND CODEINE PHOSPHATE 120; 12 MG/5ML; MG/5ML
1 SOLUTION ORAL DAILY
Qty: 28 TABLET | Refills: 6 | Status: SHIPPED | OUTPATIENT
Start: 2022-12-27 | End: 2023-08-30 | Stop reason: ALTCHOICE

## 2022-12-27 NOTE — TELEPHONE ENCOUNTER
Left message for patient re: rescheduling her colonoscopy  Clenpiq was denied, needs prior auth  Patient has an  upcoming cardiology procedure in January  Wasn't sure with the end of the year if we did a prior auth if it would carry over to the new year

## 2022-12-27 NOTE — TELEPHONE ENCOUNTER
Called patient, lvm to call and schedule 4M F/U (April) with Dr Justo Lance at [facility]

## 2023-01-03 ENCOUNTER — TELEPHONE (OUTPATIENT)
Dept: GASTROENTEROLOGY | Facility: CLINIC | Age: 33
End: 2023-01-03

## 2023-01-03 LAB — MISCELLANEOUS LAB TEST RESULT: NORMAL

## 2023-01-03 NOTE — TELEPHONE ENCOUNTER
Patients GI provider:  Dr Kellee Cole    Number to return call: [phone]    Reason for call: Pt would like another CMP ordered with a sooner expected date as she has started a new medication      Scheduled procedure/appointment date if applicable: Apt 4/75

## 2023-01-16 ENCOUNTER — APPOINTMENT (OUTPATIENT)
Dept: LAB | Facility: CLINIC | Age: 33
End: 2023-01-16

## 2023-01-16 LAB
ALBUMIN SERPL BCP-MCNC: 3.9 G/DL (ref 3.5–5)
ALP SERPL-CCNC: 70 U/L (ref 46–116)
ALT SERPL W P-5'-P-CCNC: 195 U/L (ref 12–78)
ANION GAP SERPL CALCULATED.3IONS-SCNC: 6 MMOL/L (ref 4–13)
AST SERPL W P-5'-P-CCNC: 108 U/L (ref 5–45)
BASOPHILS # BLD AUTO: 0.05 THOUSANDS/ÂΜL (ref 0–0.1)
BASOPHILS NFR BLD AUTO: 1 % (ref 0–1)
BILIRUB SERPL-MCNC: 0.48 MG/DL (ref 0.2–1)
BUN SERPL-MCNC: 10 MG/DL (ref 5–25)
CALCIUM SERPL-MCNC: 9.8 MG/DL (ref 8.3–10.1)
CHLORIDE SERPL-SCNC: 105 MMOL/L (ref 96–108)
CO2 SERPL-SCNC: 26 MMOL/L (ref 21–32)
CREAT SERPL-MCNC: 0.62 MG/DL (ref 0.6–1.3)
EOSINOPHIL # BLD AUTO: 0.08 THOUSAND/ÂΜL (ref 0–0.61)
EOSINOPHIL NFR BLD AUTO: 1 % (ref 0–6)
ERYTHROCYTE [DISTWIDTH] IN BLOOD BY AUTOMATED COUNT: 12.2 % (ref 11.6–15.1)
GFR SERPL CREATININE-BSD FRML MDRD: 121 ML/MIN/1.73SQ M
GLUCOSE SERPL-MCNC: 78 MG/DL (ref 65–140)
HCT VFR BLD AUTO: 43.9 % (ref 34.8–46.1)
HGB BLD-MCNC: 14.2 G/DL (ref 11.5–15.4)
IMM GRANULOCYTES # BLD AUTO: 0.03 THOUSAND/UL (ref 0–0.2)
IMM GRANULOCYTES NFR BLD AUTO: 0 % (ref 0–2)
LYMPHOCYTES # BLD AUTO: 3.32 THOUSANDS/ÂΜL (ref 0.6–4.47)
LYMPHOCYTES NFR BLD AUTO: 31 % (ref 14–44)
MCH RBC QN AUTO: 29.3 PG (ref 26.8–34.3)
MCHC RBC AUTO-ENTMCNC: 32.3 G/DL (ref 31.4–37.4)
MCV RBC AUTO: 91 FL (ref 82–98)
MONOCYTES # BLD AUTO: 0.58 THOUSAND/ÂΜL (ref 0.17–1.22)
MONOCYTES NFR BLD AUTO: 5 % (ref 4–12)
NEUTROPHILS # BLD AUTO: 6.7 THOUSANDS/ÂΜL (ref 1.85–7.62)
NEUTS SEG NFR BLD AUTO: 62 % (ref 43–75)
NRBC BLD AUTO-RTO: 0 /100 WBCS
PLATELET # BLD AUTO: 270 THOUSANDS/UL (ref 149–390)
PMV BLD AUTO: 10.8 FL (ref 8.9–12.7)
POTASSIUM SERPL-SCNC: 4.1 MMOL/L (ref 3.5–5.3)
PROT SERPL-MCNC: 8.2 G/DL (ref 6.4–8.4)
RBC # BLD AUTO: 4.85 MILLION/UL (ref 3.81–5.12)
SODIUM SERPL-SCNC: 137 MMOL/L (ref 135–147)
WBC # BLD AUTO: 10.76 THOUSAND/UL (ref 4.31–10.16)

## 2023-01-17 ENCOUNTER — PREP FOR PROCEDURE (OUTPATIENT)
Dept: INTERVENTIONAL RADIOLOGY/VASCULAR | Facility: CLINIC | Age: 33
End: 2023-01-17

## 2023-01-17 DIAGNOSIS — K76.0 FATTY LIVER: ICD-10-CM

## 2023-01-17 DIAGNOSIS — R79.89 LFT ELEVATION: Primary | ICD-10-CM

## 2023-01-17 DIAGNOSIS — R74.01 ELEVATION OF LEVELS OF LIVER TRANSAMINASE LEVELS: Primary | ICD-10-CM

## 2023-01-29 ENCOUNTER — ANESTHESIA EVENT (OUTPATIENT)
Dept: NON INVASIVE DIAGNOSTICS | Facility: HOSPITAL | Age: 33
End: 2023-01-29

## 2023-01-30 ENCOUNTER — ANESTHESIA (OUTPATIENT)
Dept: NON INVASIVE DIAGNOSTICS | Facility: HOSPITAL | Age: 33
End: 2023-01-30

## 2023-01-30 ENCOUNTER — HOSPITAL ENCOUNTER (OUTPATIENT)
Facility: HOSPITAL | Age: 33
Setting detail: OUTPATIENT SURGERY
Discharge: HOME | End: 2023-01-30
Attending: INTERNAL MEDICINE | Admitting: INTERNAL MEDICINE

## 2023-01-30 VITALS
HEIGHT: 69 IN | HEART RATE: 88 BPM | BODY MASS INDEX: 33.53 KG/M2 | SYSTOLIC BLOOD PRESSURE: 118 MMHG | OXYGEN SATURATION: 95 % | DIASTOLIC BLOOD PRESSURE: 71 MMHG | RESPIRATION RATE: 18 BRPM | WEIGHT: 226.4 LBS | TEMPERATURE: 97.4 F

## 2023-01-30 DIAGNOSIS — I47.1 SVT (SUPRAVENTRICULAR TACHYCARDIA) (HCC): ICD-10-CM

## 2023-01-30 LAB
ANION GAP SERPL CALCULATED.3IONS-SCNC: 5 MMOL/L (ref 4–13)
ATRIAL RATE: 89 BPM
ATRIAL RATE: 93 BPM
BASOPHILS # BLD AUTO: 0.04 THOUSANDS/ÂΜL (ref 0–0.1)
BASOPHILS NFR BLD AUTO: 1 % (ref 0–1)
BUN SERPL-MCNC: 8 MG/DL (ref 5–25)
CALCIUM SERPL-MCNC: 8.9 MG/DL (ref 8.3–10.1)
CHLORIDE SERPL-SCNC: 108 MMOL/L (ref 96–108)
CO2 SERPL-SCNC: 26 MMOL/L (ref 21–32)
CREAT SERPL-MCNC: 0.72 MG/DL (ref 0.6–1.3)
EOSINOPHIL # BLD AUTO: 0.11 THOUSAND/ÂΜL (ref 0–0.61)
EOSINOPHIL NFR BLD AUTO: 1 % (ref 0–6)
ERYTHROCYTE [DISTWIDTH] IN BLOOD BY AUTOMATED COUNT: 12.5 % (ref 11.6–15.1)
GFR SERPL CREATININE-BSD FRML MDRD: 112 ML/MIN/1.73SQ M
GLUCOSE P FAST SERPL-MCNC: 111 MG/DL (ref 65–99)
GLUCOSE SERPL-MCNC: 111 MG/DL (ref 65–140)
HCG SERPL QL: NEGATIVE
HCT VFR BLD AUTO: 43.6 % (ref 34.8–46.1)
HGB BLD-MCNC: 14.4 G/DL (ref 11.5–15.4)
IMM GRANULOCYTES # BLD AUTO: 0.05 THOUSAND/UL (ref 0–0.2)
IMM GRANULOCYTES NFR BLD AUTO: 1 % (ref 0–2)
INR PPP: 0.95 (ref 0.84–1.19)
LYMPHOCYTES # BLD AUTO: 2.62 THOUSANDS/ÂΜL (ref 0.6–4.47)
LYMPHOCYTES NFR BLD AUTO: 30 % (ref 14–44)
MCH RBC QN AUTO: 29.2 PG (ref 26.8–34.3)
MCHC RBC AUTO-ENTMCNC: 33 G/DL (ref 31.4–37.4)
MCV RBC AUTO: 88 FL (ref 82–98)
MONOCYTES # BLD AUTO: 0.38 THOUSAND/ÂΜL (ref 0.17–1.22)
MONOCYTES NFR BLD AUTO: 4 % (ref 4–12)
NEUTROPHILS # BLD AUTO: 5.58 THOUSANDS/ÂΜL (ref 1.85–7.62)
NEUTS SEG NFR BLD AUTO: 63 % (ref 43–75)
NRBC BLD AUTO-RTO: 0 /100 WBCS
P AXIS: 40 DEGREES
P AXIS: 40 DEGREES
PLATELET # BLD AUTO: 250 THOUSANDS/UL (ref 149–390)
PMV BLD AUTO: 10.3 FL (ref 8.9–12.7)
POTASSIUM SERPL-SCNC: 3.8 MMOL/L (ref 3.5–5.3)
PR INTERVAL: 154 MS
PR INTERVAL: 184 MS
PROTHROMBIN TIME: 12.8 SECONDS (ref 11.6–14.5)
QRS AXIS: 64 DEGREES
QRS AXIS: 75 DEGREES
QRSD INTERVAL: 106 MS
QRSD INTERVAL: 108 MS
QT INTERVAL: 362 MS
QT INTERVAL: 372 MS
QTC INTERVAL: 440 MS
QTC INTERVAL: 462 MS
RBC # BLD AUTO: 4.93 MILLION/UL (ref 3.81–5.12)
SODIUM SERPL-SCNC: 139 MMOL/L (ref 135–147)
T WAVE AXIS: 12 DEGREES
T WAVE AXIS: 25 DEGREES
VENTRICULAR RATE: 89 BPM
VENTRICULAR RATE: 93 BPM
WBC # BLD AUTO: 8.78 THOUSAND/UL (ref 4.31–10.16)

## 2023-01-30 RX ORDER — KETAMINE HYDROCHLORIDE 50 MG/ML
INJECTION, SOLUTION, CONCENTRATE INTRAMUSCULAR; INTRAVENOUS AS NEEDED
Status: DISCONTINUED | OUTPATIENT
Start: 2023-01-30 | End: 2023-01-30

## 2023-01-30 RX ORDER — PROPOFOL 10 MG/ML
INJECTION, EMULSION INTRAVENOUS AS NEEDED
Status: DISCONTINUED | OUTPATIENT
Start: 2023-01-30 | End: 2023-01-30

## 2023-01-30 RX ORDER — LIDOCAINE HYDROCHLORIDE 10 MG/ML
INJECTION, SOLUTION EPIDURAL; INFILTRATION; INTRACAUDAL; PERINEURAL CODE/TRAUMA/SEDATION MEDICATION
Status: DISCONTINUED | OUTPATIENT
Start: 2023-01-30 | End: 2023-01-30 | Stop reason: HOSPADM

## 2023-01-30 RX ORDER — MIDAZOLAM HYDROCHLORIDE 2 MG/2ML
INJECTION, SOLUTION INTRAMUSCULAR; INTRAVENOUS AS NEEDED
Status: DISCONTINUED | OUTPATIENT
Start: 2023-01-30 | End: 2023-01-30

## 2023-01-30 RX ORDER — ASPIRIN 81 MG/1
81 TABLET, CHEWABLE ORAL DAILY
Qty: 30 TABLET | Refills: 0
Start: 2023-01-30 | End: 2023-03-01

## 2023-01-30 RX ORDER — FENTANYL CITRATE 50 UG/ML
INJECTION, SOLUTION INTRAMUSCULAR; INTRAVENOUS AS NEEDED
Status: DISCONTINUED | OUTPATIENT
Start: 2023-01-30 | End: 2023-01-30

## 2023-01-30 RX ORDER — ACETAMINOPHEN 325 MG/1
650 TABLET ORAL EVERY 4 HOURS PRN
Status: DISCONTINUED | OUTPATIENT
Start: 2023-01-30 | End: 2023-01-30 | Stop reason: HOSPADM

## 2023-01-30 RX ORDER — SODIUM CHLORIDE 9 MG/ML
INJECTION, SOLUTION INTRAVENOUS CONTINUOUS PRN
Status: DISCONTINUED | OUTPATIENT
Start: 2023-01-30 | End: 2023-01-30

## 2023-01-30 RX ORDER — PROPOFOL 10 MG/ML
INJECTION, EMULSION INTRAVENOUS CONTINUOUS PRN
Status: DISCONTINUED | OUTPATIENT
Start: 2023-01-30 | End: 2023-01-30

## 2023-01-30 RX ORDER — HEPARIN SODIUM 1000 [USP'U]/ML
INJECTION, SOLUTION INTRAVENOUS; SUBCUTANEOUS CODE/TRAUMA/SEDATION MEDICATION
Status: DISCONTINUED | OUTPATIENT
Start: 2023-01-30 | End: 2023-01-30 | Stop reason: HOSPADM

## 2023-01-30 RX ADMIN — FENTANYL CITRATE 50 MCG: 50 INJECTION, SOLUTION INTRAMUSCULAR; INTRAVENOUS at 09:32

## 2023-01-30 RX ADMIN — REMIFENTANIL HYDROCHLORIDE 0.02 MCG/KG/MIN: 1 INJECTION, POWDER, LYOPHILIZED, FOR SOLUTION INTRAVENOUS at 09:53

## 2023-01-30 RX ADMIN — LIDOCAINE HYDROCHLORIDE 100 MG: 20 INJECTION INTRAVENOUS at 09:50

## 2023-01-30 RX ADMIN — KETAMINE HYDROCHLORIDE 20 MG: 50 INJECTION, SOLUTION INTRAMUSCULAR; INTRAVENOUS at 09:50

## 2023-01-30 RX ADMIN — PROPOFOL 30 MG: 10 INJECTION, EMULSION INTRAVENOUS at 09:50

## 2023-01-30 RX ADMIN — FENTANYL CITRATE 50 MCG: 50 INJECTION, SOLUTION INTRAMUSCULAR; INTRAVENOUS at 09:38

## 2023-01-30 RX ADMIN — SODIUM CHLORIDE: 0.9 INJECTION, SOLUTION INTRAVENOUS at 09:32

## 2023-01-30 RX ADMIN — ACETAMINOPHEN 650 MG: 325 TABLET ORAL at 11:56

## 2023-01-30 RX ADMIN — MIDAZOLAM 2 MG: 1 INJECTION INTRAMUSCULAR; INTRAVENOUS at 09:32

## 2023-01-30 RX ADMIN — PROPOFOL 150 MCG/KG/MIN: 10 INJECTION, EMULSION INTRAVENOUS at 09:53

## 2023-01-30 RX ADMIN — KETAMINE HYDROCHLORIDE 30 MG: 50 INJECTION, SOLUTION INTRAMUSCULAR; INTRAVENOUS at 09:58

## 2023-01-30 NOTE — H&P
H&P Exam - Cardiology   Elise Pandey 29 y o  female MRN: [id number]  Unit/Bed#: [facility] CATH LAB ROOM Encounter: [id number]    Assessment/Plan     Assessment:  1  Recurrent symptomatic SVT, with breakthrough despite metoprolol  2  Normal LV systolic function with EF 60% per echo 6/2022  3  Hypertension, well controlled on lisinopril      Plan:  EPS/SVT ablation       History of Present Illness   HPI:  Elise Pandey is a 29y o  year old female with recurrent SVT/frequent palpitations, hypertension, and normal LV systolic function  She has a longstanding history of palpitations, and was seen by Dr Arti Christopher 5/2022 to establish care given an increase in episode frequency  An event monitor was attempted however fell off within several days  Instead, she began at home monitoring with [employer] mobile and was eventually noted to capture an episode of SVT with rates over 200 beats per minute  She was started on metoprolol at that time, however continued to have breakthrough episodes and thus was seen in EP consultation by Dr Adame August 11/2022  At that time, different treatment options were discussed and ultimately an EPS/SVT ablation was recommended  She presents today to undergo that procedure  She has held her metoprolol as instructed, and took a baby aspirin last night  Review of Systems  ROS as noted above, otherwise 12 point review of systems was performed and is negative  Historical Information   Past Medical History:   Diagnosis Date   • Allergic    • Anxiety    • Depression    • Hypertension    • SVT (supraventricular tachycardia) ([facility] [address] )      History reviewed  No pertinent surgical history    Family History:   Family History   Problem Relation Age of Onset   • Depression Mother    • Thyroid disease Mother    • Hypertension Mother    • Depression Father    • Depression Maternal Grandmother    • Thyroid disease Maternal Grandmother    • Hypertension Maternal Grandmother        Social History   Social History     Substance and Sexual Activity   Alcohol Use Yes   • Alcohol/week: 2 0 standard drinks   • Types: 2 Glasses of wine per week    Comment: socially     Social History     Substance and Sexual Activity   Drug Use Never     Social History     Tobacco Use   Smoking Status Never   Smokeless Tobacco Never         Meds/Allergies   all medications and allergies reviewed  Home Medications:   Medications Prior to Admission   Medication   • citalopram (CeleXA) 20 mg tablet   • montelukast (SINGULAIR) 10 mg tablet   • norethindrone (Ortho Micronor) 0 35 MG tablet   • lisinopril (ZESTRIL) 5 mg tablet   • lubiprostone (AMITIZA) 8 mcg capsule       No Known Allergies    Objective   Vitals: Blood pressure 144/86, pulse 95, temperature 98 5 °F (36 9 °C), temperature source Oral, resp  rate 17, height 5' 9" (1 753 m), weight 103 kg (226 lb 6 4 oz), SpO2 95 %, not currently breastfeeding  Orthostatic Blood Pressures    Flowsheet Row Most Recent Value   Blood Pressure 144/86 filed at 01/30/2023 0754          No intake or output data in the 24 hours ending 01/30/23 0855    Invasive Devices     Peripheral Intravenous Line  Duration           Peripheral IV 01/30/23 Dorsal (posterior); Left Wrist <1 day    Peripheral IV 01/30/23 Right Antecubital <1 day                Physical Exam  GEN: NAD, alert and oriented x 3, well appearing  SKIN: dry without significant lesions or rashes  HEENT: NCAT, PERRL, EOMs intact  NECK: No JVD appreciated  CARDIOVASCULAR: RRR, normal S1, S2 without murmurs, rubs, or gallops appreciated  LUNGS: Clear to auscultation bilaterally without wheezes, rhonchi, or rales  ABDOMEN: Soft, nontender, nondistended  EXTREMITIES/VASCULAR: perfused without clubbing, cyanosis, or LE edema b/l  PSYCH: Normal mood and affect  NEURO: CN ll-Xll grossly intact      Lab Results: I have personally reviewed pertinent lab results      Results from last 7 days   Lab Units 01/30/23  0728   WBC Thousand/uL 8 78   HEMOGLOBIN g/dL Render Note In Bullet Format When Appropriate: No 14 4   HEMATOCRIT % 43 6   PLATELETS Thousands/uL 250     Results from last 7 days   Lab Units 01/30/23  0728   POTASSIUM mmol/L 3 8   CHLORIDE mmol/L 108   CO2 mmol/L 26   BUN mg/dL 8   CREATININE mg/dL 0 72   CALCIUM mg/dL 8 9     Results from last 7 days   Lab Units 01/30/23  0728   INR  0 95             Imaging: I have personally reviewed pertinent reports  No results found for this or any previous visit        EKG: pending        Code Status: Level 1 - Full Code Anesthesia Volume In Cc: 0.5 Detail Level: Detailed Treatment Number (Will Not Render If 0): 0 Medical Necessity Information: It is in your best interest to select a reason for this procedure from the list below. All of these items fulfill various CMS LCD requirements except the new and changing color options. Post-Care Instructions: I reviewed with the patient in detail post-care instructions. Patient is to wear sunprotection, and avoid picking at any of the treated lesions. Pt may apply Vaseline to crusted or scabbing areas. Medical Necessity Clause: This procedure was medically necessary because the lesions that were treated were: Consent: The patient's consent was obtained including but not limited to risks of crusting, scabbing, blistering, scarring, darker or lighter pigmentary change, recurrence, incomplete removal and infection.

## 2023-01-30 NOTE — ANESTHESIA PREPROCEDURE EVALUATION
Procedure:  Cardiac eps/svt ablation (Chest)    Relevant Problems   CARDIO   (+) Hypertension   (+) SVT (supraventricular tachycardia) (HCC)      NEURO/PSYCH   (+) Anxiety   (+) Depression      Recent labs personally reviewed:  Lab Results   Component Value Date    WBC 8 78 01/30/2023    HGB 14 4 01/30/2023     01/30/2023     Lab Results   Component Value Date    K 3 8 01/30/2023    BUN 8 01/30/2023    CREATININE 0 72 01/30/2023     No results found for: PTT   Lab Results   Component Value Date    INR 0 95 01/30/2023       Lab Results   Component Value Date    HGBA1C 5 2 04/29/2022           Physical Exam    Airway    Mallampati score: II  TM Distance: >3 FB  Neck ROM: full     Dental   No notable dental hx     Cardiovascular      Pulmonary  Pulmonary exam normal     Other Findings        Anesthesia Plan  ASA Score- 2     Anesthesia Type- IV sedation with anesthesia with ASA Monitors  Additional Monitors:   Airway Plan:     Comment: Supplemental O2, etco2 monitoring  Patient very anxious          Plan Factors-Exercise tolerance (METS): >4 METS  Chart reviewed  Patient summary reviewed  Patient is not a current smoker  Induction- intravenous  Postoperative Plan-     Informed Consent- Anesthetic plan and risks discussed with patient  I personally reviewed this patient with the CRNA  Discussed and agreed on the Anesthesia Plan with the CRNA  Sruthi New

## 2023-01-30 NOTE — DISCHARGE INSTR - AVS FIRST PAGE
PLEASE NOTE THE FOLLOWING MEDICATION RECOMMENDATIONS:  - discontinue metoprolol  - continue lisinopril as previously instructed  - take baby aspirin 81 mg daily for 30 days, at which time it can be discontinued        RESTRICTIONS:  No heavy lifting or strenuous activity for one week  No soaking in a bath tub/hot tub/swimming pool for one week or until groin heals  You may shower - please let soap and water run over the groins, no scrubbing, and pat the area dry  Please place band-aid on groins daily for up to five days, but you may remove sooner if no issues are noted  If you notice ongoing bleeding, swelling, or firm lumps in groin near ablation incision, please contact Dr Carlyle Jules office - [phone]

## 2023-01-30 NOTE — Clinical Note
Site (pad location): Mid Lower back and left flank  Grounding pad site assessment: skin integrity intact

## 2023-01-30 NOTE — ANESTHESIA POSTPROCEDURE EVALUATION
Post-Op Assessment Note    CV Status:  Stable  Pain Score: 0    Pain management: adequate     Mental Status:  Alert and awake   Hydration Status:  Euvolemic and stable   PONV Controlled:  Controlled   Airway Patency:  Patent and adequate      Post Op Vitals Reviewed: Yes      Staff: CRNA         No notable events documented      BP      Temp      Pulse     Resp      SpO2

## 2023-01-30 NOTE — NURSING NOTE
Patient s/p SVT Ablation with Dr Otto   Alert and Orientated x3  Patient has no complaints, tolerated procedure well  Site assessed with receiving RN  No bleeding, no hematoma  Site dry and intact  Patient transferred to bed, resting comfortably

## 2023-02-01 ENCOUNTER — TELEPHONE (OUTPATIENT)
Dept: RADIOLOGY | Facility: HOSPITAL | Age: 33
End: 2023-02-01

## 2023-02-01 RX ORDER — SODIUM CHLORIDE 9 MG/ML
75 INJECTION, SOLUTION INTRAVENOUS CONTINUOUS
Status: CANCELLED | OUTPATIENT
Start: 2023-02-01

## 2023-02-01 NOTE — PRE-PROCEDURE INSTRUCTIONS
Pre-procedure Instructions for Interventional Radiology  [address]  [place] [address] [phone]    You are scheduled for a/an Random Liver Biopsy  On Thursday 2/9/23  Your tentative arrival time is 0900  Short stay will notify you the day before your procedure with the exact arrival time and the location to arrive  To prepare for your procedure:  1  Please arrange for someone to drive you home after the procedure and stay with you until the next morning if you are instructed to do so  This is typically for patients receiving some type of sedative or anesthetic for the procedure  2  DO NOT EAT OR DRINK ANYTHING after midnight on the evening before your procedure including candy & gum   3  ONLY SIPS OF WATER with your medications are allowed on the morning of your procedure  4  TAKE ALL OF YOUR REGULAR MEDICATIONS THE MORNING OF YOUR PROCEDURE with sips of water! We may call you to stop some of your blood sugar, blood pressure and blood thinning medications depending on the procedure  Please take all of these medications unless we instruct you to stop them  5  If you have an allergy to x-ray dye, please contact Interventional Radiology for an x-ray dye preparation which usually consists of an oral steroid and Benadryl  The day of your procedure:  1  Bring a list of the medications you take at home  2  Bring medications you take for breathing problems (such as inhalers), medications for chest pain, or both  3  Bring a case for your glasses or contacts  4  Bring your insurance card and a form of photo ID   5  Please leave all valuables such as credit cards and jewelry at home  6  Report to the registration desk in the main lobby at the [facility], [facility]  Ask to be directed to [facility]  7  While your procedure is being performed, your family may wait in the Radiology Waiting Room on the 1st floor in Radiology  if they need to leave, they may provide a number to be called following the procedure  8  Be prepared to stay overnight just in case  Sometimes procedures will indicate the need for further observation or treatment  9  If you are scheduled for a follow-up visit with the Interventional Radiologist after your procedure, you will be called with a date and time      Special Instructions (Medications to stop taking before your procedure etc ):  ASA Ld 2/3/23 and restart 2/10/23 (ok'd by Dr Power Pastrana )

## 2023-02-09 ENCOUNTER — HOSPITAL ENCOUNTER (OUTPATIENT)
Dept: RADIOLOGY | Facility: HOSPITAL | Age: 33
Discharge: HOME | End: 2023-02-09
Attending: RADIOLOGY | Admitting: INTERNAL MEDICINE

## 2023-02-09 VITALS
RESPIRATION RATE: 16 BRPM | OXYGEN SATURATION: 96 % | BODY MASS INDEX: 33.71 KG/M2 | DIASTOLIC BLOOD PRESSURE: 76 MMHG | TEMPERATURE: 98.3 F | SYSTOLIC BLOOD PRESSURE: 124 MMHG | WEIGHT: 227.6 LBS | HEIGHT: 69 IN | HEART RATE: 81 BPM

## 2023-02-09 DIAGNOSIS — R79.89 LFT ELEVATION: ICD-10-CM

## 2023-02-09 RX ORDER — FENTANYL CITRATE 50 UG/ML
INJECTION, SOLUTION INTRAMUSCULAR; INTRAVENOUS AS NEEDED
Status: COMPLETED | OUTPATIENT
Start: 2023-02-09 | End: 2023-02-09

## 2023-02-09 RX ORDER — SODIUM CHLORIDE 9 MG/ML
75 INJECTION, SOLUTION INTRAVENOUS CONTINUOUS
Status: DISCONTINUED | OUTPATIENT
Start: 2023-02-09 | End: 2023-02-10 | Stop reason: HOSPADM

## 2023-02-09 RX ORDER — MIDAZOLAM HYDROCHLORIDE 2 MG/2ML
INJECTION, SOLUTION INTRAMUSCULAR; INTRAVENOUS AS NEEDED
Status: COMPLETED | OUTPATIENT
Start: 2023-02-09 | End: 2023-02-09

## 2023-02-09 RX ORDER — LIDOCAINE HYDROCHLORIDE 10 MG/ML
INJECTION, SOLUTION EPIDURAL; INFILTRATION; INTRACAUDAL; PERINEURAL AS NEEDED
Status: COMPLETED | OUTPATIENT
Start: 2023-02-09 | End: 2023-02-09

## 2023-02-09 RX ADMIN — FENTANYL CITRATE 50 MCG: 50 INJECTION, SOLUTION INTRAMUSCULAR; INTRAVENOUS at 11:12

## 2023-02-09 RX ADMIN — MIDAZOLAM 1 MG: 1 INJECTION INTRAMUSCULAR; INTRAVENOUS at 11:18

## 2023-02-09 RX ADMIN — FENTANYL CITRATE 50 MCG: 50 INJECTION, SOLUTION INTRAMUSCULAR; INTRAVENOUS at 11:18

## 2023-02-09 RX ADMIN — SODIUM CHLORIDE 75 ML/HR: 0.9 INJECTION, SOLUTION INTRAVENOUS at 10:06

## 2023-02-09 RX ADMIN — LIDOCAINE HYDROCHLORIDE 10 ML: 10 INJECTION, SOLUTION EPIDURAL; INFILTRATION; INTRACAUDAL; PERINEURAL at 11:18

## 2023-02-09 RX ADMIN — MIDAZOLAM 1 MG: 1 INJECTION INTRAMUSCULAR; INTRAVENOUS at 11:12

## 2023-02-09 NOTE — SEDATION DOCUMENTATION
Random liver biopsy performed by Dr Jaja Negron without complication  Patient tolerated procedure well  Specimen sent  Bandaid to RUST puncture site  IR Procedure Bedrest Start Time is 1130 x 4 hours  Report called to short stay nurse

## 2023-02-09 NOTE — BRIEF OP NOTE (RAD/CATH)
INTERVENTIONAL RADIOLOGY PROCEDURE NOTE    Date: 2/9/2023    Procedure:   Procedure Summary     Date: 02/09/23 Room / Location: [address] [place] [address] Interventional Radiology    Anesthesia Start:  Anesthesia Stop:     Procedure: IR BIOPSY LIVER RANDOM Diagnosis:       LFT elevation      (unexplained persistent elevated LFTs)    Scheduled Providers:  Responsible Provider:     Anesthesia Type: Not recorded ASA Status: Not recorded          Preoperative diagnosis:   1  LFT elevation         Postoperative diagnosis: Same  Surgeon: Venita Sarah MD     Assistant: None  No qualified resident was available  Blood loss: 1 mL    Specimens: 3 core liver biopsy specimens     Findings: Ultrasound-guided random liver core biopsy performed  Please see the radiology report for details  Complications: None immediate      Anesthesia: conscious sedation and local

## 2023-02-09 NOTE — H&P
Interventional Radiology  History and Physical 2/9/2023     Omar Greene   1990   [id number]    Assessment/Plan:  29year-old female with history of elevated transaminases, with ongoing workup  We will proceed with random liver biopsy today to assist in diagnosis of underlying cause  Problem List Items Addressed This Visit    None  Visit Diagnoses     LFT elevation        Relevant Orders    IR biopsy liver random             Subjective:     Patient ID: Omar Greene is a 29 y o  female  History of Present Illness  29year-old female with history of elevated transaminases, with ongoing workup  Patient states she is doing well today  Review of Systems   All other systems reviewed and are negative  Past Medical History:   Diagnosis Date   • Allergic    • Anxiety    • Depression    • Hypertension    • SVT (supraventricular tachycardia) ([address] )         Past Surgical History:   Procedure Laterality Date   • CARDIAC ELECTROPHYSIOLOGY PROCEDURE N/A 1/30/2023    Procedure: Cardiac eps/svt ablation;  Surgeon: Hammad John DO;  Location: [facility] CARDIAC CATH LAB;   Service: Cardiology        Social History     Tobacco Use   Smoking Status Never   Smokeless Tobacco Never        Social History     Substance and Sexual Activity   Alcohol Use Yes   • Alcohol/week: 2 0 standard drinks   • Types: 2 Glasses of wine per week    Comment: socially        Social History     Substance and Sexual Activity   Drug Use Never        No Known Allergies    Current Outpatient Medications   Medication Sig Dispense Refill   • aspirin 81 mg chewable tablet Chew 1 tablet (81 mg total) daily 30 tablet 0   • citalopram (CeleXA) 20 mg tablet Take 1 tablet (20 mg total) by mouth daily 90 tablet 0   • lisinopril (ZESTRIL) 5 mg tablet Take 1 tablet (5 mg total) by mouth daily 90 tablet 0   • montelukast (SINGULAIR) 10 mg tablet Take 1 tablet (10 mg total) by mouth daily at bedtime 90 tablet 0   • bisacodyl (DULCOLAX) 5 mg EC tablet Take 1 tablet (5 mg total) by mouth once for 1 dose 2 tablet 0   • norethindrone (Ortho Micronor) 0 35 MG tablet Take 1 tablet (0 35 mg total) by mouth daily 28 tablet 6   • polyethylene glycol (GOLYTELY) 4000 mL solution Take 4,000 mL by mouth once for 1 dose 4000 mL 0     Current Facility-Administered Medications   Medication Dose Route Frequency Provider Last Rate Last Admin   • sodium chloride 0 9 % infusion  75 mL/hr Intravenous Continuous Emilee Ding MD 75 mL/hr at 02/09/23 1006 75 mL/hr at 02/09/23 1006          Objective:    Vitals:    02/09/23 0936   BP: 115/74   Pulse: 83   Resp: 18   Temp: 98 6 °F (37 °C)   TempSrc: Oral   SpO2: 95%   Weight: 103 kg (227 lb 9 6 oz)   Height: 5' 9" (1 753 m)        Physical Exam  Constitutional:       Appearance: Normal appearance  HENT:      Head: Normocephalic and atraumatic  Nose: Nose normal    Eyes:      Extraocular Movements: Extraocular movements intact  Cardiovascular:      Rate and Rhythm: Normal rate  Pulmonary:      Effort: Pulmonary effort is normal    Neurological:      Mental Status: She is alert  No results found for: BNP   Lab Results   Component Value Date    WBC 8 78 01/30/2023    HGB 14 4 01/30/2023    HCT 43 6 01/30/2023    MCV 88 01/30/2023     01/30/2023     Lab Results   Component Value Date    INR 0 95 01/30/2023    INR 0 98 12/08/2022    PROTIME 12 8 01/30/2023    PROTIME 13 2 12/08/2022     No results found for: PTT      I have personally reviewed pertinent imaging and laboratory results  Code Status: Prior  Advance Directive and Living Will:      Power of :    POLST:      This text is generated with voice recognition software  There may be translation, syntax,  or grammatical errors  If you have any questions, please contact the dictating provider

## 2023-02-09 NOTE — DISCHARGE INSTRUCTIONS
Percutaneous Liver Biopsy   WHAT YOU NEED TO KNOW:   A PLB is a procedure to remove a sample of tissue from your liver  The sample can be sent to a lab and tested for liver disease, cancer, or infection  After the procedure you may have pain and bruising at the biopsy site  You may also have pain in your right shoulder  These symptoms should get better in 48 to 72 hours  DISCHARGE INSTRUCTIONS:     [address] [facility] patients,  Contact Interventional Radiology at [phone] PATIENTS: Contact Interventional Radiology at [phone]   [facility] PATIENTS: Contact Interventional Radiology at [phone] if:    Fever greater than 101 or chills  You have severe pain in your abdomen  Your abdomen is larger than usual and feels hard  Your neck is more swollen and you have trouble swallowing  You feel weak or dizzy  Your heart is beating faster than usual    Your pain does not get better after you take pain medicine  Your wound is red, swollen, or draining pus  You have nausea or are vomiting  Your skin is itchy, swollen, or you have a rash  You have questions or concerns about your condition or care  Medicines:   Acetaminophen decreases pain and fever  It is available without a doctor's order  Acetaminophen can cause liver damage if not taken correctly  Take your home medicine as directed  Resume your normal diet  Small sips of flat soda will help with mild nausea  Self-care:   Rest as directed  Do not play sports, exercise, or lift anything heavier than 5 pounds for up to 1 week  Apply firm, steady pressure if bleeding occurs  A small amount of bleeding from your wound is possible  Apply pressure with a clean gauze or towel for 5 to 10 minutes  Call 911 if bleeding becomes heavy or does not stop  Ask your healthcare provider when to take your blood thinner or antiplatelet medicine  You may need to wait 24 to 72 hours to take your medicine   This will prevent bleeding  Follow up with your healthcare provider as directed: Write down your questions so you remember to ask them during your visits  Moderate Sedation   WHAT YOU NEED TO KNOW:   Moderate sedation, or conscious sedation, is medicine used during procedures to help you feel relaxed and calm  You will be awake and able to follow directions without anxiety or pain  You will remember little to none of the procedure  You may feel tired, weak, or unsteady on your feet after you get sedation  You may also have trouble concentrating or short-term memory loss  These symptoms should go away in 24 hours or less  DISCHARGE INSTRUCTIONS:   Call 911 or have someone else call for any of the following: You have sudden trouble breathing  You cannot be woken  Seek care immediately if:   You have a severe headache or dizziness  Your heart is beating faster than usual   Contact your healthcare provider if:   You have a fever  You have nausea or are vomiting for more than 8 hours after the procedure  Your skin is itchy, swollen, or you have a rash  You have questions or concerns about your condition or care  Self-care:   Have someone stay with you for 24 hours  This person can drive you to errands and help you do things around the house  This person can also watch for problems  Rest and do quiet activities for 24 hours  Do not exercise, ride a bike, or play sports  Stand up slowly to prevent dizziness and falls  Take short walks around the house with another person  Slowly return to your usual activities the next day  Do not drive or use dangerous machines or tools for 24 hours  You may injure yourself or others  Examples include a lawnmower, saw, or drill  Do not return to work for 24 hours if you use dangerous machines or tools for work  Do not make important decisions for 24 hours  For example, do not sign important papers or invest money        Drink liquids as directed  Liquids help flush the sedation medicine out of your body  Ask how much liquid to drink each day and which liquids are best for you  Eat small, frequent meals to prevent nausea and vomiting  Start with clear liquids such as juice or broth  If you do not vomit after clear liquids, you can eat your usual foods  Do not drink alcohol or take medicines that make you drowsy  This includes medicines that help you sleep and anxiety medicines  Ask your healthcare provider if it is safe for you to take pain medicine  Follow up with your healthcare provider as directed: Write down your questions so you remember to ask them during your visits  © 2017 [employer] Information is for End User's use only and may not be sold, redistributed or otherwise used for commercial purposes  All illustrations and images included in CareNotes® are the copyrighted property of A D A M , Inc  or [employer]  The above information is an  only  It is not intended as medical advice for individual conditions or treatments  Talk to your doctor, nurse or pharmacist before following any medical regimen to see if it is safe and effective for you

## 2023-02-11 DIAGNOSIS — I10 HYPERTENSION, UNSPECIFIED TYPE: ICD-10-CM

## 2023-02-13 RX ORDER — LISINOPRIL 5 MG/1
5 TABLET ORAL DAILY
Qty: 30 TABLET | Refills: 0 | Status: SHIPPED | OUTPATIENT
Start: 2023-02-13 | End: 2023-05-14

## 2023-02-15 ENCOUNTER — TELEPHONE (OUTPATIENT)
Dept: GASTROENTEROLOGY | Facility: CLINIC | Age: 33
End: 2023-02-15

## 2023-03-06 ENCOUNTER — OFFICE VISIT (OUTPATIENT)
Dept: CARDIOLOGY CLINIC | Facility: CLINIC | Age: 33
End: 2023-03-06

## 2023-03-06 VITALS
HEIGHT: 69 IN | HEART RATE: 75 BPM | BODY MASS INDEX: 34.36 KG/M2 | WEIGHT: 232 LBS | SYSTOLIC BLOOD PRESSURE: 120 MMHG | DIASTOLIC BLOOD PRESSURE: 82 MMHG

## 2023-03-06 DIAGNOSIS — I47.1 SVT (SUPRAVENTRICULAR TACHYCARDIA) (HCC): Primary | ICD-10-CM

## 2023-03-06 NOTE — PROGRESS NOTES
Electrophysiology Office Note    Francesca Ends  1990  [id number]  HEART & VASCULAR [facility]Both[facility] [phone]        Assessment/Plan     Primary diagnosis:   1  SVT    * patient presents to [facility] EP office today for post AVNRT RFA by Dr Homa Brooke    * today ECG showing NSR no abnormalities    * was on aspirin 81mg for 30 days post ablation this is now discontinued    * she did have one small episode feeling as if she was going to go back into SVT this past weekend but did not have sustained palpitations  She knew she could have symptoms like this and understands as time goes on she likely will not notice these symptoms occurring  * she is not on AVNB    * recommend routine yearly follow up with EP as she does have family hx of afib  Secondary diagnosis:   1  Obesity   2  HTN - followed by Dr Boris Ken   3  RIVER    * diagnosed on recent liver biopsy    * following with GI               Rhythm History:   Atrial fibrillation:     Atrial flutter:     SVT:     VT/VF/PVC:     Device history:   Pacemaker:    Defibrillator:    BIV PPM:    BIV ICD:    ILR:      Cardiac Testing:     ECHO: No results found for this or any previous visit  History of Present Illness     HPI/INTERVAL HISTORY:  Since hospital discharge from SVT ablation patient has felt well! Did have one small episode this past weekend of feeling as if she was going to go into SVT but did not  Review of Systems  ROS as noted above, otherwise 12 point review of systems was performed and is negative         Historical Information   Social History     Socioeconomic History   • Marital status: Single     Spouse name: Not on file   • Number of children: Not on file   • Years of education: Not on file   • Highest education level: Not on file   Occupational History   • Not on file   Tobacco Use   • Smoking status: Never   • Smokeless tobacco: Never   Vaping Use   • Vaping Use: Never used Substance and Sexual Activity   • Alcohol use: Yes     Alcohol/week: 2 0 standard drinks     Types: 2 Glasses of wine per week     Comment: socially   • Drug use: Never   • Sexual activity: Not on file   Other Topics Concern   • Not on file   Social History Narrative   • Not on file     Social Determinants of Health     Financial Resource Strain: Not on file   Food Insecurity: Not on file   Transportation Needs: Not on file   Physical Activity: Not on file   Stress: Not on file   Social Connections: Not on file   Intimate Partner Violence: Not on file   Housing Stability: Not on file     Past Medical History:   Diagnosis Date   • Allergic    • Anxiety    • Depression    • Hypertension    • SVT (supraventricular tachycardia) ([facility] [address] )      Past Surgical History:   Procedure Laterality Date   • CARDIAC ELECTROPHYSIOLOGY PROCEDURE N/A 1/30/2023    Procedure: Cardiac eps/svt ablation;  Surgeon: Sai Keenan DO;  Location: [facility]ARD[facility];   Service: Cardiology   • IR BIOPSY LIVER RANDOM  2/9/2023     Social History     Substance and Sexual Activity   Alcohol Use Yes   • Alcohol/week: 2 0 standard drinks   • Types: 2 Glasses of wine per week    Comment: socially     Social History     Substance and Sexual Activity   Drug Use Never     Social History     Tobacco Use   Smoking Status Never   Smokeless Tobacco Never     Family History   Problem Relation Age of Onset   • Depression Mother    • Thyroid disease Mother    • Hypertension Mother    • Depression Father    • Depression Maternal Grandmother    • Thyroid disease Maternal Grandmother    • Hypertension Maternal Grandmother        Meds/Allergies       Current Outpatient Medications:   •  citalopram (CeleXA) 20 mg tablet, Take 1 tablet (20 mg total) by mouth daily, Disp: 90 tablet, Rfl: 0  •  lisinopril (ZESTRIL) 5 mg tablet, Take 1 tablet (5 mg total) by mouth daily, Disp: 30 tablet, Rfl: 0  •  montelukast (SINGULAIR) 10 mg tablet, Take 1 tablet (10 mg total) by mouth daily at bedtime, Disp: 90 tablet, Rfl: 0  •  bisacodyl (DULCOLAX) 5 mg EC tablet, Take 1 tablet (5 mg total) by mouth once for 1 dose, Disp: 2 tablet, Rfl: 0  •  norethindrone (Ortho Micronor) 0 35 MG tablet, Take 1 tablet (0 35 mg total) by mouth daily, Disp: 28 tablet, Rfl: 6  •  polyethylene glycol (GOLYTELY) 4000 mL solution, Take 4,000 mL by mouth once for 1 dose, Disp: 4000 mL, Rfl: 0    No Known Allergies    Objective   Vitals: Blood pressure 120/82, pulse 75, height 5' 9" (1 753 m), weight 105 kg (232 lb), not currently breastfeeding  Physical Exam  Constitutional:       Appearance: She is well-developed  HENT:      Head: Normocephalic and atraumatic  Eyes:      Pupils: Pupils are equal, round, and reactive to light  Cardiovascular:      Rate and Rhythm: Normal rate and regular rhythm  Pulmonary:      Effort: Pulmonary effort is normal       Breath sounds: Normal breath sounds  Abdominal:      General: Bowel sounds are normal       Palpations: Abdomen is soft  Musculoskeletal:         General: Normal range of motion  Cervical back: Normal range of motion and neck supple  Skin:     General: Skin is warm and dry  Neurological:      Mental Status: She is alert and oriented to person, place, and time             Labs:  Hospital Outpatient Visit on 02/09/2023   Component Date Value   • Case Report 02/09/2023                      Value:Surgical Pathology Report                         Case: [id number]                                   Authorizing Provider:  Guilherme Hardy MD           Collected:           02/09/2023 1120              Ordering Location:     [address] [place] [address]      Received:            02/09/2023 3 [facility] Pru[facility] Interventional                                                                             Radiology                                                                    Pathologist:           Sergio Johnson MD Specimen:    Liver, random                                                                             • Addendum 2 02/09/2023                      Value: This result contains rich text formatting which cannot be displayed here  • Addendum 02/09/2023                      Value: This result contains rich text formatting which cannot be displayed here  • Final Diagnosis 02/09/2023                      Value: This result contains rich text formatting which cannot be displayed here  • Microscopic Description 02/09/2023                      Value: This result contains rich text formatting which cannot be displayed here  • Additional Information 02/09/2023                      Value: This result contains rich text formatting which cannot be displayed here  • Gross Description 02/09/2023                      Value: This result contains rich text formatting which cannot be displayed here  • Clinical Information 02/09/2023                      Value:elevated LFTs, fatty liver   ,     ALINE 1:640 with negative AMA, ASMA; ceruloplasmin WNL; AP WNL 2023 with elevated AST, ALT; AAT - PiMM; Hep A,B,C serologies negative    Admission on 01/30/2023, Discharged on 01/30/2023   Component Date Value   • Sodium 01/30/2023 139    • Potassium 01/30/2023 3 8    • Chloride 01/30/2023 108    • CO2 01/30/2023 26    • ANION GAP 01/30/2023 5    • BUN 01/30/2023 8    • Creatinine 01/30/2023 0 72    • Glucose 01/30/2023 111    • Glucose, Fasting 01/30/2023 111 (H)    • Calcium 01/30/2023 8 9    • eGFR 01/30/2023 112    • Preg, Serum 01/30/2023 Negative    • WBC 01/30/2023 8 78    • RBC 01/30/2023 4 93    • Hemoglobin 01/30/2023 14 4    • Hematocrit 01/30/2023 43 6    • MCV 01/30/2023 88    • MCH 01/30/2023 29 2    • MCHC 01/30/2023 33 0    • RDW 01/30/2023 12 5    • MPV 01/30/2023 10 3    • Platelets 42/58/7066 250    • nRBC 01/30/2023 0    • Neutrophils Relative 01/30/2023 63    • Immat GRANS % 01/30/2023 1    • Lymphocytes Relative 01/30/2023 30    • Monocytes Relative 01/30/2023 4    • Eosinophils Relative 01/30/2023 1    • Basophils Relative 01/30/2023 1    • Neutrophils Absolute 01/30/2023 5 58    • Immature Grans Absolute 01/30/2023 0 05    • Lymphocytes Absolute 01/30/2023 2 62    • Monocytes Absolute 01/30/2023 0 38    • Eosinophils Absolute 01/30/2023 0 11    • Basophils Absolute 01/30/2023 0 04    • Protime 01/30/2023 12 8    • INR 01/30/2023 0 95    • Ventricular Rate 01/30/2023 89    • Atrial Rate 01/30/2023 89    • NY Interval 01/30/2023 154    • QRSD Interval 01/30/2023 108    • QT Interval 01/30/2023 362    • QTC Interval 01/30/2023 440    • P Axis 01/30/2023 40    • QRS Axis 01/30/2023 64    • T Wave Axis 01/30/2023 25    • Ventricular Rate 01/30/2023 93    • Atrial Rate 01/30/2023 93    • NY Interval 01/30/2023 184    • QRSD Interval 01/30/2023 106    • QT Interval 01/30/2023 372    • QTC Interval 01/30/2023 462    • P Axis 01/30/2023 40    • QRS Axis 01/30/2023 75    • T Wave Axis 01/30/2023 12        Imaging: I have personally reviewed pertinent reports

## 2023-03-14 ENCOUNTER — TELEPHONE (OUTPATIENT)
Dept: PSYCHIATRY | Facility: CLINIC | Age: 33
End: 2023-03-14

## 2023-03-15 DIAGNOSIS — F41.9 ANXIETY: ICD-10-CM

## 2023-03-16 RX ORDER — CITALOPRAM 20 MG/1
20 TABLET ORAL DAILY
Qty: 30 TABLET | Refills: 0 | Status: SHIPPED | OUTPATIENT
Start: 2023-03-16 | End: 2023-06-14

## 2023-03-16 NOTE — TELEPHONE ENCOUNTER
Refills have been requested for the following medications:         citalopram (CeleXA) 20 mg tablet [Blanca Xiong]      Patient Comment: Just a 30 day please     Preferred pharmacy: [address] [place] [address], [address] [place] [address] seen 8/30/22  Has appt 3/29/23  #30 sent to pharmacy

## 2023-03-17 ENCOUNTER — TELEPHONE (OUTPATIENT)
Dept: PSYCHIATRY | Facility: CLINIC | Age: 33
End: 2023-03-17

## 2023-03-23 ENCOUNTER — TELEPHONE (OUTPATIENT)
Dept: DERMATOLOGY | Facility: CLINIC | Age: 33
End: 2023-03-23

## 2023-03-23 NOTE — TELEPHONE ENCOUNTER
NP sent email request for apt, [facility] employee who can no longer be seen at [id number] [facility]  Email note:  Previous patient of [facility], need to establish care with [facility] dermatology  Found new black mole on body, previous shave biopsy in the past     Called pt, N/A, lm to cb

## 2023-04-22 DIAGNOSIS — F41.9 ANXIETY: ICD-10-CM

## 2023-04-22 DIAGNOSIS — J30.2 SEASONAL ALLERGIC RHINITIS, UNSPECIFIED TRIGGER: ICD-10-CM

## 2023-04-24 RX ORDER — CITALOPRAM 20 MG/1
20 TABLET ORAL DAILY
Qty: 30 TABLET | Refills: 0 | Status: SHIPPED | OUTPATIENT
Start: 2023-04-24 | End: 2023-04-25

## 2023-04-24 RX ORDER — MONTELUKAST SODIUM 10 MG/1
10 TABLET ORAL
Qty: 30 TABLET | Refills: 0 | Status: SHIPPED | OUTPATIENT
Start: 2023-04-24 | End: 2023-07-23

## 2023-04-25 ENCOUNTER — OFFICE VISIT (OUTPATIENT)
Dept: FAMILY MEDICINE CLINIC | Facility: CLINIC | Age: 33
End: 2023-04-25

## 2023-04-25 VITALS
HEART RATE: 79 BPM | TEMPERATURE: 97.8 F | DIASTOLIC BLOOD PRESSURE: 78 MMHG | WEIGHT: 229 LBS | SYSTOLIC BLOOD PRESSURE: 118 MMHG | BODY MASS INDEX: 33.92 KG/M2 | RESPIRATION RATE: 16 BRPM | HEIGHT: 69 IN | OXYGEN SATURATION: 98 %

## 2023-04-25 DIAGNOSIS — I10 HYPERTENSION, UNSPECIFIED TYPE: ICD-10-CM

## 2023-04-25 DIAGNOSIS — F41.9 ANXIETY: ICD-10-CM

## 2023-04-25 DIAGNOSIS — Z00.00 HEALTHCARE MAINTENANCE: Primary | ICD-10-CM

## 2023-04-25 DIAGNOSIS — E66.09 CLASS 1 OBESITY DUE TO EXCESS CALORIES WITHOUT SERIOUS COMORBIDITY WITH BODY MASS INDEX (BMI) OF 33.0 TO 33.9 IN ADULT: ICD-10-CM

## 2023-04-25 DIAGNOSIS — R25.2 LEG CRAMPS: ICD-10-CM

## 2023-04-25 DIAGNOSIS — I47.1 SVT (SUPRAVENTRICULAR TACHYCARDIA) (HCC): ICD-10-CM

## 2023-04-25 PROBLEM — E66.811 CLASS 1 OBESITY DUE TO EXCESS CALORIES WITHOUT SERIOUS COMORBIDITY WITH BODY MASS INDEX (BMI) OF 33.0 TO 33.9 IN ADULT: Status: ACTIVE | Noted: 2023-04-25

## 2023-04-25 RX ORDER — CITALOPRAM 10 MG/1
10 TABLET ORAL DAILY
Qty: 90 TABLET | Refills: 0 | Status: SHIPPED | OUTPATIENT
Start: 2023-04-25

## 2023-04-25 NOTE — ASSESSMENT & PLAN NOTE
- Prescription sent for Wegovy 0 25 mg weekly x 4 weeks  Discussed potential side effects   - Encouraged healthy diet and regular exercise   - Recommend follow up in 1 month

## 2023-04-25 NOTE — ASSESSMENT & PLAN NOTE
- Reviewed immunizations and screenings  - UTD with dental, vision, and GYN exams    - Routine labs ordered

## 2023-04-25 NOTE — ASSESSMENT & PLAN NOTE
- Improving   - She would like to try and wean her dose and eventually come off the medication  Will decrease Celexa to 10 mg daily    - Recommend follow up in 1 month

## 2023-04-25 NOTE — PROGRESS NOTES
Name: Carolina Gifford      : 1990      MRN: [id number]  Encounter Provider: MONTY Lopez  Encounter Date: 2023   Encounter department: [address] [place] [address] PRIMARY CARE    Assessment & Plan     1  Healthcare maintenance  Assessment & Plan:  - Reviewed immunizations and screenings  - UTD with dental, vision, and GYN exams    - Routine labs ordered  2  Anxiety  Assessment & Plan:  - Improving   - She would like to try and wean her dose and eventually come off the medication  Will decrease Celexa to 10 mg daily    - Recommend follow up in 1 month  Orders:  -     citalopram (CeleXA) 10 mg tablet; Take 1 tablet (10 mg total) by mouth daily    3  Leg cramps  Assessment & Plan:  - Will check CMP and magnesium    - Increase oral hydration    - Will continue to follow up  Orders:  -     Comprehensive metabolic panel; Future  -     Magnesium; Future    4  Hypertension, unspecified type  Assessment & Plan:  - Well controlled on no current medication  Continue same    - Will continue to monitor  Orders:  -     CBC and differential; Future  -     Comprehensive metabolic panel; Future  -     TSH, 3rd generation with Free T4 reflex; Future    5  Class 1 obesity due to excess calories without serious comorbidity with body mass index (BMI) of 33 0 to 33 9 in adult  Assessment & Plan:  - Prescription sent for Wegovy 0 25 mg weekly x 4 weeks  Discussed potential side effects   - Encouraged healthy diet and regular exercise   - Recommend follow up in 1 month  Orders:  -     Semaglutide-Weight Management (WEGOVY) 0 25 MG/0 5ML; Inject 0 5 mL (0 25 mg total) under the skin once a week for 4 doses    6  SVT (supraventricular tachycardia) (HCC)  Assessment & Plan:  - Underwent SVT ablation on     - Follow up with Cardiology as needed  Subjective     Patient with PMH of anxiety, fatty liver, and SVT presents today for follow up   She is taking her prescribed medication and reports no side effects  She is interested in cutting down on her Celexa  She feels her anxiety is much better and she would like to see if she could eventually come off the medication  She does have a history of persistenlty elevated LFTs for which she underwent liver biopsy significant for steatohepatitis  Was recommended she lose weight  She also underwent SVT ablation on 1/30  She was cleared from Cardiology standpoint and is to follow up with them as needed  She has complaints today of difficulty losing weight  She exercises 3-4 times per week for 30-60 minutes  Also walks every day during lunch  She is watching her diet and eating more protein and vegetables  States she doesn't snack or eat junk food  Is still having difficulty losing weight  Also complains of intermittent leg cramps  Review of Systems   Constitutional: Negative for fatigue and fever  HENT: Negative for congestion, rhinorrhea and trouble swallowing  Eyes: Negative for visual disturbance  Respiratory: Negative for cough and shortness of breath  Cardiovascular: Negative for chest pain and palpitations  Gastrointestinal: Negative for abdominal pain and blood in stool  Endocrine: Negative for cold intolerance and heat intolerance  Genitourinary: Negative for difficulty urinating, dysuria and frequency  Musculoskeletal: Negative for gait problem  Leg cramps   Skin: Negative for rash  Neurological: Negative for dizziness, syncope and headaches  Hematological: Negative for adenopathy  Psychiatric/Behavioral: Negative for behavioral problems  Past Medical History:   Diagnosis Date   • Allergic    • Anxiety    • Depression    • Hypertension    • SVT (supraventricular tachycardia) ([facility] [address] )      Past Surgical History:   Procedure Laterality Date   • CARDIAC ELECTROPHYSIOLOGY PROCEDURE N/A 1/30/2023    Procedure: Cardiac eps/svt ablation;  Surgeon: Madeline Gandara DO;  Location: [facility];   Service: Cardiology   • IR BIOPSY LIVER RANDOM  2/9/2023     Family History   Problem Relation Age of Onset   • Depression Mother    • Thyroid disease Mother    • Hypertension Mother    • Depression Father    • Depression Maternal Grandmother    • Thyroid disease Maternal Grandmother    • Hypertension Maternal Grandmother      Social History     Socioeconomic History   • Marital status: Single     Spouse name: None   • Number of children: None   • Years of education: None   • Highest education level: None   Occupational History   • None   Tobacco Use   • Smoking status: Never   • Smokeless tobacco: Never   Vaping Use   • Vaping Use: Never used   Substance and Sexual Activity   • Alcohol use:  Yes     Alcohol/week: 2 0 standard drinks     Types: 2 Glasses of wine per week     Comment: socially   • Drug use: Never   • Sexual activity: None   Other Topics Concern   • None   Social History Narrative   • None     Social Determinants of Health     Financial Resource Strain: Not on file   Food Insecurity: Not on file   Transportation Needs: Not on file   Physical Activity: Not on file   Stress: Not on file   Social Connections: Not on file   Intimate Partner Violence: Not on file   Housing Stability: Not on file     Current Outpatient Medications on File Prior to Visit   Medication Sig   • montelukast (SINGULAIR) 10 mg tablet Take 1 tablet (10 mg total) by mouth daily at bedtime   • norethindrone (Ortho Micronor) 0 35 MG tablet Take 1 tablet (0 35 mg total) by mouth daily   • [DISCONTINUED] citalopram (CeleXA) 20 mg tablet Take 1 tablet (20 mg total) by mouth daily   • bisacodyl (DULCOLAX) 5 mg EC tablet Take 1 tablet (5 mg total) by mouth once for 1 dose (Patient taking differently: Take 5 mg by mouth once Hasnt started )   • polyethylene glycol (GOLYTELY) 4000 mL solution Take 4,000 mL by mouth once for 1 dose (Patient taking differently: Take by mouth once Has not started )   • [DISCONTINUED] fluticasone (FLONASE) 50 mcg/act nasal spray 1 spray "into each nostril daily (Patient not taking: Reported on 4/17/2023)     No Known Allergies  Immunization History   Administered Date(s) Administered   • COVID-19 PFIZER VACCINE 0 3 ML IM 06/01/2021, 06/22/2021   • COVID-19 Pfizer vac (Surya-sucrose, gray cap) 12 yr+ IM 01/14/2022   • DTaP 5 1992, 1992, 1992, 1992, 1992   • HPV Quadrivalent 04/21/2008, 07/02/2008   • HPV9 02/21/2020   • Hep B, adult 1992, 1992, 06/22/1993   • Hib (PRP-OMP) 1992, 1992, 1992, 09/09/1993   • INFLUENZA 10/04/2016, 10/03/2017, 10/25/2021   • IPV 1992, 1992, 09/09/1993, 01/20/1998   • Influenza Quadrivalent Preservative Free 3 years and older IM 10/22/2018, 10/23/2019, 10/23/2020   • MMR 06/22/1993, 01/20/1998   • Meningococcal MCV4P 04/21/2008   • Meningococcal, Unknown Serogroups 04/21/2008   • Td (adult), adsorbed 1992   • Tdap 02/27/2017   • Varicella Zoster Immune Globulin 01/07/2000       Objective     /78 (BP Location: Left arm, Patient Position: Sitting, Cuff Size: Large)   Pulse 79   Temp 97 8 °F (36 6 °C) (Tympanic)   Resp 16   Ht 5' 9\" (1 753 m)   Wt 104 kg (229 lb)   SpO2 98%   BMI 33 82 kg/m²     Physical Exam  Vitals and nursing note reviewed  Constitutional:       General: She is not in acute distress  Appearance: Normal appearance  She is not ill-appearing  HENT:      Head: Normocephalic and atraumatic  Right Ear: Tympanic membrane, ear canal and external ear normal       Left Ear: Tympanic membrane, ear canal and external ear normal       Nose: Nose normal       Mouth/Throat:      Mouth: Mucous membranes are moist    Eyes:      Extraocular Movements: Extraocular movements intact  Conjunctiva/sclera: Conjunctivae normal       Pupils: Pupils are equal, round, and reactive to light  Cardiovascular:      Rate and Rhythm: Normal rate and regular rhythm  Heart sounds: Normal heart sounds     Pulmonary:      Effort: " Pulmonary effort is normal       Breath sounds: Normal breath sounds  Abdominal:      General: Bowel sounds are normal       Palpations: Abdomen is soft  Musculoskeletal:         General: Normal range of motion  Cervical back: Normal range of motion  Lymphadenopathy:      Cervical: No cervical adenopathy  Skin:     General: Skin is warm and dry  Neurological:      Mental Status: She is alert and oriented to person, place, and time  Cranial Nerves: No cranial nerve deficit     Psychiatric:         Mood and Affect: Mood normal          Behavior: Behavior normal        MONTY Ding

## 2023-04-26 ENCOUNTER — APPOINTMENT (OUTPATIENT)
Dept: LAB | Facility: CLINIC | Age: 33
End: 2023-04-26

## 2023-04-26 ENCOUNTER — TELEMEDICINE (OUTPATIENT)
Dept: GASTROENTEROLOGY | Facility: CLINIC | Age: 33
End: 2023-04-26

## 2023-04-26 ENCOUNTER — TELEPHONE (OUTPATIENT)
Dept: FAMILY MEDICINE CLINIC | Facility: CLINIC | Age: 33
End: 2023-04-26

## 2023-04-26 VITALS
HEIGHT: 60 IN | TEMPERATURE: 97.8 F | SYSTOLIC BLOOD PRESSURE: 118 MMHG | BODY MASS INDEX: 44.96 KG/M2 | DIASTOLIC BLOOD PRESSURE: 78 MMHG | WEIGHT: 229 LBS

## 2023-04-26 DIAGNOSIS — R68.89 HEAT INTOLERANCE: ICD-10-CM

## 2023-04-26 DIAGNOSIS — K56.609 SMALL BOWEL OBSTRUCTION (HCC): ICD-10-CM

## 2023-04-26 DIAGNOSIS — I10 HYPERTENSION, UNSPECIFIED TYPE: ICD-10-CM

## 2023-04-26 DIAGNOSIS — R61 NIGHT SWEATS: ICD-10-CM

## 2023-04-26 DIAGNOSIS — K76.0 FATTY LIVER: ICD-10-CM

## 2023-04-26 DIAGNOSIS — R23.2 FLUSHING: ICD-10-CM

## 2023-04-26 DIAGNOSIS — R63.5 WEIGHT GAIN: ICD-10-CM

## 2023-04-26 DIAGNOSIS — R25.2 LEG CRAMPS: ICD-10-CM

## 2023-04-26 DIAGNOSIS — R74.01 ELEVATION OF LEVELS OF LIVER TRANSAMINASE LEVELS: Primary | ICD-10-CM

## 2023-04-26 DIAGNOSIS — K59.09 OTHER CONSTIPATION: ICD-10-CM

## 2023-04-26 LAB
ALBUMIN SERPL BCP-MCNC: 3.9 G/DL (ref 3.5–5)
ALP SERPL-CCNC: 73 U/L (ref 46–116)
ALT SERPL W P-5'-P-CCNC: 125 U/L (ref 12–78)
ANION GAP SERPL CALCULATED.3IONS-SCNC: 9 MMOL/L (ref 4–13)
AST SERPL W P-5'-P-CCNC: 78 U/L (ref 5–45)
BASOPHILS # BLD AUTO: 0.02 THOUSANDS/ΜL (ref 0–0.1)
BASOPHILS NFR BLD AUTO: 0 % (ref 0–1)
BILIRUB SERPL-MCNC: 0.58 MG/DL (ref 0.2–1)
BUN SERPL-MCNC: 8 MG/DL (ref 5–25)
CALCIUM SERPL-MCNC: 9.7 MG/DL (ref 8.3–10.1)
CHLORIDE SERPL-SCNC: 104 MMOL/L (ref 96–108)
CO2 SERPL-SCNC: 22 MMOL/L (ref 21–32)
CREAT SERPL-MCNC: 0.71 MG/DL (ref 0.6–1.3)
EOSINOPHIL # BLD AUTO: 0.05 THOUSAND/ΜL (ref 0–0.61)
EOSINOPHIL NFR BLD AUTO: 1 % (ref 0–6)
ERYTHROCYTE [DISTWIDTH] IN BLOOD BY AUTOMATED COUNT: 12.9 % (ref 11.6–15.1)
GFR SERPL CREATININE-BSD FRML MDRD: 113 ML/MIN/1.73SQ M
GLUCOSE P FAST SERPL-MCNC: 84 MG/DL (ref 65–99)
HCT VFR BLD AUTO: 43.3 % (ref 34.8–46.1)
HGB BLD-MCNC: 14 G/DL (ref 11.5–15.4)
IMM GRANULOCYTES # BLD AUTO: 0.05 THOUSAND/UL (ref 0–0.2)
IMM GRANULOCYTES NFR BLD AUTO: 1 % (ref 0–2)
LYMPHOCYTES # BLD AUTO: 2.78 THOUSANDS/ΜL (ref 0.6–4.47)
LYMPHOCYTES NFR BLD AUTO: 27 % (ref 14–44)
MAGNESIUM SERPL-MCNC: 2.4 MG/DL (ref 1.6–2.6)
MCH RBC QN AUTO: 28.5 PG (ref 26.8–34.3)
MCHC RBC AUTO-ENTMCNC: 32.3 G/DL (ref 31.4–37.4)
MCV RBC AUTO: 88 FL (ref 82–98)
MONOCYTES # BLD AUTO: 0.41 THOUSAND/ΜL (ref 0.17–1.22)
MONOCYTES NFR BLD AUTO: 4 % (ref 4–12)
NEUTROPHILS # BLD AUTO: 6.99 THOUSANDS/ΜL (ref 1.85–7.62)
NEUTS SEG NFR BLD AUTO: 67 % (ref 43–75)
NRBC BLD AUTO-RTO: 0 /100 WBCS
PLATELET # BLD AUTO: 287 THOUSANDS/UL (ref 149–390)
PMV BLD AUTO: 10.1 FL (ref 8.9–12.7)
POTASSIUM SERPL-SCNC: 3.9 MMOL/L (ref 3.5–5.3)
PROT SERPL-MCNC: 8.1 G/DL (ref 6.4–8.4)
RBC # BLD AUTO: 4.92 MILLION/UL (ref 3.81–5.12)
SODIUM SERPL-SCNC: 135 MMOL/L (ref 135–147)
TSH SERPL DL<=0.05 MIU/L-ACNC: 1.62 UIU/ML (ref 0.45–4.5)
WBC # BLD AUTO: 10.3 THOUSAND/UL (ref 4.31–10.16)

## 2023-04-26 NOTE — H&P (VIEW-ONLY)
Virtual Regular Visit    Verification of patient location:    Patient is located in the following state in which I hold an active license [place]      Assessment/Plan:    Problem List Items Addressed This Visit    None  Visit Diagnoses     Elevation of levels of liver transaminase levels    -  Primary    Relevant Orders    Comprehensive metabolic panel    Fatty liver        Relevant Orders    Comprehensive metabolic panel        After last visit with me, Ms Lawson Fritz had repeat LFTs which showed persistent elevation of her transaminases and as such we proceeded with a liver biopsy  Liver biopsy confirmed steatohepatitis as etiology without other competing causes  She met with our weight management team and was told that they would not prescribe weight loss medications  She did meet with her PCP who prescribed [facility] and she is in the process of filling the prescription  She also had her ablation [AVNRT] done and is now able to exercise  She states she occasionally has palpitations but symptomatically is much better  Unfortunately despite some changes to her lifestyle she has not been able to lose weight  Ms Lawson Fritz denies recent or history of yellow eyes/skin, dark urine, GI bleeding, abdominal distention with fluid, lower extremity swelling, easy bruising, excessive bleeding, pruritus or confusion  She continues to struggle with constipation and is currently taking Colace as needed  She has not tried the Dulcolax that was prescribed in the past     We discussed management of her nonalcoholic steatohepatitis including ongoing efforts for weight loss  She is excited to start the [facility] and continue her efforts of increasing physical activity and improving her diet  She has a colonoscopy coming up this Friday and would like to start [facility] shortly thereafter  We will continue to serially check her LFTs, every 12 weeks    I hope that her transaminases which have remained elevated for several months [most recent AST 80, ] we will start to decrease with weight loss  She will follow-up with me in 6 months time  Reason for visit is   Chief Complaint   Patient presents with   • Follow-up     Patient had labs and liver biopsy        Encounter provider Paola Candelario MD    Provider located at [address]  [phone]      Recent Visits  Date Type Provider Dept   04/25/23 Office Visit Lorenzocarson Braga, [address] Primary Care   Showing recent visits within past 7 days and meeting all other requirements  Today's Visits  Date Type Provider Dept   04/26/23 Telemedicine Paola Candelario MD [facility] today's visits and meeting all other requirements  Future Appointments  No visits were found meeting these conditions  Showing future appointments within next 150 days and meeting all other requirements       The patient was identified by name and date of birth  Lyric Naylor was informed that this is a telemedicine visit and that the visit is being conducted through Telephone  My office door was closed  No one else was in the room  She acknowledged consent and understanding of privacy and security of the video platform  The patient has agreed to participate and understands they can discontinue the visit at any time  We were unable to connect via video and ultimately connected via telephone call  Patient is aware this is a billable service  Subjective  Lyric Naylor is a 34 y o  female with elevated LFTs with ALT 4 times upper limit of normal and fatty appearing liver on imaging in the setting of abdominal obesity and hypertension who follows up today via virtual visit after last being seen in December 2022        HPI     Past Medical History:   Diagnosis Date   • Allergic    • Anxiety    • Depression    • Hypertension    • SVT (supraventricular tachycardia) [facility])        Past Surgical History:   Procedure Laterality Date   • CARDIAC ELECTROPHYSIOLOGY PROCEDURE N/A 1/30/2023    Procedure: Cardiac eps/svt ablation;  Surgeon: Deisy Degroot DO;  [facility]: [facility]; Service: Cardiology   • IR BIOPSY LIVER RANDOM  2/9/2023       Current Outpatient Medications   Medication Sig Dispense Refill   • citalopram (CeleXA) 10 mg tablet Take 1 tablet (10 mg total) by mouth daily 90 tablet 0   • montelukast (SINGULAIR) 10 mg tablet Take 1 tablet (10 mg total) by mouth daily at bedtime 30 tablet 0   • norethindrone (Ortho Micronor) 0 35 MG tablet Take 1 tablet (0 35 mg total) by mouth daily 28 tablet 6   • bisacodyl (DULCOLAX) 5 mg EC tablet Take 1 tablet (5 mg total) by mouth once for 1 dose (Patient taking differently: Take 5 mg by mouth once Hasnt started ) 2 tablet 0   • polyethylene glycol (GOLYTELY) 4000 mL solution Take 4,000 mL by mouth once for 1 dose (Patient taking differently: Take by mouth once Has not started ) 4000 mL 0   • Semaglutide-Weight Management (WEGOVY) 0 25 MG/0 5ML Inject 0 5 mL (0 25 mg total) under the skin once a week for 4 doses (Patient not taking: Reported on 4/26/2023) 2 mL 0     No current facility-administered medications for this visit  No Known Allergies    Review of Systems   All other systems reviewed and are negative  Video Exam    Vitals:    04/26/23 1316   BP: 118/78   Temp: 97 8 °F (36 6 °C)   TempSrc: Tympanic   Weight: 104 kg (229 lb)   Height: 5' (1 524 m)       Physical Exam     I spent 15 minutes with patient today in which greater than 50% of the time was spent in counseling/coordination of care regarding weight management and fatty liver disease

## 2023-04-26 NOTE — PATIENT INSTRUCTIONS
Blood work every 12 weeks  Ongoing efforts for weight loss (diet and exercise and initiation of Wegovy)

## 2023-04-26 NOTE — TELEPHONE ENCOUNTER
----- Message from Tyron Lake sent at 4/25/2023  3:27 PM EDT -----  Regarding: Prior-Auth  Contact: [phone]  Hi,  I was just wondering if and once the office receives the prior auth for the [facility] TASHA, will I be notified? For some reason when my prescriptions are filled, I'm never notified that they are ready for     Thanks,  [employer]

## 2023-04-26 NOTE — PROGRESS NOTES
Virtual Regular Visit    Verification of patient location:    Patient is located in the following state in which I hold an active license [place]      Assessment/Plan:    Problem List Items Addressed This Visit    None  Visit Diagnoses     Elevation of levels of liver transaminase levels    -  Primary    Relevant Orders    Comprehensive metabolic panel    Fatty liver        Relevant Orders    Comprehensive metabolic panel        After last visit with me, Ms Papito Mojica had repeat LFTs which showed persistent elevation of her transaminases and as such we proceeded with a liver biopsy  Liver biopsy confirmed steatohepatitis as etiology without other competing causes  She met with our weight management team and was told that they would not prescribe weight loss medications  She did meet with her PCP who prescribed [facility] Megha and she is in the process of filling the prescription  She also had her ablation [AVNRT] done and is now able to exercise  She states she occasionally has palpitations but symptomatically is much better  Unfortunately despite some changes to her lifestyle she has not been able to lose weight  Ms Papito Mojica denies recent or history of yellow eyes/skin, dark urine, GI bleeding, abdominal distention with fluid, lower extremity swelling, easy bruising, excessive bleeding, pruritus or confusion  She continues to struggle with constipation and is currently taking Colace as needed  She has not tried the Dulcolax that was prescribed in the past     We discussed management of her nonalcoholic steatohepatitis including ongoing efforts for weight loss  She is excited to start the [facility] Megha and continue her efforts of increasing physical activity and improving her diet  She has a colonoscopy coming up this Friday and would like to start [facility] Meg[facility] shortly thereafter  We will continue to serially check her LFTs, every 12 weeks    I hope that her transaminases which have remained elevated for several months [most recent AST 80, ] we will start to decrease with weight loss  She will follow-up with me in 6 months time  Reason for visit is   Chief Complaint   Patient presents with   • Follow-up     Patient had labs and liver biopsy        Encounter provider Audrey Ashby MD    Provider located at [address]  [phone]      Recent Visits  Date Type Provider Dept   04/25/23 Office Visit Opal Ponce, [address] Primary Care   Showing recent visits within past 7 days and meeting all other requirements  Today's Visits  Date Type Provider Dept   04/26/23 Telemedicine Audrey Ashby MD [facility] today's visits and meeting all other requirements  Future Appointments  No visits were found meeting these conditions  Showing future appointments within next 150 days and meeting all other requirements       The patient was identified by name and date of birth  Roberto Taveras was informed that this is a telemedicine visit and that the visit is being conducted through Telephone  My office door was closed  No one else was in the room  She acknowledged consent and understanding of privacy and security of the video platform  The patient has agreed to participate and understands they can discontinue the visit at any time  We were unable to connect via video and ultimately connected via telephone call  Patient is aware this is a billable service  Subjective  Roberto Taveras is a 34 y o  female with elevated LFTs with ALT 4 times upper limit of normal and fatty appearing liver on imaging in the setting of abdominal obesity and hypertension who follows up today via virtual visit after last being seen in December 2022        HPI     Past Medical History:   Diagnosis Date   • Allergic    • Anxiety    • Depression    • Hypertension    • SVT (supraventricular tachycardia) [facility])        Past Surgical History:   Procedure Laterality Date   • CARDIAC ELECTROPHYSIOLOGY PROCEDURE N/A 1/30/2023    Procedure: Cardiac eps/svt ablation;  Surgeon: Madeline Gandara DO;  Location: [facility]; Service: Cardiology   • IR BIOPSY LIVER RANDOM  2/9/2023       Current Outpatient Medications   Medication Sig Dispense Refill   • citalopram (CeleXA) 10 mg tablet Take 1 tablet (10 mg total) by mouth daily 90 tablet 0   • montelukast (SINGULAIR) 10 mg tablet Take 1 tablet (10 mg total) by mouth daily at bedtime 30 tablet 0   • norethindrone (Ortho Micronor) 0 35 MG tablet Take 1 tablet (0 35 mg total) by mouth daily 28 tablet 6   • bisacodyl (DULCOLAX) 5 mg EC tablet Take 1 tablet (5 mg total) by mouth once for 1 dose (Patient taking differently: Take 5 mg by mouth once Hasnt started ) 2 tablet 0   • polyethylene glycol (GOLYTELY) 4000 mL solution Take 4,000 mL by mouth once for 1 dose (Patient taking differently: Take by mouth once Has not started ) 4000 mL 0   • Semaglutide-Weight Management (WEGOVY) 0 25 MG/0 5ML Inject 0 5 mL (0 25 mg total) under the skin once a week for 4 doses (Patient not taking: Reported on 4/26/2023) 2 mL 0     No current facility-administered medications for this visit  No Known Allergies    Review of Systems   All other systems reviewed and are negative  Video Exam    Vitals:    04/26/23 1316   BP: 118/78   Temp: 97 8 °F (36 6 °C)   TempSrc: Tympanic   Weight: 104 kg (229 lb)   Height: 5' (1 524 m)       Physical Exam     I spent 15 minutes with patient today in which greater than 50% of the time was spent in counseling/coordination of care regarding weight management and fatty liver disease

## 2023-04-28 ENCOUNTER — ANESTHESIA EVENT (OUTPATIENT)
Dept: GASTROENTEROLOGY | Facility: HOSPITAL | Age: 33
End: 2023-04-28

## 2023-04-28 ENCOUNTER — HOSPITAL ENCOUNTER (OUTPATIENT)
Dept: GASTROENTEROLOGY | Facility: HOSPITAL | Age: 33
Setting detail: OUTPATIENT SURGERY
Discharge: HOME | End: 2023-04-28
Attending: INTERNAL MEDICINE | Admitting: INTERNAL MEDICINE

## 2023-04-28 ENCOUNTER — ANESTHESIA (OUTPATIENT)
Dept: GASTROENTEROLOGY | Facility: HOSPITAL | Age: 33
End: 2023-04-28

## 2023-04-28 VITALS
DIASTOLIC BLOOD PRESSURE: 59 MMHG | TEMPERATURE: 98.7 F | HEART RATE: 78 BPM | SYSTOLIC BLOOD PRESSURE: 105 MMHG | OXYGEN SATURATION: 96 % | RESPIRATION RATE: 18 BRPM

## 2023-04-28 DIAGNOSIS — K59.09 OTHER CONSTIPATION: ICD-10-CM

## 2023-04-28 DIAGNOSIS — K56.609 SMALL BOWEL OBSTRUCTION (HCC): ICD-10-CM

## 2023-04-28 LAB
EXT PREGNANCY TEST URINE: NEGATIVE
EXT. CONTROL: NORMAL

## 2023-04-28 RX ORDER — PROPOFOL 10 MG/ML
INJECTION, EMULSION INTRAVENOUS AS NEEDED
Status: DISCONTINUED | OUTPATIENT
Start: 2023-04-28 | End: 2023-04-28

## 2023-04-28 RX ORDER — SODIUM CHLORIDE, SODIUM LACTATE, POTASSIUM CHLORIDE, CALCIUM CHLORIDE 600; 310; 30; 20 MG/100ML; MG/100ML; MG/100ML; MG/100ML
INJECTION, SOLUTION INTRAVENOUS CONTINUOUS PRN
Status: DISCONTINUED | OUTPATIENT
Start: 2023-04-28 | End: 2023-04-28

## 2023-04-28 RX ADMIN — SODIUM CHLORIDE, SODIUM LACTATE, POTASSIUM CHLORIDE, AND CALCIUM CHLORIDE: .6; .31; .03; .02 INJECTION, SOLUTION INTRAVENOUS at 13:03

## 2023-04-28 RX ADMIN — PROPOFOL 100 MG: 10 INJECTION, EMULSION INTRAVENOUS at 13:10

## 2023-04-28 RX ADMIN — PROPOFOL 100 MG: 10 INJECTION, EMULSION INTRAVENOUS at 13:16

## 2023-04-28 RX ADMIN — PROPOFOL 200 MG: 10 INJECTION, EMULSION INTRAVENOUS at 13:08

## 2023-04-28 RX ADMIN — PROPOFOL 100 MG: 10 INJECTION, EMULSION INTRAVENOUS at 13:12

## 2023-04-28 RX ADMIN — PROPOFOL 100 MG: 10 INJECTION, EMULSION INTRAVENOUS at 13:20

## 2023-04-28 NOTE — INTERVAL H&P NOTE
H&P reviewed  After examining the patient I find no changes in the patients condition since the H&P had been written      Vitals:    04/28/23 1245   BP: 143/97   Pulse: 90   Resp: 20   Temp: 99 8 °F (37 7 °C)   SpO2: 97%

## 2023-04-28 NOTE — ANESTHESIA PREPROCEDURE EVALUATION
Procedure:  COLONOSCOPY    Relevant Problems   ANESTHESIA (within normal limits)      CARDIO   (+) Hypertension   (+) SVT (supraventricular tachycardia) (HCC)      ENDO   (-) Diabetes mellitus, type 2 (HCC)   (-) Hyperthyroidism   (-) Hypothyroidism      GI/HEPATIC   (-) Gastroesophageal reflux disease      /RENAL (within normal limits)      GYN (within normal limits)      HEMATOLOGY (within normal limits)      MUSCULOSKELETAL (within normal limits)      NEURO/PSYCH   (+) Anxiety   (+) Depression      PULMONARY   (-) Asthma   (-) Sleep apnea        TTE 6/27/2022  •  Left Ventricle: Left ventricular cavity size is normal  Wall thickness is at the upper limit of normal  There is no concentric hypertrophy  The left ventricular ejection fraction is 60%  Systolic function is normal  Wall motion is normal  Diastolic function is normal   •  Right Ventricle: Right ventricular cavity size is normal  Systolic function is normal       Physical Exam    Airway    Mallampati score: II  TM Distance: >3 FB  Neck ROM: full     Dental   No notable dental hx     Cardiovascular      Pulmonary      Other Findings        Anesthesia Plan  ASA Score- 2     Anesthesia Type- IV sedation with anesthesia with ASA Monitors  Additional Monitors:   Airway Plan:           Plan Factors-Exercise tolerance (METS): >4 METS  Chart reviewed  EKG reviewed  Existing labs reviewed  Patient summary reviewed  Patient is not a current smoker  Induction- intravenous  Postoperative Plan-     Informed Consent- Anesthetic plan and risks discussed with patient  I personally reviewed this patient with the CRNA  Discussed and agreed on the Anesthesia Plan with the CRNA  Josue De La Fuente

## 2023-05-02 ENCOUNTER — TELEPHONE (OUTPATIENT)
Dept: FAMILY MEDICINE CLINIC | Facility: CLINIC | Age: 33
End: 2023-05-02

## 2023-05-02 NOTE — TELEPHONE ENCOUNTER
----- Message from Dena Peck, [address] sent at 4/28/2023  1:12 PM EDT -----  Labs all stable except for elevated liver enzymes which is chronic issue   Recommend continued follow up with GI

## 2023-05-04 ENCOUNTER — TELEPHONE (OUTPATIENT)
Dept: FAMILY MEDICINE CLINIC | Facility: CLINIC | Age: 33
End: 2023-05-04

## 2023-05-04 NOTE — TELEPHONE ENCOUNTER
----- Message from Sal Rubin, [address] sent at 4/28/2023  1:12 PM EDT -----  Labs all stable except for elevated liver enzymes which is chronic issue   Recommend continued follow up with GI

## 2023-05-23 ENCOUNTER — OFFICE VISIT (OUTPATIENT)
Dept: FAMILY MEDICINE CLINIC | Facility: CLINIC | Age: 33
End: 2023-05-23

## 2023-05-23 VITALS
OXYGEN SATURATION: 96 % | BODY MASS INDEX: 32.88 KG/M2 | HEIGHT: 69 IN | RESPIRATION RATE: 16 BRPM | HEART RATE: 83 BPM | SYSTOLIC BLOOD PRESSURE: 114 MMHG | TEMPERATURE: 98.1 F | DIASTOLIC BLOOD PRESSURE: 70 MMHG | WEIGHT: 222 LBS

## 2023-05-23 DIAGNOSIS — I10 HYPERTENSION, UNSPECIFIED TYPE: ICD-10-CM

## 2023-05-23 DIAGNOSIS — F41.9 ANXIETY: Primary | ICD-10-CM

## 2023-05-23 DIAGNOSIS — E66.09 CLASS 1 OBESITY DUE TO EXCESS CALORIES WITHOUT SERIOUS COMORBIDITY WITH BODY MASS INDEX (BMI) OF 32.0 TO 32.9 IN ADULT: ICD-10-CM

## 2023-05-23 DIAGNOSIS — J30.2 SEASONAL ALLERGIC RHINITIS, UNSPECIFIED TRIGGER: ICD-10-CM

## 2023-05-23 PROBLEM — E66.811 CLASS 1 OBESITY DUE TO EXCESS CALORIES WITHOUT SERIOUS COMORBIDITY WITH BODY MASS INDEX (BMI) OF 32.0 TO 32.9 IN ADULT: Status: ACTIVE | Noted: 2023-05-23

## 2023-05-23 RX ORDER — CITALOPRAM 20 MG/1
20 TABLET ORAL DAILY
Qty: 90 TABLET | Refills: 1 | Status: SHIPPED | OUTPATIENT
Start: 2023-05-23

## 2023-05-23 RX ORDER — MONTELUKAST SODIUM 10 MG/1
10 TABLET ORAL
Qty: 90 TABLET | Refills: 1 | Status: SHIPPED | OUTPATIENT
Start: 2023-05-23 | End: 2023-08-21

## 2023-05-23 NOTE — ASSESSMENT & PLAN NOTE
- Well controlled on no current antihypertensive medications  Continue same    - Will continue to monitor

## 2023-05-23 NOTE — PROGRESS NOTES
Name: Shahnaz Stone      : 1990      MRN: [id number]  Encounter Provider: MONTY Stahl  Encounter Date: 2023   Encounter department: [facility]  PRIMARY CARE    Assessment & Plan     1  Anxiety  Assessment & Plan:  - Not well controlled  Did not tolerate decreasing Celexa  Will increase back to 20 mg daily  - Will continue to monitor  Orders:  -     citalopram (CeleXA) 20 mg tablet; Take 1 tablet (20 mg total) by mouth daily    2  Seasonal allergic rhinitis, unspecified trigger  Assessment & Plan:  - Stable on Singulair  Continue same    - Will continue to monitor  Orders:  -     montelukast (SINGULAIR) 10 mg tablet; Take 1 tablet (10 mg total) by mouth daily at bedtime    3  Class 1 obesity due to excess calories without serious comorbidity with body mass index (BMI) of 32 0 to 32 9 in adult  Assessment & Plan:  - Down 6 pounds since last visit  Will increase Wegovy to 0 5 mg weekly x 4 weeks  - Encourage healthy diet and regular exercise   - Recommend follow up in 1 month  Orders:  -     Semaglutide-Weight Management (WEGOVY) 0 5 MG/0 5ML; Inject 0 5 mL (0 5 mg total) under the skin once a week for 4 doses    4  Hypertension, unspecified type  Assessment & Plan:  - Well controlled on no current antihypertensive medications  Continue same    - Will continue to monitor  Subjective     Patient presents today for 1 month follow up for weight management  She is currently taking Wegovy 0 25 mg weekly  Denies any significant side effects  She is down 6 pounds since her last visit  We decreased her Celexa last visit because she was trying to see if she could come off the medication  Since then she finds herself more anxious and on edge  Would like to go back to her old dose  She had her labs done which were all stable except for elevated liver enzymes for which she follows with GI  Review of Systems   Constitutional: Negative for fatigue and fever     HENT: Negative for trouble swallowing  Eyes: Negative for visual disturbance  Respiratory: Negative for cough and shortness of breath  Cardiovascular: Negative for chest pain and palpitations  Gastrointestinal: Negative for abdominal pain and blood in stool  Endocrine: Negative for cold intolerance and heat intolerance  Genitourinary: Negative for difficulty urinating and dysuria  Musculoskeletal: Negative for gait problem  Skin: Negative for rash  Neurological: Negative for dizziness, syncope and headaches  Hematological: Negative for adenopathy  Psychiatric/Behavioral: Negative for behavioral problems  The patient is nervous/anxious  Past Medical History:   Diagnosis Date   • Allergic    • Anxiety    • Depression    • Hypertension    • SVT (supraventricular tachycardia) ([address] )      Past Surgical History:   Procedure Laterality Date   • CARDIAC ELECTROPHYSIOLOGY PROCEDURE N/A 1/30/2023    Procedure: Cardiac eps/svt ablation;  Surgeon: Vincent Whittaker DO;  Location: [facility]; Service: Cardiology   • IR BIOPSY LIVER RANDOM  2/9/2023     Family History   Problem Relation Age of Onset   • Depression Mother    • Thyroid disease Mother    • Hypertension Mother    • Depression Father    • Depression Maternal Grandmother    • Thyroid disease Maternal Grandmother    • Hypertension Maternal Grandmother      Social History     Socioeconomic History   • Marital status: Single     Spouse name: None   • Number of children: None   • Years of education: None   • Highest education level: None   Occupational History   • None   Tobacco Use   • Smoking status: Never   • Smokeless tobacco: Never   Vaping Use   • Vaping Use: Never used   Substance and Sexual Activity   • Alcohol use:  Yes     Alcohol/week: 2 0 standard drinks     Types: 2 Glasses of wine per week     Comment: socially   • Drug use: Never   • Sexual activity: None   Other Topics Concern   • None   Social History Narrative   • None Social Determinants of Health     Financial Resource Strain: Not on file   Food Insecurity: Not on file   Transportation Needs: Not on file   Physical Activity: Not on file   Stress: Not on file   Social Connections: Not on file   Intimate Partner Violence: Not on file   Housing Stability: Not on file     Current Outpatient Medications on File Prior to Visit   Medication Sig   • norethindrone (Ortho Micronor) 0 35 MG tablet Take 1 tablet (0 35 mg total) by mouth daily   • [DISCONTINUED] citalopram (CeleXA) 10 mg tablet Take 1 tablet (10 mg total) by mouth daily   • [DISCONTINUED] montelukast (SINGULAIR) 10 mg tablet Take 1 tablet (10 mg total) by mouth daily at bedtime   • [DISCONTINUED] bisacodyl (DULCOLAX) 5 mg EC tablet Take 1 tablet (5 mg total) by mouth once for 1 dose (Patient taking differently: Take 5 mg by mouth once Hasnt started )   • [DISCONTINUED] polyethylene glycol (GOLYTELY) 4000 mL solution Take 4,000 mL by mouth once for 1 dose (Patient taking differently: Take by mouth once Has not started )     No Known Allergies  Immunization History   Administered Date(s) Administered   • COVID-19 PFIZER VACCINE 0 3 ML IM 06/01/2021, 06/22/2021   • COVID-19 Pfizer vac (Surya-sucrose, gray cap) 12 yr+ IM 01/14/2022   • DTaP 5 1992, 1992, 1992, 1992, 1992   • HPV Quadrivalent 04/21/2008, 07/02/2008   • HPV9 02/21/2020   • Hep B, adult 1992, 1992, 06/22/1993   • Hib (PRP-OMP) 1992, 1992, 1992, 09/09/1993   • INFLUENZA 10/04/2016, 10/03/2017, 10/25/2021   • IPV 1992, 1992, 09/09/1993, 01/20/1998   • Influenza Quadrivalent Preservative Free 3 years and older IM 10/22/2018, 10/23/2019, 10/23/2020   • MMR 06/22/1993, 01/20/1998   • Meningococcal MCV4P 04/21/2008   • Meningococcal, Unknown Serogroups 04/21/2008   • Td (adult), adsorbed 1992   • Tdap 02/27/2017   • Varicella Zoster Immune Globulin 01/07/2000       Objective     /70 "(BP Location: Left arm, Patient Position: Sitting, Cuff Size: Large)   Pulse 83   Temp 98 1 °F (36 7 °C) (Tympanic)   Resp 16   Ht 5' 9\" (1 753 m)   Wt 101 kg (222 lb)   SpO2 96%   BMI 32 78 kg/m²     Physical Exam  Vitals and nursing note reviewed  Constitutional:       General: She is not in acute distress  Appearance: Normal appearance  She is not ill-appearing  HENT:      Head: Normocephalic and atraumatic  Right Ear: External ear normal       Left Ear: External ear normal    Eyes:      Conjunctiva/sclera: Conjunctivae normal    Cardiovascular:      Rate and Rhythm: Normal rate and regular rhythm  Heart sounds: Normal heart sounds  Pulmonary:      Effort: Pulmonary effort is normal       Breath sounds: Normal breath sounds  Musculoskeletal:         General: Normal range of motion  Cervical back: Normal range of motion  Skin:     General: Skin is warm and dry  Neurological:      Mental Status: She is alert and oriented to person, place, and time  Cranial Nerves: No cranial nerve deficit     Psychiatric:         Mood and Affect: Mood normal          Behavior: Behavior normal        MONTY Jay  "

## 2023-05-23 NOTE — ASSESSMENT & PLAN NOTE
- Down 6 pounds since last visit  Will increase Wegovy to 0 5 mg weekly x 4 weeks  - Encourage healthy diet and regular exercise   - Recommend follow up in 1 month

## 2023-05-23 NOTE — ASSESSMENT & PLAN NOTE
- Not well controlled  Did not tolerate decreasing Celexa  Will increase back to 20 mg daily  - Will continue to monitor

## 2023-06-02 ENCOUNTER — TELEPHONE (OUTPATIENT)
Dept: FAMILY MEDICINE CLINIC | Facility: CLINIC | Age: 33
End: 2023-06-02

## 2023-06-02 DIAGNOSIS — E66.09 CLASS 1 OBESITY DUE TO EXCESS CALORIES WITHOUT SERIOUS COMORBIDITY WITH BODY MASS INDEX (BMI) OF 32.0 TO 32.9 IN ADULT: ICD-10-CM

## 2023-06-02 NOTE — TELEPHONE ENCOUNTER
----- Message from Yumi Asencio sent at 6/2/2023 10:15 AM EDT -----  Regarding: [facility] Back order  Contact: [phone]  Okay, yes please  Would they still accept my insurance however? And the coupon I had received? I have the [facility]

## 2023-06-24 PROBLEM — Z00.00 HEALTHCARE MAINTENANCE: Status: RESOLVED | Noted: 2023-04-25 | Resolved: 2023-06-24

## 2023-06-26 DIAGNOSIS — R11.0 NAUSEA: Primary | ICD-10-CM

## 2023-06-26 DIAGNOSIS — E66.09 CLASS 1 OBESITY DUE TO EXCESS CALORIES WITHOUT SERIOUS COMORBIDITY WITH BODY MASS INDEX (BMI) OF 32.0 TO 32.9 IN ADULT: ICD-10-CM

## 2023-06-26 RX ORDER — PROCHLORPERAZINE MALEATE 10 MG
10 TABLET ORAL EVERY 6 HOURS PRN
Qty: 30 TABLET | Refills: 3 | Status: SHIPPED | OUTPATIENT
Start: 2023-06-26

## 2023-06-26 RX ORDER — SEMAGLUTIDE 0.5 MG/.5ML
INJECTION, SOLUTION SUBCUTANEOUS
Qty: 2 ML | Refills: 0 | OUTPATIENT
Start: 2023-06-26

## 2023-06-26 NOTE — PROGRESS NOTES
Compazine 10 mg q 6 send to patient's pharmacy for nausea  Home pregnancy test was negative  Avoided zofran due to problems with chronic constipation

## 2023-06-26 NOTE — TELEPHONE ENCOUNTER
I didn't approve the wegovy 0 5 mg refill  Because she now needs to go to next dose at [facility]HA 1 mg which was sent to pharmacy

## 2023-06-27 ENCOUNTER — APPOINTMENT (OUTPATIENT)
Dept: LAB | Facility: HOSPITAL | Age: 33
End: 2023-06-27
Attending: INTERNAL MEDICINE
Payer: COMMERCIAL

## 2023-06-27 DIAGNOSIS — N39.0 URINARY TRACT INFECTION WITHOUT HEMATURIA, SITE UNSPECIFIED: Primary | ICD-10-CM

## 2023-06-27 LAB
BILIRUB UR QL STRIP: NEGATIVE
CLARITY UR: CLEAR
COLOR UR: COLORLESS
GLUCOSE UR STRIP-MCNC: NEGATIVE MG/DL
HGB UR QL STRIP.AUTO: NEGATIVE
KETONES UR STRIP-MCNC: NEGATIVE MG/DL
LEUKOCYTE ESTERASE UR QL STRIP: NEGATIVE
NITRITE UR QL STRIP: NEGATIVE
PH UR STRIP.AUTO: 5 [PH]
PROT UR STRIP-MCNC: NEGATIVE MG/DL
SP GR UR STRIP.AUTO: 1.01 (ref 1–1.03)
UROBILINOGEN UR STRIP-ACNC: <2 MG/DL

## 2023-06-27 PROCEDURE — 81003 URINALYSIS AUTO W/O SCOPE: CPT | Performed by: INTERNAL MEDICINE

## 2023-06-27 NOTE — PROGRESS NOTES
Patient having dysuria, urinary frequency and urgency  Urine cultures sent  cipro 500 mg BID x 3 days sent to patient's pharmacy empirically  I will call if there are any resistant bacteria on culture

## 2023-06-30 ENCOUNTER — TELEPHONE (OUTPATIENT)
Dept: FAMILY MEDICINE CLINIC | Facility: CLINIC | Age: 33
End: 2023-06-30

## 2023-06-30 DIAGNOSIS — E66.09 CLASS 1 OBESITY DUE TO EXCESS CALORIES WITHOUT SERIOUS COMORBIDITY WITH BODY MASS INDEX (BMI) OF 32.0 TO 32.9 IN ADULT: Primary | ICD-10-CM

## 2023-06-30 NOTE — TELEPHONE ENCOUNTER
I did talk to Dr Cruz Fairly and we can repeat last dose or we can increase it to the [facility] TASHA 1 7 mg   Pt would like to repeat same dose which was sent to [place] pharmacy

## 2023-06-30 NOTE — TELEPHONE ENCOUNTER
Hi, my name is Fran Sams  YOB: 1990  I was actually calling in regards to my prescription with [facility]   I did have a refill sent into the [facility] and however they are still on back order of this and I already did call the home store pharmacy here in [place] and they do not have the 1 milligram either  Just calling to see if there are any other pharmacies for this  You can give me a call back  My number is [phone]  Thank you  You received a voice mail from [facility]

## 2023-07-18 ENCOUNTER — APPOINTMENT (OUTPATIENT)
Dept: LAB | Facility: CLINIC | Age: 33
End: 2023-07-18
Payer: COMMERCIAL

## 2023-07-20 DIAGNOSIS — E66.09 CLASS 1 OBESITY DUE TO EXCESS CALORIES WITHOUT SERIOUS COMORBIDITY WITH BODY MASS INDEX (BMI) OF 32.0 TO 32.9 IN ADULT: ICD-10-CM

## 2023-07-21 RX ORDER — SEMAGLUTIDE 1 MG/.5ML
INJECTION, SOLUTION SUBCUTANEOUS
Qty: 2 ML | Refills: 0 | OUTPATIENT
Start: 2023-07-21

## 2023-07-21 NOTE — TELEPHONE ENCOUNTER
Pharmacy requesting refill on Wegovy 1mg. Pt is due for next dose of 1.7mg. I sent Wegovy 1.7mg to the pharmacy per [facility]. Pt last seen 5/23/23  No show on 7/14/23  Sent msg to pt to make follow up appt.

## 2023-07-28 ENCOUNTER — TELEPHONE (OUTPATIENT)
Dept: PLASTIC SURGERY | Facility: CLINIC | Age: 33
End: 2023-07-28

## 2023-07-28 NOTE — TELEPHONE ENCOUNTER
Reviewed criteria for panniculectomy consultation and emailed criteria. Will let me know when complete.

## 2023-08-14 DIAGNOSIS — E66.09 CLASS 1 OBESITY DUE TO EXCESS CALORIES WITHOUT SERIOUS COMORBIDITY WITH BODY MASS INDEX (BMI) OF 32.0 TO 32.9 IN ADULT: ICD-10-CM

## 2023-08-15 NOTE — TELEPHONE ENCOUNTER
pharmacy requesting refill on Wegovy 1.7mg  Last seen 5/23/23  Canceled 2 appt in June and no showed last 2 appts  in July and august  LM for pt to call office to make follow up appt. Also sent another msg on [employer]. Please advise on medication refill.
no

## 2023-08-16 RX ORDER — SEMAGLUTIDE 1.7 MG/.75ML
INJECTION, SOLUTION SUBCUTANEOUS
Qty: 3 ML | Refills: 0 | Status: SHIPPED | OUTPATIENT
Start: 2023-08-16 | End: 2023-08-24

## 2023-08-22 ENCOUNTER — TELEPHONE (OUTPATIENT)
Dept: FAMILY MEDICINE CLINIC | Facility: CLINIC | Age: 33
End: 2023-08-22

## 2023-08-22 DIAGNOSIS — E66.09 CLASS 1 OBESITY DUE TO EXCESS CALORIES WITHOUT SERIOUS COMORBIDITY WITH BODY MASS INDEX (BMI) OF 32.0 TO 32.9 IN ADULT: ICD-10-CM

## 2023-08-22 NOTE — TELEPHONE ENCOUNTER
I called pt and her last dose was 1 mg. She has been off of it for few weeks. She would like to know if she should be on the 0.5 or 1 mg?   We sent in refill for 1.7 mg and since she has been off of it for few weeks she does not feel comfortable going up to the 1.7 mg.  Please advise

## 2023-08-22 NOTE — TELEPHONE ENCOUNTER
----- Message from Jody Keenan sent at 8/22/2023  1:20 PM EDT -----  Regarding: MUNA  Contact: [phone]  Hi,  I had received a message from Qian Xiaoâ€™er that the [place] has been filled, however, between [facility], the last dosage was not available, therefore I was not taking any. I received a message yesterday that I had a prescription ready for , however since I have not used the shot in a few weeks, I don't believe starting at the higher dose is recommended. Please advise what I should do. Thank you.   Kayleigh

## 2023-08-23 ENCOUNTER — HOSPITAL ENCOUNTER (OUTPATIENT)
Facility: HOSPITAL | Age: 33
Setting detail: OBSERVATION
Discharge: HOME | End: 2023-08-24
Attending: EMERGENCY MEDICINE | Admitting: INTERNAL MEDICINE
Payer: COMMERCIAL

## 2023-08-23 ENCOUNTER — APPOINTMENT (EMERGENCY)
Dept: RADIOLOGY | Facility: HOSPITAL | Age: 33
End: 2023-08-23
Payer: COMMERCIAL

## 2023-08-23 DIAGNOSIS — R10.9 ABDOMINAL PAIN: Primary | ICD-10-CM

## 2023-08-23 LAB
ALBUMIN SERPL BCP-MCNC: 4.6 G/DL (ref 3.5–5)
ALP SERPL-CCNC: 73 U/L (ref 34–104)
ALT SERPL W P-5'-P-CCNC: 108 U/L (ref 7–52)
ANION GAP SERPL CALCULATED.3IONS-SCNC: 11 MMOL/L
AST SERPL W P-5'-P-CCNC: 58 U/L (ref 13–39)
ATRIAL RATE: 100 BPM
BACTERIA UR QL AUTO: ABNORMAL /HPF
BASOPHILS # BLD AUTO: 0.03 THOUSANDS/ÂΜL (ref 0–0.1)
BASOPHILS NFR BLD AUTO: 0 % (ref 0–1)
BILIRUB SERPL-MCNC: 0.9 MG/DL (ref 0.2–1)
BILIRUB UR QL STRIP: NEGATIVE
BUN SERPL-MCNC: 10 MG/DL (ref 5–25)
CALCIUM SERPL-MCNC: 9.3 MG/DL (ref 8.4–10.2)
CARDIAC TROPONIN I PNL SERPL HS: 4 NG/L
CHLORIDE SERPL-SCNC: 104 MMOL/L (ref 96–108)
CLARITY UR: CLEAR
CO2 SERPL-SCNC: 23 MMOL/L (ref 21–32)
COLOR UR: YELLOW
CREAT SERPL-MCNC: 0.59 MG/DL (ref 0.6–1.3)
EOSINOPHIL # BLD AUTO: 0.01 THOUSAND/ÂΜL (ref 0–0.61)
EOSINOPHIL NFR BLD AUTO: 0 % (ref 0–6)
ERYTHROCYTE [DISTWIDTH] IN BLOOD BY AUTOMATED COUNT: 13 % (ref 11.6–15.1)
EXT PREGNANCY TEST URINE: NEGATIVE
EXT. CONTROL: NORMAL
GFR SERPL CREATININE-BSD FRML MDRD: 122 ML/MIN/1.73SQ M
GLUCOSE SERPL-MCNC: 91 MG/DL (ref 65–140)
GLUCOSE UR STRIP-MCNC: NEGATIVE MG/DL
HCT VFR BLD AUTO: 45.2 % (ref 34.8–46.1)
HGB BLD-MCNC: 15.3 G/DL (ref 11.5–15.4)
HGB UR QL STRIP.AUTO: NEGATIVE
IMM GRANULOCYTES # BLD AUTO: 0.06 THOUSAND/UL (ref 0–0.2)
IMM GRANULOCYTES NFR BLD AUTO: 0 % (ref 0–2)
KETONES UR STRIP-MCNC: ABNORMAL MG/DL
LEUKOCYTE ESTERASE UR QL STRIP: ABNORMAL
LIPASE SERPL-CCNC: 33 U/L (ref 11–82)
LYMPHOCYTES # BLD AUTO: 2.44 THOUSANDS/ÂΜL (ref 0.6–4.47)
LYMPHOCYTES NFR BLD AUTO: 15 % (ref 14–44)
MCH RBC QN AUTO: 29.8 PG (ref 26.8–34.3)
MCHC RBC AUTO-ENTMCNC: 33.8 G/DL (ref 31.4–37.4)
MCV RBC AUTO: 88 FL (ref 82–98)
MONOCYTES # BLD AUTO: 0.46 THOUSAND/ÂΜL (ref 0.17–1.22)
MONOCYTES NFR BLD AUTO: 3 % (ref 4–12)
MUCOUS THREADS UR QL AUTO: ABNORMAL
NEUTROPHILS # BLD AUTO: 12.89 THOUSANDS/ÂΜL (ref 1.85–7.62)
NEUTS SEG NFR BLD AUTO: 82 % (ref 43–75)
NITRITE UR QL STRIP: NEGATIVE
NON-SQ EPI CELLS URNS QL MICRO: ABNORMAL /HPF
NRBC BLD AUTO-RTO: 0 /100 WBCS
P AXIS: 58 DEGREES
PH UR STRIP.AUTO: 7.5 [PH]
PLATELET # BLD AUTO: 299 THOUSANDS/UL (ref 149–390)
PMV BLD AUTO: 10.2 FL (ref 8.9–12.7)
POTASSIUM SERPL-SCNC: 3.8 MMOL/L (ref 3.5–5.3)
PR INTERVAL: 156 MS
PROT SERPL-MCNC: 8.1 G/DL (ref 6.4–8.4)
PROT UR STRIP-MCNC: ABNORMAL MG/DL
QRS AXIS: 91 DEGREES
QRSD INTERVAL: 110 MS
QT INTERVAL: 370 MS
QTC INTERVAL: 477 MS
RBC # BLD AUTO: 5.14 MILLION/UL (ref 3.81–5.12)
RBC #/AREA URNS AUTO: ABNORMAL /HPF
SODIUM SERPL-SCNC: 138 MMOL/L (ref 135–147)
SP GR UR STRIP.AUTO: 1.03 (ref 1–1.03)
T WAVE AXIS: 43 DEGREES
UROBILINOGEN UR STRIP-ACNC: <2 MG/DL
VENTRICULAR RATE: 100 BPM
WBC # BLD AUTO: 15.89 THOUSAND/UL (ref 4.31–10.16)
WBC #/AREA URNS AUTO: ABNORMAL /HPF

## 2023-08-23 PROCEDURE — 80053 COMPREHEN METABOLIC PANEL: CPT | Performed by: EMERGENCY MEDICINE

## 2023-08-23 PROCEDURE — 93010 ELECTROCARDIOGRAM REPORT: CPT | Performed by: INTERNAL MEDICINE

## 2023-08-23 PROCEDURE — 96374 THER/PROPH/DIAG INJ IV PUSH: CPT

## 2023-08-23 PROCEDURE — 96375 TX/PRO/DX INJ NEW DRUG ADDON: CPT

## 2023-08-23 PROCEDURE — 83690 ASSAY OF LIPASE: CPT | Performed by: EMERGENCY MEDICINE

## 2023-08-23 PROCEDURE — 93005 ELECTROCARDIOGRAM TRACING: CPT

## 2023-08-23 PROCEDURE — 96361 HYDRATE IV INFUSION ADD-ON: CPT

## 2023-08-23 PROCEDURE — 85025 COMPLETE CBC W/AUTO DIFF WBC: CPT | Performed by: EMERGENCY MEDICINE

## 2023-08-23 PROCEDURE — 96360 HYDRATION IV INFUSION INIT: CPT

## 2023-08-23 PROCEDURE — G1004 CDSM NDSC: HCPCS

## 2023-08-23 PROCEDURE — 81001 URINALYSIS AUTO W/SCOPE: CPT | Performed by: EMERGENCY MEDICINE

## 2023-08-23 PROCEDURE — 84484 ASSAY OF TROPONIN QUANT: CPT | Performed by: EMERGENCY MEDICINE

## 2023-08-23 PROCEDURE — 74177 CT ABD & PELVIS W/CONTRAST: CPT

## 2023-08-23 PROCEDURE — 99285 EMERGENCY DEPT VISIT HI MDM: CPT | Performed by: EMERGENCY MEDICINE

## 2023-08-23 PROCEDURE — 99285 EMERGENCY DEPT VISIT HI MDM: CPT

## 2023-08-23 PROCEDURE — 81025 URINE PREGNANCY TEST: CPT | Performed by: EMERGENCY MEDICINE

## 2023-08-23 PROCEDURE — 36415 COLL VENOUS BLD VENIPUNCTURE: CPT

## 2023-08-23 RX ORDER — KETOROLAC TROMETHAMINE 30 MG/ML
15 INJECTION, SOLUTION INTRAMUSCULAR; INTRAVENOUS ONCE
Status: COMPLETED | OUTPATIENT
Start: 2023-08-23 | End: 2023-08-23

## 2023-08-23 RX ORDER — ONDANSETRON 2 MG/ML
4 INJECTION INTRAMUSCULAR; INTRAVENOUS ONCE
Status: COMPLETED | OUTPATIENT
Start: 2023-08-23 | End: 2023-08-23

## 2023-08-23 RX ORDER — HYDROMORPHONE HCL/PF 1 MG/ML
0.5 SYRINGE (ML) INJECTION ONCE
Status: COMPLETED | OUTPATIENT
Start: 2023-08-23 | End: 2023-08-24

## 2023-08-23 RX ADMIN — KETOROLAC TROMETHAMINE 15 MG: 30 INJECTION, SOLUTION INTRAMUSCULAR; INTRAVENOUS at 21:50

## 2023-08-23 RX ADMIN — ONDANSETRON 4 MG: 2 INJECTION INTRAMUSCULAR; INTRAVENOUS at 21:47

## 2023-08-23 RX ADMIN — IOHEXOL 100 ML: 350 INJECTION, SOLUTION INTRAVENOUS at 22:12

## 2023-08-23 RX ADMIN — SODIUM CHLORIDE 1000 ML: 0.9 INJECTION, SOLUTION INTRAVENOUS at 21:51

## 2023-08-24 VITALS
TEMPERATURE: 98.8 F | SYSTOLIC BLOOD PRESSURE: 128 MMHG | RESPIRATION RATE: 20 BRPM | HEART RATE: 88 BPM | OXYGEN SATURATION: 92 % | DIASTOLIC BLOOD PRESSURE: 89 MMHG

## 2023-08-24 PROBLEM — K75.81 NASH (NONALCOHOLIC STEATOHEPATITIS): Status: ACTIVE | Noted: 2023-08-24

## 2023-08-24 PROBLEM — R10.9 ABDOMINAL PAIN WITH VOMITING: Status: ACTIVE | Noted: 2023-08-24

## 2023-08-24 PROBLEM — R11.10 ABDOMINAL PAIN WITH VOMITING: Status: ACTIVE | Noted: 2023-08-24

## 2023-08-24 LAB
ANION GAP SERPL CALCULATED.3IONS-SCNC: 7 MMOL/L
BUN SERPL-MCNC: 11 MG/DL (ref 5–25)
CALCIUM SERPL-MCNC: 8.7 MG/DL (ref 8.4–10.2)
CHLORIDE SERPL-SCNC: 107 MMOL/L (ref 96–108)
CO2 SERPL-SCNC: 27 MMOL/L (ref 21–32)
CREAT SERPL-MCNC: 0.56 MG/DL (ref 0.6–1.3)
ERYTHROCYTE [DISTWIDTH] IN BLOOD BY AUTOMATED COUNT: 13.1 % (ref 11.6–15.1)
GFR SERPL CREATININE-BSD FRML MDRD: 124 ML/MIN/1.73SQ M
GLUCOSE SERPL-MCNC: 80 MG/DL (ref 65–140)
HCT VFR BLD AUTO: 41.7 % (ref 34.8–46.1)
HGB BLD-MCNC: 13.3 G/DL (ref 11.5–15.4)
MCH RBC QN AUTO: 29.4 PG (ref 26.8–34.3)
MCHC RBC AUTO-ENTMCNC: 31.9 G/DL (ref 31.4–37.4)
MCV RBC AUTO: 92 FL (ref 82–98)
PLATELET # BLD AUTO: 226 THOUSANDS/UL (ref 149–390)
PMV BLD AUTO: 10.3 FL (ref 8.9–12.7)
POTASSIUM SERPL-SCNC: 3.7 MMOL/L (ref 3.5–5.3)
RBC # BLD AUTO: 4.52 MILLION/UL (ref 3.81–5.12)
SODIUM SERPL-SCNC: 141 MMOL/L (ref 135–147)
WBC # BLD AUTO: 10.39 THOUSAND/UL (ref 4.31–10.16)

## 2023-08-24 PROCEDURE — 85027 COMPLETE CBC AUTOMATED: CPT

## 2023-08-24 PROCEDURE — 80048 BASIC METABOLIC PNL TOTAL CA: CPT

## 2023-08-24 PROCEDURE — 99236 HOSP IP/OBS SAME DATE HI 85: CPT | Performed by: INTERNAL MEDICINE

## 2023-08-24 PROCEDURE — NC001 PR NO CHARGE: Performed by: INTERNAL MEDICINE

## 2023-08-24 PROCEDURE — 36415 COLL VENOUS BLD VENIPUNCTURE: CPT

## 2023-08-24 RX ORDER — SODIUM CHLORIDE, SODIUM GLUCONATE, SODIUM ACETATE, POTASSIUM CHLORIDE, MAGNESIUM CHLORIDE, SODIUM PHOSPHATE, DIBASIC, AND POTASSIUM PHOSPHATE .53; .5; .37; .037; .03; .012; .00082 G/100ML; G/100ML; G/100ML; G/100ML; G/100ML; G/100ML; G/100ML
125 INJECTION, SOLUTION INTRAVENOUS CONTINUOUS
Status: DISCONTINUED | OUTPATIENT
Start: 2023-08-24 | End: 2023-08-24 | Stop reason: HOSPADM

## 2023-08-24 RX ORDER — ACETAMINOPHEN 325 MG/1
975 TABLET ORAL EVERY 6 HOURS PRN
Status: DISCONTINUED | OUTPATIENT
Start: 2023-08-24 | End: 2023-08-24 | Stop reason: HOSPADM

## 2023-08-24 RX ORDER — MONTELUKAST SODIUM 10 MG/1
10 TABLET ORAL
Status: DISCONTINUED | OUTPATIENT
Start: 2023-08-24 | End: 2023-08-24 | Stop reason: HOSPADM

## 2023-08-24 RX ORDER — ONDANSETRON 4 MG/1
4 TABLET, FILM COATED ORAL EVERY 8 HOURS PRN
Qty: 20 TABLET | Refills: 0 | Status: SHIPPED | OUTPATIENT
Start: 2023-08-24 | End: 2023-09-03

## 2023-08-24 RX ORDER — ONDANSETRON 2 MG/ML
4 INJECTION INTRAMUSCULAR; INTRAVENOUS EVERY 8 HOURS PRN
Status: DISCONTINUED | OUTPATIENT
Start: 2023-08-24 | End: 2023-08-24 | Stop reason: HOSPADM

## 2023-08-24 RX ORDER — FAMOTIDINE 20 MG/1
20 TABLET, FILM COATED ORAL 2 TIMES DAILY
Qty: 24 TABLET | Refills: 0 | Status: SHIPPED | OUTPATIENT
Start: 2023-08-24 | End: 2023-09-05

## 2023-08-24 RX ORDER — CITALOPRAM 20 MG/1
20 TABLET ORAL DAILY
Status: DISCONTINUED | OUTPATIENT
Start: 2023-08-24 | End: 2023-08-24 | Stop reason: HOSPADM

## 2023-08-24 RX ORDER — FAMOTIDINE 20 MG/1
20 TABLET, FILM COATED ORAL 2 TIMES DAILY
Status: DISCONTINUED | OUTPATIENT
Start: 2023-08-24 | End: 2023-08-24 | Stop reason: HOSPADM

## 2023-08-24 RX ADMIN — HYDROMORPHONE HYDROCHLORIDE 0.5 MG: 1 INJECTION, SOLUTION INTRAMUSCULAR; INTRAVENOUS; SUBCUTANEOUS at 00:00

## 2023-08-24 RX ADMIN — ACETAMINOPHEN 975 MG: 325 TABLET, FILM COATED ORAL at 05:48

## 2023-08-24 RX ADMIN — SODIUM CHLORIDE, SODIUM GLUCONATE, SODIUM ACETATE, POTASSIUM CHLORIDE, MAGNESIUM CHLORIDE, SODIUM PHOSPHATE, DIBASIC, AND POTASSIUM PHOSPHATE 125 ML/HR: .53; .5; .37; .037; .03; .012; .00082 INJECTION, SOLUTION INTRAVENOUS at 02:11

## 2023-08-24 RX ADMIN — FAMOTIDINE 20 MG: 20 TABLET, FILM COATED ORAL at 13:56

## 2023-08-24 RX ADMIN — ACETAMINOPHEN 975 MG: 325 TABLET, FILM COATED ORAL at 13:56

## 2023-08-24 RX ADMIN — ONDANSETRON 4 MG: 2 INJECTION INTRAMUSCULAR; INTRAVENOUS at 08:05

## 2023-08-24 NOTE — DISCHARGE SUMMARY
INTERNAL MEDICINE RESIDENCY DISCHARGE SUMMARY     Jody Keenan   34 y.o. female  MRN: [id number]  Room/Bed: [facility] 211/[facility] 211-01 [address] [facility]   Encounter: [id number]    Principal Problem:    Abdominal pain with vomiting  Active Problems:    Hypertension    Anxiety    Class 1 obesity due to excess calories without serious comorbidity with body mass index (BMI) of 32.0 to 32.9 in adult    RIVER (nonalcoholic steatohepatitis)      RIVER (nonalcoholic steatohepatitis)  Assessment & Plan  Confirmed by liver biopsy 2/2023. Chronically elevated LFTs. Follows outpatient with [facility] GI/Hepatology    Hypertension  Assessment & Plan  Managed outpatient without need for hypertensive medications. Will continue off antihypertensives at this time and continue to monitor. * Abdominal pain with vomiting  Assessment & Plan  Patient with one day of left sided abdominal pain and vomiting. She does report restarting her Wegovy the day prior to the onset of the abdominal pain. However she had been on this prior and took a one month break due to supply issues at pharmacy. No new or concerning foods prior to onset. No radiating back pain. DDx: viral gastroenteritis, medication side effect, bowel obstruction, pancreatitis, choledocholithiasis   Wegovy does have abdominal side effects, notably nausea, vomiting, and diarrhea. However, she had been on this prior with no issue. She has had a bowel obstruction in the past, and does complain of constipation with last BM 3-4 days ago. But this would not explain her elevated white count. Also CT does not show signs of bowel obstruction. Not concerned for pancreatitis given negative lipase. Not concerned for choledocholithiasis as history is not suggestive of this and CT abdomen did not show signs of any stones. Most likely viral gastroenteritis in setting of elevated white count and normal CT.    Systems consistent with possible delayed gastric emptying from [facility] or viral gastroenteritis. Plan:  · Hold Wegovy on discharge until assessing with  · We will discharge with Pepcid for about a week and Zofran as needed for nausea   recommended continuing a crackers and toast diet as tolerated and progressing  · Surgical diet with advancement as tolerated  · Don't need to follow LFTs as chronically elevated in setting of [facility]     Pt is a 32-year-old female with past medical history of Dulce Bernard, obesity, hypertension, SVT status post ablation presented to Ed due to N/V and epigastric and LUQ pain. While in the ED given fluids, Zofran, Toradol, and dilaudid for N/V and pain. Biliary U/S performed in ED revealed no abnormalities. CT abdomen/pelvis showed "No acute abnormality. Hepatic steatosis", unremarkable for abdominal or  pathology. CBC showed elevated WBC. Pt was admitted to floor for observation due to continued nausea, headache, and suspected viral gastroenteritis. Pt was able to tolerate some clear liquids, but became nauseated again. Pt was given Pepcid 20mg. Throughout the day, stomach pain and N/V decreased but headache persisted and was given tylenol for it. Pt stated that she started to feel better and feels that she is well enough to go home. Patient is stable at time of discharge. She denies any fevers, chills, repeat nausea after Zofran, repeat vomiting, shortness of breath, chest pain, diarrhea. She also reports chronic constipation with no worsening of that.   she reports negative improvement from when she reported initially. She will require follow-up with her PCP with possible consideration of discontinuing Wegovy due to concern for slow gastric emptying. She is instructed to continue take Pepcid for a week and Zofran as needed for a week for nausea. We recommended that she continue crackers and toast diet on discharge.     DISCHARGE INFORMATION     PCP at Discharge: Heaven Boateng, MD    Admitting Provider: Sivakumar Gaxiola MD  Admission Date: 8/23/2023    Discharge Provider: Sivakumar Gaxiola MD  Discharge Date: 8/24/23    Discharge Disposition: Home/Self Care  Discharge Condition: good  Discharge with Lines: no    Discharge Diet: clear liquids, advance as tolerated  Activity Restrictions: none  Test Results Pending at Discharge: none    Discharge Diagnoses:  Principal Problem:    Abdominal pain with vomiting  Active Problems:    Hypertension    Anxiety    Class 1 obesity due to excess calories without serious comorbidity with body mass index (BMI) of 32.0 to 32.9 in adult    RIVER (nonalcoholic steatohepatitis)  Resolved Problems:    * No resolved hospital problems. *      Consulting Providers:      Diagnostic & Therapeutic Procedures Performed:  CT abdomen pelvis with contrast    Result Date: 8/23/2023  Impression: No acute abnormality. Hepatic steatosis. Workstation performed: [id number]       Code Status: Level 1 - Full Code  Advance Directive & Living Will: <no information>  Power of :    POLST:      Medications:  Current Discharge Medication List        Current Discharge Medication List        Current Discharge Medication List      CONTINUE these medications which have NOT CHANGED    Details   citalopram (CeleXA) 20 mg tablet Take 1 tablet (20 mg total) by mouth daily  Qty: 90 tablet, Refills: 1    Associated Diagnoses: Anxiety      montelukast (SINGULAIR) 10 mg tablet Take 1 tablet (10 mg total) by mouth daily at bedtime  Qty: 90 tablet, Refills: 1    Associated Diagnoses: Seasonal allergic rhinitis, unspecified trigger      norethindrone (Ortho Micronor) 0.35 MG tablet Take 1 tablet (0.35 mg total) by mouth daily  Qty: 28 tablet, Refills: 6    Associated Diagnoses: Encounter for initial prescription of contraceptive pills;  Hypertension, unspecified type      prochlorperazine (COMPAZINE) 10 mg tablet Take 1 tablet (10 mg total) by mouth every 6 (six) hours as needed for nausea or vomiting  Qty: 30 tablet, Refills: 3    Associated Diagnoses: Nausea      Semaglutide-Weight Management (WEGOVY) 0.5 MG/0.5ML Inject 0.5 mL (0.5 mg total) under the skin once a week  Qty: 2 mL, Refills: 0    Associated Diagnoses: Class 1 obesity due to excess calories without serious comorbidity with body mass index (BMI) of 32.0 to 32.9 in adult      Semaglutide-Weight Management (WEGOVY) 1 MG/0.5ML Inject 0.5 mL (1 mg total) under the skin once a week  Qty: 2 mL, Refills: 0    Associated Diagnoses: Class 1 obesity due to excess calories without serious comorbidity with body mass index (BMI) of 32.0 to 32.9 in adult      Wegovy 1.7 MG/0.75ML INJECT 0.75 ML (1.7 MG TOTAL) UNDER THE SKIN ONCE A WEEK  Qty: 3 mL, Refills: 0    Associated Diagnoses: Class 1 obesity due to excess calories without serious comorbidity with body mass index (BMI) of 32.0 to 32.9 in adult             Allergies:  No Known Allergies    FOLLOW-UP     PCP Outpatient Follow-up:  Please follow-up with PCP within 1 week    Consulting Providers Follow-up:  none required     Active Issues Requiring Follow-up:   Abdominal pain    Discharge Statement:   I spent 30 minutes minutes discharging the patient. This time was spent on the day of discharge. I had direct contact with the patient on the day of discharge. Additional documentation is required if more than 30 minutes were spent on discharge. Portions of the record may have been created with voice recognition software. Occasional wrong word or "sound a like" substitutions may have occurred due to the inherent limitations of voice recognition software.   Read the chart carefully and recognize, using context, where substitutions have occurred.    ==  Samantha Castellanos, DO  Internal Medicine PGY-2  [facility]

## 2023-08-24 NOTE — ASSESSMENT & PLAN NOTE
Patient with one day of left sided abdominal pain and vomiting. She does report restarting her Wegovy the day prior to the onset of the abdominal pain. However she had been on this prior and took a one month break due to supply issues at pharmacy. No new or concerning foods prior to onset. No radiating back pain. DDx: viral gastroenteritis, medication side effect, bowel obstruction, pancreatitis, choledocholithiasis   Wegovy does have abdominal side effects, notably nausea, vomiting, and diarrhea. However, she had been on this prior with no issue. She has had a bowel obstruction in the past, and does complain of constipation with last BM 3-4 days ago. But this would not explain her elevated white count. Also CT does not show signs of bowel obstruction. Not concerned for pancreatitis given negative lipase. Not concerned for choledocholithiasis as history is not suggestive of this and CT abdomen did not show signs of any stones. Most likely viral gastroenteritis in setting of elevated white count and normal CT. Systems consistent with possible delayed gastric emptying from [facility] or viral gastroenteritis.     Plan:  · Hold Wegovy on discharge until assessing with  · We will discharge with Pepcid for about a week and Zofran as needed for nausea   recommended continuing a crackers and toast diet as tolerated and progressing  · Surgical diet with advancement as tolerated  · Don't need to follow LFTs as chronically elevated in setting of RIVER

## 2023-08-24 NOTE — ED ATTENDING ATTESTATION
8/23/2023   I, Griffith Brunner, MD, saw and evaluated the patient. I have discussed the patient with the resident/non-physician practitioner and agree with the resident's/non-physician practitioner's findings, Plan of Care, and MDM as documented in the resident's/non-physician practitioner's note, except where noted. All available labs and Radiology studies were reviewed. I was present for key portions of any procedure(s) performed by the resident/non-physician practitioner and I was immediately available to provide assistance. At this point I agree with the current assessment done in the Emergency Department. I have conducted an independent evaluation of this patient a history and physical is as follows:    Unit/Bed#: [facility] Encounter: [id number]    Chief Complaint   Patient presents with   • Abdominal Pain     Pt c/o epigastric pain, N/V for the past 16 hours      34year old female presenting with predominantly right upper abdominal pain with radiation to right flank that started at 1 am today, associated with nausea and greater than 20 episodes of emesis. No BM x 2-3 days and now not passing gas. History of SBO 3 years ago treated conservatively. No prior abdominal surgeries. Prior intermittent LFT elevation worked up with liver biopsy which revealed fatty infiltration of the liver. No chest pain or shortness of breath. No sick contacts. Physical Exam  /87 (BP Location: Left arm)   Pulse 92   Temp 98.2 °F (36.8 °C) (Temporal)   Resp 18   SpO2 96%      Vital signs and nursing notes reviewed    CONSTITUTIONAL: female appearing stated age resting in bed, In discomfort due to abdominal pain  HEENT: atraumatic, normocephalic. Sclera anicteric, conjunctiva are not injected. Moist oral mucosa  CARDIOVASCULAR/CHEST: RRR, no M/R/G. 2+ radial pulses  PULMONARY: Breathing comfortably on RA. Breath sounds are equal and clear to auscultation  ABDOMEN: non-distended. BS present, normoactive.  RUQ abdominal pain without rebound or guarding. MSK: moves all extremities, no deformities, no peripheral edema, no calf asymmetry  NEURO: Awake, alert, and oriented x 3. Face symmetric. Moves all extremities spontaneously.  No focal neurologic deficits  SKIN: Warm, appears well-perfused  MENTAL STATUS: Normal affect      Labs and Imaging  Labs Reviewed   CBC AND DIFFERENTIAL - Abnormal       Result Value Ref Range Status    WBC 15.89 (*) 4.31 - 10.16 Thousand/uL Final    RBC 5.14 (*) 3.81 - 5.12 Million/uL Final    Hemoglobin 15.3  11.5 - 15.4 g/dL Final    Hematocrit 45.2  34.8 - 46.1 % Final    MCV 88  82 - 98 fL Final    MCH 29.8  26.8 - 34.3 pg Final    MCHC 33.8  31.4 - 37.4 g/dL Final    RDW 13.0  11.6 - 15.1 % Final    MPV 10.2  8.9 - 12.7 fL Final    Platelets 018  512 - 390 Thousands/uL Final    nRBC 0  /100 WBCs Final    Neutrophils Relative 82 (*) 43 - 75 % Final    Immat GRANS % 0  0 - 2 % Final    Lymphocytes Relative 15  14 - 44 % Final    Monocytes Relative 3 (*) 4 - 12 % Final    Eosinophils Relative 0  0 - 6 % Final    Basophils Relative 0  0 - 1 % Final    Neutrophils Absolute 12.89 (*) 1.85 - 7.62 Thousands/µL Final    Immature Grans Absolute 0.06  0.00 - 0.20 Thousand/uL Final    Lymphocytes Absolute 2.44  0.60 - 4.47 Thousands/µL Final    Monocytes Absolute 0.46  0.17 - 1.22 Thousand/µL Final    Eosinophils Absolute 0.01  0.00 - 0.61 Thousand/µL Final    Basophils Absolute 0.03  0.00 - 0.10 Thousands/µL Final   COMPREHENSIVE METABOLIC PANEL - Abnormal    Sodium 138  135 - 147 mmol/L Final    Potassium 3.8  3.5 - 5.3 mmol/L Final    Chloride 104  96 - 108 mmol/L Final    CO2 23  21 - 32 mmol/L Final    ANION GAP 11  mmol/L Final    BUN 10  5 - 25 mg/dL Final    Creatinine 0.59 (*) 0.60 - 1.30 mg/dL Final    Comment: Standardized to IDMS reference method    Glucose 91  65 - 140 mg/dL Final    Comment: If the patient is fasting, the ADA then defines impaired fasting glucose as > 100 mg/dL and diabetes as > or equal to 123 mg/dL. Calcium 9.3  8.4 - 10.2 mg/dL Final    AST 58 (*) 13 - 39 U/L Final     (*) 7 - 52 U/L Final    Comment: Specimen collection should occur prior to Sulfasalazine administration due to the potential for falsely depressed results. Alkaline Phosphatase 73  34 - 104 U/L Final    Total Protein 8.1  6.4 - 8.4 g/dL Final    Albumin 4.6  3.5 - 5.0 g/dL Final    Total Bilirubin 0.90  0.20 - 1.00 mg/dL Final    Comment: Use of this assay is not recommended for patients undergoing treatment with eltrombopag due to the potential for falsely elevated results. N-acetyl-p-benzoquinone imine (metabolite of Acetaminophen) will generate erroneously low results in samples for patients that have taken an overdose of Acetaminophen. eGFR 122  ml/min/1.73sq m Final    Narrative:     [place] guidelines for Chronic Kidney Disease (CKD):   •  Stage 1 with normal or high GFR (GFR > 90 mL/min/1.73 square meters)  •  Stage 2 Mild CKD (GFR = 60-89 mL/min/1.73 square meters)  •  Stage 3A Moderate CKD (GFR = 45-59 mL/min/1.73 square meters)  •  Stage 3B Moderate CKD (GFR = 30-44 mL/min/1.73 square meters)  •  Stage 4 Severe CKD (GFR = 15-29 mL/min/1.73 square meters)  •  Stage 5 End Stage CKD (GFR <15 mL/min/1.73 square meters)  Note: GFR calculation is accurate only with a steady state creatinine   LIPASE - Normal    Lipase 33  11 - 82 u/L Final   HS TROPONIN I 0HR - Normal    hs TnI 0hr 4  "Refer to ACS Flowchart"- see link ng/L Final    Comment:                                              Initial (time 0) result  If >=50 ng/L, Myocardial injury suggested ;  Type of myocardial injury and treatment strategy  to be determined. If 5-49 ng/L, a delta result at 2 hours and or 4 hours will be needed to further evaluate. If <4 ng/L, and chest pain has been >3 hours since onset, patient may qualify for discharge based on the HEART score in the ED.   If <5 ng/L and <3hours since onset of chest pain, a delta result at 2 hours will be needed to further evaluate. HS Troponin 99th Percentile URL of a Health Population=12 ng/L with a 95% Confidence Interval of 8-18 ng/L. Second Troponin (time 2 hours)  If calculated delta >= 20 ng/L,  Myocardial injury suggested ; Type of myocardial injury and treatment strategy to be determined. If 5-49 ng/L and the calculated delta is 5-19 ng/L, consult medical service for evaluation. Continue evaluation for ischemia on ecg and other possible etiology and repeat hs troponin at 4 hours. If delta is <5 ng/L at 2 hours, consider discharge based on risk stratification via the HEART score (if in ED), or DAT risk score in IP/Observation. HS Troponin 99th Percentile URL of a Health Population=12 ng/L with a 95% Confidence Interval of 8-18 ng/L.   UA W REFLEX TO MICROSCOPIC WITH REFLEX TO CULTURE   POCT PREGNANCY, URINE       CT abdomen pelvis with contrast    (Results Pending)         Procedures  Procedures        ED Course  Medications   multi-electrolyte (PLASMALYTE-A/ISOLYTE-S PH 7.4) IV solution (has no administration in time range)   ketorolac (TORADOL) injection 15 mg (15 mg Intravenous Given 8/23/23 2150)   ondansetron (ZOFRAN) injection 4 mg (4 mg Intravenous Given 8/23/23 2147)   sodium chloride 0.9 % bolus 1,000 mL (0 mL Intravenous Stopped 8/24/23 0000)   iohexol (OMNIPAQUE) 350 MG/ML injection (SINGLE-DOSE) 100 mL (100 mL Intravenous Given 8/23/23 2212)   HYDROmorphone (DILAUDID) injection 0.5 mg (0.5 mg Intravenous Given 8/24/23 0000)     34 y.o. female presenting with abdominal pain, nausea and vomiting. VS reviewed, afebrile and WNL. Labs with leukocytosis and mild transaminitis. Will obtain CT a/p, treat symptomatically and plan for admission.

## 2023-08-24 NOTE — UTILIZATION REVIEW
Initial Clinical Review    Admission: Date/Time/Statement:   Admission Orders (From admission, onward)     Ordered        08/23/23 9103  Place in Observation  Once                      Orders Placed This Encounter   Procedures   • Place in Observation     Standing Status:   Standing     Number of Occurrences:   1     Order Specific Question:   Level of Care     Answer:   Med Surg [16]     ED Arrival Information     Expected   -    Arrival   8/23/2023 18:26    Acuity   Urgent            Means of arrival   Walk-In    Escorted by   Family Member    Service   [facility]    Admission type   Emergency            Arrival complaint   N/V/ABD pain           Chief Complaint   Patient presents with   • Abdominal Pain     Pt c/o epigastric pain, N/V for the past 16 hours        Initial Presentation: 34 y.o. female presents to ed from home for evaluation and treatment of nausea, vomiting and epigastric pain. PMHX: DEPRESSION , HTN, SVT    Clinical assessment significant for WBC 15.89, AST 58, . US normal gallbladder. Initially treated with iv toradol x1, iv zofran x1,iv .9% ns bolus. NPO  Admit to observation for possible gastroenteritis. Date: 8- 24-23  Day 2: observation  WBC 10.39. Patient with persistent pain 7/10 improving. Treated with iv dilaudid at  0000. Received po tylenol x  0548. Nausea treated with iv zofran at [address]. Continue iv multi electrolyte 125/hr.   Advance diet to full liquid with toast     ED Triage Vitals   08/23/23 1833 08/23/23 1834 08/23/23 1834 08/23/23 1834 08/23/23 1834   98.2 °F (36.8 °C) (!) 109 18 164/97 96 %      Temporal Monitor         Pain Score       7          05/23/23 101 kg (222 lb)     Additional Vital Signs:      BP Temp Pulse Resp SpO2   08/24/23 1050 123/82 98.8 °F (37.1 °C) 93 -- 97 %   08/24/23 0548 123/78 -- 74 16 97 %   08/23/23 2300 140/82 -- 84 -- 95 %   08/23/23 1941 158/87 -- 92 18 96 %   08/23/23 1834 164/97 -- (!) 109 18 96 %   08/23/23 1833 -- 98.2 °F (36.8 °C) -- -- --       Pertinent Labs/Diagnostic Test Results:     CT abdomen pelvis with contrast   Final (08/23 2329)      No acute abnormality. Hepatic steatosis.                   Results from last 7 days   Lab Units 08/24/23  0518 08/23/23  1838   WBC Thousand/uL 10.39* 15.89*   HEMOGLOBIN g/dL 13.3 15.3   HEMATOCRIT % 41.7 45.2   PLATELETS Thousands/uL 226 299   NEUTROS ABS Thousands/µL  --  12.89*         Results from last 7 days   Lab Units 08/24/23  0518 08/23/23  1838   SODIUM mmol/L 141 138   POTASSIUM mmol/L 3.7 3.8   CHLORIDE mmol/L 107 104   CO2 mmol/L 27 23   ANION GAP mmol/L 7 11   BUN mg/dL 11 10   CREATININE mg/dL 0.56* 0.59*   EGFR ml/min/1.73sq m 124 122   CALCIUM mg/dL 8.7 9.3     Results from last 7 days   Lab Units 08/23/23  1838   AST U/L 58*   ALT U/L 108*   ALK PHOS U/L 73   TOTAL PROTEIN g/dL 8.1   ALBUMIN g/dL 4.6   TOTAL BILIRUBIN mg/dL 0.90         Results from last 7 days   Lab Units 08/24/23  0518 08/23/23  1838   GLUCOSE RANDOM mg/dL 80 91         Results from last 7 days   Lab Units 08/23/23  1838   HS TNI 0HR ng/L 4         Results from last 7 days   Lab Units 08/23/23  1838   LIPASE u/L 33       Results from last 7 days   Lab Units 08/23/23  2146   CLARITY UA  Clear   COLOR UA  Yellow   SPEC GRAV UA  1.028   PH UA  7.5   GLUCOSE UA mg/dl Negative   KETONES UA mg/dl 40 (2+)*   BLOOD UA  Negative   PROTEIN UA mg/dl 50 (1+)*   NITRITE UA  Negative   BILIRUBIN UA  Negative   UROBILINOGEN UA (BE) mg/dl <2.0   LEUKOCYTES UA  Small*   WBC UA /hpf 4-10*   RBC UA /hpf None Seen   BACTERIA UA /hpf None Seen   EPITHELIAL CELLS WET PREP /hpf Occasional   MUCUS THREADS  Innumerable*       ED Treatment:   Medication Administration from 08/23/2023 1825 to 08/24/2023 1041       Date/Time Order Dose Route Action     08/23/2023 2150 EDT ketorolac (TORADOL) injection 15 mg 15 mg Intravenous Given     08/23/2023 2147 EDT ondansetron (ZOFRAN) injection 4 mg 4 mg Intravenous Given     08/23/2023 2151 EDT sodium chloride 0.9 % bolus 1,000 mL 1,000 mL Intravenous New Bag     08/24/2023 0000 EDT HYDROmorphone (DILAUDID) injection 0.5 mg 0.5 mg Intravenous Given     08/24/2023 0203 EDT montelukast (SINGULAIR) tablet 10 mg 0 mg Oral Hold     08/24/2023 0805 EDT ondansetron (ZOFRAN) injection 4 mg 4 mg Intravenous Given     08/24/2023 0211 EDT multi-electrolyte (PLASMALYTE-A/ISOLYTE-S PH 7.4) IV solution 125 mL/hr Intravenous New Bag     08/24/2023 0548 EDT acetaminophen (TYLENOL) tablet 975 mg 975 mg Oral Given        Past Medical History:   Diagnosis   • Allergic   • Anxiety   • Depression   • Hypertension   • SVT (supraventricular tachycardia) (HCC)     Present on Admission:  • Anxiety  • Hypertension      Admitting Diagnosis: Abdominal pain [R10.9]     Age/Sex: 34 y.o. female    Scheduled Medications:    citalopram, 20 mg, Oral, Daily  famotidine, 20 mg, Oral, BID  montelukast, 10 mg, Oral, HS      Continuous IV Infusions:  multi-electrolyte, 125 mL/hr, Intravenous, Continuous      PRN Meds:  acetaminophen, 975 mg, Oral, Q6H PRN  ondansetron, 4 mg, Intravenous, Q8H PRN        None    Network Utilization Review Department  ATTENTION: Please call with any questions or concerns to [phone] and carefully listen to the prompts so that you are directed to the right person. All voicemails are confidential.  [facility] all requests for admission clinical reviews, approved or denied determinations and any other requests to dedicated fax number below belonging to the campus where the patient is receiving treatment.  List of dedicated fax numbers for the Facilities:  [place] DENIALS (Administrative/Medical Necessity) [phone]   [facility] [address] [facility] [phone]   [facility] [phone]   [address] [phone] [address] [facility] [address] [address] [facility] [address] [address] [address] [phone]

## 2023-08-24 NOTE — DISCHARGE INSTR - AVS FIRST PAGE
Please take the Pepcid for 1 week  You may also take Zofran for nausea as needed    Please follow-up with your PCP in 1 week    Please return to the ED or to your PCP if abdominal pain gets worse, continue to have nausea and vomiting, develop fevers or chills    Please continue to hydrate as long as you continue to have vomiting.

## 2023-08-24 NOTE — ED PROVIDER NOTES
History  Chief Complaint   Patient presents with   • Abdominal Pain     Pt c/o epigastric pain, N/V for the past 16 hours      Patient is a 61-year-old female with a significant past medical history of anxiety, depression, hypertension, presenting for evaluation of abdominal pain. She says that she has been having this pain for the last 16 hours or so. She did describes a epigastric and right upper quadrant abdominal cramping type sensation. Is not associated with anything in particular. She has had nausea with greater than 10 episodes of nonbloody, nonbilious vomiting. She denies any change in bowel movements. She denies any urinary symptoms. She denies any fevers. She otherwise denies acute complaints at this time. Prior to Admission Medications   Prescriptions Last Dose Informant Patient Reported? Taking?    Semaglutide-Weight Management (WEGOVY) 0.5 MG/0.5ML   No No   Sig: Inject 0.5 mL (0.5 mg total) under the skin once a week   Semaglutide-Weight Management (WEGOVY) 1 MG/0.5ML   No No   Sig: Inject 0.5 mL (1 mg total) under the skin once a week   Wegovy 1.7 MG/0.75ML   No No   Sig: INJECT 0.75 ML (1.7 MG TOTAL) UNDER THE SKIN ONCE A WEEK   citalopram (CeleXA) 20 mg tablet   No No   Sig: Take 1 tablet (20 mg total) by mouth daily   montelukast (SINGULAIR) 10 mg tablet   No No   Sig: Take 1 tablet (10 mg total) by mouth daily at bedtime   norethindrone (Ortho Micronor) 0.35 MG tablet  Self No No   Sig: Take 1 tablet (0.35 mg total) by mouth daily   prochlorperazine (COMPAZINE) 10 mg tablet   No No   Sig: Take 1 tablet (10 mg total) by mouth every 6 (six) hours as needed for nausea or vomiting      Facility-Administered Medications: None       Past Medical History:   Diagnosis Date   • Allergic    • Anxiety    • Depression    • Hypertension    • SVT (supraventricular tachycardia) (HCC)        Past Surgical History:   Procedure Laterality Date   • CARDIAC ELECTROPHYSIOLOGY PROCEDURE N/A 1/30/2023 Procedure: Cardiac eps/svt ablation;  Surgeon: Juan Shanks DO;  Location: [facility]; Service: Cardiology   • IR BIOPSY LIVER RANDOM  2/9/2023       Family History   Problem Relation Age of Onset   • Depression Mother    • Thyroid disease Mother    • Hypertension Mother    • Depression Father    • Depression Maternal Grandmother    • Thyroid disease Maternal Grandmother    • Hypertension Maternal Grandmother      I have reviewed and agree with the history as documented. E-Cigarette/Vaping   • E-Cigarette Use Never User      E-Cigarette/Vaping Substances     Social History     Tobacco Use   • Smoking status: Never   • Smokeless tobacco: Never   Vaping Use   • Vaping Use: Never used   Substance Use Topics   • Alcohol use: Yes     Alcohol/week: 2.0 standard drinks of alcohol     Types: 2 Glasses of wine per week     Comment: socially   • Drug use: Never        Review of Systems   Constitutional: Negative for fever. Respiratory: Negative for shortness of breath. Cardiovascular: Negative for chest pain. Gastrointestinal: Positive for abdominal pain, nausea and vomiting. Negative for constipation and diarrhea. Genitourinary: Negative for vaginal bleeding and vaginal discharge. All other systems reviewed and are negative.       Physical Exam  ED Triage Vitals   Temperature Pulse Respirations Blood Pressure SpO2   08/23/23 1833 08/23/23 1834 08/23/23 1834 08/23/23 1834 08/23/23 1834   98.2 °F (36.8 °C) (!) 109 18 164/97 96 %      Temp Source Heart Rate Source Patient Position - Orthostatic VS BP Location FiO2 (%)   08/23/23 1833 08/23/23 1834 08/23/23 1834 08/23/23 1834 --   Temporal Monitor Lying Right arm       Pain Score       08/23/23 1833       7             Orthostatic Vital Signs  Vitals:    08/23/23 2300 08/24/23 0548 08/24/23 1050 08/24/23 1511   BP: 140/82 123/78 123/82 128/89   Pulse: 84 74 93 88   Patient Position - Orthostatic VS:           Physical Exam  Vitals and nursing note reviewed. Constitutional:       General: She is not in acute distress. Appearance: Normal appearance. She is obese. She is ill-appearing. She is not toxic-appearing. HENT:      Head: Normocephalic and atraumatic. Right Ear: External ear normal.      Left Ear: External ear normal.      Nose: Nose normal.      Mouth/Throat:      Mouth: Mucous membranes are dry. Eyes:      General: No scleral icterus. Right eye: No discharge. Left eye: No discharge. Extraocular Movements: Extraocular movements intact. Conjunctiva/sclera: Conjunctivae normal.   Cardiovascular:      Rate and Rhythm: Tachycardia present. Heart sounds: Normal heart sounds. No murmur heard. No friction rub. No gallop. Pulmonary:      Effort: Pulmonary effort is normal. No respiratory distress. Breath sounds: Normal breath sounds. Abdominal:      General: Abdomen is flat. There is no distension. Palpations: Abdomen is soft. There is no mass. Tenderness: There is abdominal tenderness in the right upper quadrant and epigastric area. There is no guarding. Genitourinary:     Comments: Deferred  Skin:     General: Skin is warm and dry. Neurological:      General: No focal deficit present. Mental Status: She is alert.          ED Medications  Medications   ketorolac (TORADOL) injection 15 mg (15 mg Intravenous Given 8/23/23 2150)   ondansetron (ZOFRAN) injection 4 mg (4 mg Intravenous Given 8/23/23 2147)   sodium chloride 0.9 % bolus 1,000 mL (0 mL Intravenous Stopped 8/24/23 0000)   iohexol (OMNIPAQUE) 350 MG/ML injection (SINGLE-DOSE) 100 mL (100 mL Intravenous Given 8/23/23 2212)   HYDROmorphone (DILAUDID) injection 0.5 mg (0.5 mg Intravenous Given 8/24/23 0000)       Diagnostic Studies  Results Reviewed     Procedure Component Value Units Date/Time    Basic metabolic panel [[id number]]  (Abnormal) Collected: 08/24/23 0518    Lab Status: Final result Specimen: Blood from Arm, Right Updated: 08/24/23 0604     Sodium 141 mmol/L      Potassium 3.7 mmol/L      Chloride 107 mmol/L      CO2 27 mmol/L      ANION GAP 7 mmol/L      BUN 11 mg/dL      Creatinine 0.56 mg/dL      Glucose 80 mg/dL      Calcium 8.7 mg/dL      eGFR 124 ml/min/1.73sq m     Narrative:      [facility] guidelines for Chronic Kidney Disease (CKD):   •  Stage 1 with normal or high GFR (GFR > 90 mL/min/1.73 square meters)  •  Stage 2 Mild CKD (GFR = 60-89 mL/min/1.73 square meters)  •  Stage 3A Moderate CKD (GFR = 45-59 mL/min/1.73 square meters)  •  Stage 3B Moderate CKD (GFR = 30-44 mL/min/1.73 square meters)  •  Stage 4 Severe CKD (GFR = 15-29 mL/min/1.73 square meters)  •  Stage 5 End Stage CKD (GFR <15 mL/min/1.73 square meters)  Note: GFR calculation is accurate only with a steady state creatinine    CBC (With Platelets) [[id number]]  (Abnormal) Collected: 08/24/23 0518    Lab Status: Final result Specimen: Blood from Arm, Right Updated: 08/24/23 0536     WBC 10.39 Thousand/uL      RBC 4.52 Million/uL      Hemoglobin 13.3 g/dL      Hematocrit 41.7 %      MCV 92 fL      MCH 29.4 pg      MCHC 31.9 g/dL      RDW 13.1 %      Platelets 534 Thousands/uL      MPV 10.3 fL     Urine Microscopic [[id number]]  (Abnormal) Collected: 08/23/23 2146    Lab Status: Final result Specimen: Urine, Other Updated: 08/23/23 2202     RBC, UA None Seen /hpf      WBC, UA 4-10 /hpf      Epithelial Cells Occasional /hpf      Bacteria, UA None Seen /hpf      MUCUS THREADS Innumerable    UA w Reflex to Microscopic w Reflex to Culture [[id number]]  (Abnormal) Collected: 08/23/23 2146    Lab Status: Final result Specimen: Urine, Other Updated: 08/23/23 2159     Color, UA Yellow     Clarity, UA Clear     Specific Gravity, UA 1.028     pH, UA 7.5     Leukocytes, UA Small     Nitrite, UA Negative     Protein, UA 50 (1+) mg/dl      Glucose, UA Negative mg/dl      Ketones, UA 40 (2+) mg/dl      Urobilinogen, UA <2.0 mg/dl      Bilirubin, UA Negative     Occult Blood, UA Negative    POCT pregnancy, urine [[id number]]  (Normal) Resulted: 08/23/23 2150    Lab Status: Final result Specimen: Urine Updated: 08/23/23 2150     EXT Preg Test, Ur Negative     Control Valid    HS Troponin 0hr (reflex protocol) [[id number]]  (Normal) Collected: 08/23/23 1838    Lab Status: Final result Specimen: Blood from Arm, Right Updated: 08/23/23 1926     hs TnI 0hr 4 ng/L     Comprehensive metabolic panel [[id number]]  (Abnormal) Collected: 08/23/23 1838    Lab Status: Final result Specimen: Blood from Arm, Right Updated: 08/23/23 1906     Sodium 138 mmol/L      Potassium 3.8 mmol/L      Chloride 104 mmol/L      CO2 23 mmol/L      ANION GAP 11 mmol/L      BUN 10 mg/dL      Creatinine 0.59 mg/dL      Glucose 91 mg/dL      Calcium 9.3 mg/dL      AST 58 U/L       U/L      Alkaline Phosphatase 73 U/L      Total Protein 8.1 g/dL      Albumin 4.6 g/dL      Total Bilirubin 0.90 mg/dL      eGFR 122 ml/min/1.73sq m     Narrative:      [place] guidelines for Chronic Kidney Disease (CKD):   •  Stage 1 with normal or high GFR (GFR > 90 mL/min/1.73 square meters)  •  Stage 2 Mild CKD (GFR = 60-89 mL/min/1.73 square meters)  •  Stage 3A Moderate CKD (GFR = 45-59 mL/min/1.73 square meters)  •  Stage 3B Moderate CKD (GFR = 30-44 mL/min/1.73 square meters)  •  Stage 4 Severe CKD (GFR = 15-29 mL/min/1.73 square meters)  •  Stage 5 End Stage CKD (GFR <15 mL/min/1.73 square meters)  Note: GFR calculation is accurate only with a steady state creatinine    Lipase [[id number]]  (Normal) Collected: 08/23/23 1838    Lab Status: Final result Specimen: Blood from Arm, Right Updated: 08/23/23 1906     Lipase 33 u/L     CBC and differential [[id number]]  (Abnormal) Collected: 08/23/23 1838    Lab Status: Final result Specimen: Blood from Arm, Right Updated: 08/23/23 1846     WBC 15.89 Thousand/uL      RBC 5.14 Million/uL      Hemoglobin 15.3 g/dL      Hematocrit 45.2 % MCV 88 fL      MCH 29.8 pg      MCHC 33.8 g/dL      RDW 13.0 %      MPV 10.2 fL      Platelets 166 Thousands/uL      nRBC 0 /100 WBCs      Neutrophils Relative 82 %      Immat GRANS % 0 %      Lymphocytes Relative 15 %      Monocytes Relative 3 %      Eosinophils Relative 0 %      Basophils Relative 0 %      Neutrophils Absolute 12.89 Thousands/µL      Immature Grans Absolute 0.06 Thousand/uL      Lymphocytes Absolute 2.44 Thousands/µL      Monocytes Absolute 0.46 Thousand/µL      Eosinophils Absolute 0.01 Thousand/µL      Basophils Absolute 0.03 Thousands/µL                  CT abdomen pelvis with contrast   Final Result by Corrinne Furl, DO (08/23 2329)      No acute abnormality. Hepatic steatosis. Workstation performed: [id number]               Procedures  Procedures      ED Course                                       Medical Decision Making  Patient is a 72-year-old female presenting for evaluation of abdominal pain. Based on history evaluation, differential diagnosis includes but is not limited to: Viral gastritis, GERD, cholecystitis, pancreatitis, urinary tract infection. Plan: CBC, CMP, and lipase already sent off by first nurse labs. Reviewed by me and notable for a leukocytosis as well as elevated liver enzymes although these appear to be chronically elevated in this patient. We will add a CT abdomen pelvis as well as pain control, fluids, reassessment    CT abdomen pelvis notable for hepatic steatosis otherwise unremarkable. Patient necessitating multiple doses of pain medication and is not tolerating oral intake. Recommend admission for undifferentiated abdominal pain. Patient seems to understand this plan and is agreeable. All questions answered. Patient admitted. I independently reviewed this patient's chart. Consider chronic medical conditions in my medical decision making. Amount and/or Complexity of Data Reviewed  Labs: ordered.   Radiology: ordered. Risk  Prescription drug management. Decision regarding hospitalization. Disposition  Final diagnoses:   Abdominal pain     Time reflects when diagnosis was documented in both MDM as applicable and the Disposition within this note     Time User Action Codes Description Comment    8/23/2023 11:53 PM [address] [R10.9] Abdominal pain       ED Disposition     ED Disposition   Admit    Condition   Stable    Date/Time   Wed Aug 23, 2023 11:53 PM    Comment   Case was discussed with SOD and the patient's admission status was agreed to be Admission Status: observation status to the service of Dr. Shun Clifford .            Follow-up Information     Follow up With Specialties Details Why Contact Info    MONTY Thomas Nurse Practitioner Follow up in 1 week(s)  [address] [facility]  [phone]            Discharge Medication List as of 8/24/2023  3:20 PM      START taking these medications    Details   famotidine (PEPCID) 20 mg tablet Take 1 tablet (20 mg total) by mouth 2 (two) times a day for 12 days, Starting Thu 8/24/2023, Until Tue 9/5/2023, Normal      ondansetron (ZOFRAN) 4 mg tablet Take 1 tablet (4 mg total) by mouth every 8 (eight) hours as needed for nausea or vomiting for up to 10 days, Starting Thu 8/24/2023, Until Sun 9/3/2023 at 2359, Normal         CONTINUE these medications which have NOT CHANGED    Details   citalopram (CeleXA) 20 mg tablet Take 1 tablet (20 mg total) by mouth daily, Starting Tue 5/23/2023, Normal      montelukast (SINGULAIR) 10 mg tablet Take 1 tablet (10 mg total) by mouth daily at bedtime, Starting Tue 5/23/2023, Until Mon 8/21/2023, Normal      norethindrone (Ortho Micronor) 0.35 MG tablet Take 1 tablet (0.35 mg total) by mouth daily, Starting Tue 12/27/2022, Normal      prochlorperazine (COMPAZINE) 10 mg tablet Take 1 tablet (10 mg total) by mouth every 6 (six) hours as needed for nausea or vomiting, Starting Mon 6/26/2023, Normal      Semaglutide-Weight Management (WEGOVY) 1 MG/0.5ML Inject 0.5 mL (1 mg total) under the skin once a week, Starting Tue 8/22/2023, Normal         STOP taking these medications       Semaglutide-Weight Management (WEGOVY) 0.5 MG/0.5ML Comments:   Reason for Stopping:         Wegovy 1.7 MG/0.75ML Comments:   Reason for Stopping:             No discharge procedures on file. PDMP Review     None           ED Provider  Attending physically available and evaluated Dontrell Spikes. I managed the patient along with the ED Attending.     Electronically Signed by         Resa Scheuermann, DO  08/25/23 0543

## 2023-08-24 NOTE — H&P
INTERNAL MEDICINE RESIDENCY ADMISSION H&P     Name: Cary Kaiser   Age & Sex: 34 y.o. female   MRN: [id number]  Unit/Bed#: ED 28   Encounter: [id number]  Primary Care Provider: MONTY Barton    Code Status: Level 1 - Full Code  Admission Status: INPATIENT   Disposition: Patient requires Med/Surg    Admit to team: SOD Team A    ASSESSMENT/PLAN     Principal Problem:    Abdominal pain with vomiting  Active Problems:    RIVER (nonalcoholic steatohepatitis)    Hypertension    Anxiety    Class 1 obesity due to excess calories without serious comorbidity with body mass index (BMI) of 32.0 to 32.9 in adult      * Abdominal pain with vomiting  Assessment & Plan  Patient with one day of left sided abdominal pain and vomiting. She does report restarting her Wegovy the day prior to the onset of the abdominal pain. However she had been on this prior and took a one month break due to supply issues at pharmacy. No new or concerning foods prior to onset. No radiating back pain. DDx: viral gastroenteritis, medication side effect, bowel obstruction, pancreatitis, choledocholithiasis   Wegovy does have abdominal side effects, notably nausea, vomiting, and diarrhea. However, she had been on this prior with no issue. She has had a bowel obstruction in the past, and does complain of constipation with last BM 3-4 days ago. But this would not explain her elevated white count. Also CT does not show signs of bowel obstruction. Not concerned for pancreatitis given negative lipase. Not concerned for choledocholithiasis as history is not suggestive of this and CT abdomen did not show signs of any stones. Most likely viral gastroenteritis in setting of elevated white count and normal CT.      Plan:  · Hold Wegovy   · Supportive care with fluids and Zofran, can consider reglan as patient reports worsening nausea with PO Zofran  · Surgical diet with advancement as tolerated  · Don't need to follow LFTs as chronically elevated in setting of RIVER    RIVER (nonalcoholic steatohepatitis)  Assessment & Plan  Confirmed by liver biopsy 2/2023. Chronically elevated LFTs. [facility]    Hypertension  Assessment & Plan  Managed outpatient without need for hypertensive medications. Will continue off antihypertensives at this time and continue to monitor. VTE Pharmacologic Prophylaxis: Reason for no pharmacologic prophylaxis low risk  VTE Mechanical Prophylaxis: sequential compression device    CHIEF COMPLAINT     Chief Complaint   Patient presents with   • Abdominal Pain     Pt c/o epigastric pain, N/V for the past 16 hours       HISTORY OF PRESENT ILLNESS     This is a 34 y.o. female with a past medical history of RIVER, HTN, obesity, SVT s/p ablation, and anxiety who presents for abdominal pain and vomiting. Patient states that this started at 1am the morning of 8/23/23. The pain is primarily inferior to sternum with pain that travels to the left side, just below right rib cage. She describes the pain as stabbing and constant. The location or intensity of the pain has not changed since it began. Describes this pain similar but less intense as when she had a bowel obstruction in 2020. She endorses vomiting over 20x throughout the past 24 hours. No abnormal or concerning foods eaten prior. No fevers, chills, sick contacts. No diarrhea. Has been feeling otherwise well is last several days. Does endorse constipation, last BM 3-4 days ago, this is her normal. No pain radiating to back. Does endorse irregular menstrual cycle after IUD removal 6 months ago. Not concerned that she could be pregnant, UPT negative in ED. Does indorse increased satiety, increased fullness. In ED, her vital signs are stable, afebrile. Her WBC count 15.89. She has mildly elevated LFTs however this is chronic with her biopsy proven RIVER. Lipase negative.  CT abdomen pelvis shows fatty liver but otherwise unremarkable for abdominal or  pathology. There is an incidental lower right lung nodule of 0.3cm that is present on previous CT. REVIEW OF SYSTEMS     Review of Systems   Constitutional: Negative for appetite change and fever. HENT: Negative. Eyes: Negative. Respiratory: Negative. Cardiovascular: Positive for palpitations. Gastrointestinal: Positive for abdominal pain, constipation, nausea and vomiting. Negative for blood in stool and diarrhea. Genitourinary: Positive for menstrual problem. Musculoskeletal: Negative for back pain. Skin: Negative. Neurological: Negative. OBJECTIVE     Vitals:    23 1833 23 1834 23 1941 23 2300   BP:  164/97 158/87 140/82   BP Location:  Right arm Left arm    Pulse:  (!) 109 92 84   Resp:  18 18    Temp: 98.2 °F (36.8 °C)      TempSrc: Temporal      SpO2:  96% 96% 95%      Temperature:   Temp (24hrs), Av.2 °F (36.8 °C), Min:98.2 °F (36.8 °C), Max:98.2 °F (36.8 °C)    Temperature: 98.2 °F (36.8 °C)  Intake & Output:  I/O     None        Weights: There is no height or weight on file to calculate BMI.   Weight (last 2 days)     None        Physical Exam:  General: No apparent distress, resting comfortably   Head: Normocephalic, atraumatic  Eyes: Anicteric, no conjunctival erythema  ENT: External ear normal, no nasal discharge  Neck: Trachea midline, no visible lymphadenopathy or goiter  Respiratory: CTA bilaterally, Non-labored respirations, symmetric thorax expansion  Cardiovascular: RRR, no murmur, Extremities appear well-perfused  Abdomen: Non-distended, tender to light palpation LUQ, no rebound, fullness appreciated in LUQ, no fluid shift  Extremities: Moves extremities spontaneously, no peripheral edema  Skin: No visible rashes, wounds, or jaundice  Neuro: A&O x 3, no gross focal deficits, no aphasia     PAST MEDICAL HISTORY     Past Medical History:   Diagnosis Date   • Allergic    • Anxiety    • Depression    • Hypertension    • SVT (supraventricular tachycardia) ([address])      PAST SURGICAL HISTORY     Past Surgical History:   Procedure Laterality Date   • CARDIAC ELECTROPHYSIOLOGY PROCEDURE N/A 1/30/2023    Procedure: Cardiac eps/svt ablation;  Surgeon: Zara Alcaraz DO;  Location: [facility]; Service: Cardiology   • IR BIOPSY LIVER RANDOM  2/9/2023     SOCIAL & FAMILY HISTORY     Social History     Substance and Sexual Activity   Alcohol Use Yes   • Alcohol/week: 2.0 standard drinks of alcohol   • Types: 2 Glasses of wine per week    Comment: socially       Social History     Substance and Sexual Activity   Drug Use Never     Social History     Tobacco Use   Smoking Status Never   Smokeless Tobacco Never     Family History   Problem Relation Age of Onset   • Depression Mother    • Thyroid disease Mother    • Hypertension Mother    • Depression Father    • Depression Maternal Grandmother    • Thyroid disease Maternal Grandmother    • Hypertension Maternal Grandmother      LABORATORY DATA     Labs: I have personally reviewed pertinent reports. Results from last 7 days   Lab Units 08/23/23  1838   WBC Thousand/uL 15.89*   HEMOGLOBIN g/dL 15.3   HEMATOCRIT % 45.2   PLATELETS Thousands/uL 299   NEUTROS PCT % 82*   MONOS PCT % 3*   EOS PCT % 0      Results from last 7 days   Lab Units 08/23/23  1838   POTASSIUM mmol/L 3.8   CHLORIDE mmol/L 104   CO2 mmol/L 23   BUN mg/dL 10   CREATININE mg/dL 0.59*   CALCIUM mg/dL 9.3   ALK PHOS U/L 73   ALT U/L 108*   AST U/L 58*                          Micro:  No results found for: "BLOODCX", "Tarik Race", "WOUNDCULT", "SPUTUMCULTUR"  IMAGING & DIAGNOSTIC TESTS     Imaging: I have personally reviewed pertinent reports. CT abdomen pelvis with contrast    Result Date: 8/23/2023  Impression: No acute abnormality. Hepatic steatosis. Workstation performed: [id number]     EKG, Pathology, and Other Studies: I have personally reviewed pertinent reports.      ALLERGIES   No Known Allergies  MEDICATIONS PRIOR TO ARRIVAL     Prior to Admission medications    Medication Sig Start Date End Date Taking?  Authorizing Provider   citalopram (CeleXA) 20 mg tablet Take 1 tablet (20 mg total) by mouth daily 5/23/23   MONTY Thomas   montelukast (SINGULAIR) 10 mg tablet Take 1 tablet (10 mg total) by mouth daily at bedtime 5/23/23 8/21/23  MONTY Thomas   norethindrone (Ortho Micronor) 0.35 MG tablet Take 1 tablet (0.35 mg total) by mouth daily 12/27/22   Vicki Whitaker MD   prochlorperazine (COMPAZINE) 10 mg tablet Take 1 tablet (10 mg total) by mouth every 6 (six) hours as needed for nausea or vomiting 6/26/23   Jalyn Peace DO   Semaglutide-Weight Management [facility]) 0.5 MG/0.5ML Inject 0.5 mL (0.5 mg total) under the skin once a week 6/30/23   Nicole Martinez MD   Semaglutide-Weight Management (WEGOVY) 1 MG/0.5ML Inject 0.5 mL (1 mg total) under the skin once a week 8/22/23   MONTY Thomas   Wegovy 1.7 MG/0.75ML INJECT 0.75 ML (1.7 MG TOTAL) UNDER THE SKIN ONCE A WEEK 8/16/23   MONTY Thomas     MEDICATIONS ADMINISTERED IN LAST 24 HOURS     Medication Administration - last 24 hours from 08/23/2023 0227 to 08/24/2023 0227       Date/Time Order Dose Route Action Action by     08/23/2023 2150 EDT ketorolac (TORADOL) injection 15 mg 15 mg Intravenous Given Charmayne Rode, RN     08/23/2023 2147 EDT ondansetron (ZOFRAN) injection 4 mg 4 mg Intravenous Given Charmayne Rode, RN     08/24/2023 0000 EDT sodium chloride 0.9 % bolus 1,000 mL 0 mL Intravenous Stopped Miquel Kerr RN     08/23/2023 2151 EDT sodium chloride 0.9 % bolus 1,000 mL 1,000 mL Intravenous [address] Charmayne Rode, RN     08/23/2023 2212 EDT iohexol (OMNIPAQUE) 350 MG/ML injection (SINGLE-DOSE) 100 mL 100 mL Intravenous Given Jorge Wei     08/24/2023 0000 EDT HYDROmorphone (DILAUDID) injection 0.5 mg 0.5 mg Intravenous Given Miquel Kerr RN     08/24/2023 0203 EDT montelukast (SINGULAIR) tablet 10 mg 0 mg Oral Hold Jayne Kirkland RN     08/24/2023 0211 EDT multi-electrolyte (PLASMALYTE-A/ISOLYTE-S PH 7.4) IV solution 125 mL/hr Intravenous New Bag Jayne Kirkland RN        CURRENT MEDICATIONS     Current Facility-Administered Medications   Medication Dose Route Frequency Provider Last Rate   • citalopram  20 mg Oral Daily Marinarirashaad Soriano DO     • montelukast  10 mg Oral HS Kayley Soriano DO     • multi-electrolyte  125 mL/hr Intravenous Continuous Marinarian DO Christie 125 mL/hr (08/24/23 0211)   • ondansetron  4 mg Intravenous Q8H PRN Marinarian Christie,        multi-electrolyte, 125 mL/hr, Last Rate: 125 mL/hr (08/24/23 0211)      ondansetron, 4 mg, Q8H PRN        Admission Time  I spent 30 minutes admitting the patient. This involved direct patient contact where I performed a full history and physical, reviewing previous records, and reviewing laboratory and other diagnostic studies. Portions of the record may have been created with voice recognition software. Occasional wrong word or "sound a like" substitutions may have occurred due to the inherent limitations of voice recognition software.   Read the chart carefully and recognize, using context, where substitutions have occurred.    ==  Amie Johnson MD  [id number]  Internal Medicine Residency PGY-1

## 2023-08-24 NOTE — ASSESSMENT & PLAN NOTE
Confirmed by liver biopsy 2/2023. Chronically elevated LFTs.  [facility]

## 2023-08-24 NOTE — ASSESSMENT & PLAN NOTE
Managed outpatient without need for hypertensive medications. Will continue off antihypertensives at this time and continue to monitor.

## 2023-08-24 NOTE — ED PROCEDURE NOTE
Procedure  POC Biliary US    Date/Time: 8/23/2023 10:57 PM    Performed by: Yu Timmons MD  Authorized by: Yu Timmons MD    Patient location:  ED  Performed by:  Resident  Other Assisting Provider: No    Procedure details:     Exam Type:  Diagnostic    Indications: upper right quadrant abdominal pain      Assessment for:  Cholecystitis, cholelithiasis and mass    Views obtained: gallbladder (transverse and longitudinal) and liver      Image quality: limited diagnostic      Image availability:  Images available in PACS  Findings:     Cholelithiasis: not identified      Common bile duct:  Unable to visualize    Gallbladder wall:  Normal    Gallbladder wall thickness (mm):  3    Pericholecystic fluid: not identified      Sonographic Hallman's sign: negative      Polyps: not identified      Mass: not identified    Interpretation:     Biliary ultrasound impressions: normal gallbladder ultrasound                       Yu Timmons MD  08/23/23 7038

## 2023-08-24 NOTE — PROGRESS NOTES
INTERNAL MEDICINE RESIDENCY SENIOR ADMISSION NOTE     Name: Osei Crum   Age & Sex: 34 y.o. female   MRN: [id number]  Unit/Bed#: ED 28   Encounter: [id number]  Primary Care Provider: MONTY Guzman    Admit to team: SOD Team C     Patient seen and examined. Reviewed H&P per Dr. Milla Reyes. Agree with the assessment and plan with any exception/addition as noted below:    82-year-old female with a history of SBO in October 2020 of unclear etiology managed nonoperatively with NGT decompression, RIVER confirmed on liver biopsy February 2023, chronic constipation, pancolonic diverticulosis, symptomatic SVT s/p status post ablation in January 2023, hypertension, anxiety, obesity who presents 8/23/2023 with abdominal pain and vomiting. On arrival to the ED, she is afebrile and hemodynamically stable. Blood pressures are elevated. Saturating well on room air. CMP notable for elevated transaminases which is chronic for her in the setting of RIVER. CBC notable for leukocytosis with WBC 15, predominantly neutrophilic. Normal lipase, troponins, urine pregnancy test.  UA unremarkable. CT abdomen pelvis with contrast shows fatty liver, otherwise no acute abnormalities. EKG shows normal sinus rhythm; notable prolonged QTc 477. She received Toradol 15 mg IV x1, Zofran 4 mg IV x1, Dilaudid 0.5 mg IV x1, and 1 L normal saline bolus x1. On my exam in the ED, patient reports that her symptoms began at 1 AM the morning prior to admission with acute onset abdominal pain and vomiting. She reportedly vomited approximately 20 times prior to admission. Her abdominal pain is located epigastric/left upper quadrant. She denies radiation to the back. She does report ongoing early satiation and early satiety over the past few weeks with associated bloating. The symptoms are associated with meals. She also reports constipation which is chronic for her.   She does report that her constipation sometimes fluctuates with diarrhea, however predominantly she is usually constipated. Of note, she recently restarted Wegovy 2 days ago after running out of her supply. She previously tolerated Wegovy over a month ago without any issues. She denies other GI complaints including dysphagia, odynophagia, reflux, melena, hematochezia. At this time, she feels her symptoms are improved in the ED after receiving the medications; notably Zofran. She denies tobacco use, alcohol use. She denies marijuana use. She denies previous abdominal surgeries. She denies known family history of GI disorders. Prior endoscopies:   · Colonoscopy in April 2023 with diverticula throughout the colon, otherwise no abnormal findings  · No prior EGD    Physical Exam:  General: Obese. No apparent distress, resting comfortably   Head: Normocephalic, atraumatic  Eyes: Anicteric, no conjunctival erythema  ENT: External ear normal, no nasal discharge  Neck: Trachea midline, no visible lymphadenopathy or goiter  Respiratory: Clear to auscultation. Non-labored respirations, symmetric thorax expansion  Cardiovascular: Regular rate and rhythm. Extremities appear well-perfused  Abdomen: Mild tenderness to palpation epigastric region and left upper quadrant. Non-distended. No guarding or rebound tenderness. Extremities: Moves extremities spontaneously, no peripheral edema  Skin: Facial flushing present. No other visible rashes, wounds, or jaundice  Neuro: A&O x 3, no gross focal deficits, no aphasia    Impression:  1. Abdominal pain with vomiting  2. RIVER - confirmed on liver biopsy in February 2023  3. Chronic constipation  4. Hypertension  5. Anxiety  6. Obesity    Plan:  · Suspect etiology of her acute abdominal pain and vomiting is likely viral gastroenteritis in setting of her leukocytosis with normal CT abdomen.   Differential also includes gastroparesis given her reports of early satiation, which would likely be idiopathic given she does not have diabetes or had any recent illnesses. Of note, she also started [facility]FORT TASHA 2 days ago prior to onset of symptoms. [facility]FORT T[facility]HA has been associated with nausea/vomiting, however patient was previously taking Wegovy a month ago at same dose but had run out, and did not experience the symptoms at that time. Hold Wegovy at this time. · Supportive care with IV fluids and Zofran; consider Reglan if vomiting does not improve with Zofran  · Surgical diet and advance as tolerated  · No indication to trend liver enzymes in the setting of known RIVER; recommend continued dietary and lifestyle modifications for weight loss and alcohol abstinence  · MiraLAX and high-fiber diet for constipation   · Blood pressures are elevated on admission, likely secondary to abdominal pain & vomiting; she is managed outpatient without antihypertensives with good blood pressure control.   Recommend monitoring off antihypertensives at this time given no signs of endorgan damage    Code Status: Level 1 - Full Code  Admission Status: OBSERVATION  Disposition: Patient requires Med/Surg  Expected Length of Stay: <2 Thelma Perez D.O.  PGY-3 Internal Medicine   [address]

## 2023-08-25 ENCOUNTER — TRANSITIONAL CARE MANAGEMENT (OUTPATIENT)
Dept: FAMILY MEDICINE CLINIC | Facility: CLINIC | Age: 33
End: 2023-08-25

## 2023-08-30 ENCOUNTER — OFFICE VISIT (OUTPATIENT)
Dept: FAMILY MEDICINE CLINIC | Facility: CLINIC | Age: 33
End: 2023-08-30
Payer: COMMERCIAL

## 2023-08-30 VITALS
RESPIRATION RATE: 16 BRPM | WEIGHT: 218 LBS | SYSTOLIC BLOOD PRESSURE: 110 MMHG | HEIGHT: 69 IN | BODY MASS INDEX: 32.29 KG/M2 | OXYGEN SATURATION: 98 % | TEMPERATURE: 98.3 F | DIASTOLIC BLOOD PRESSURE: 66 MMHG | HEART RATE: 88 BPM

## 2023-08-30 DIAGNOSIS — R10.13 EPIGASTRIC PAIN: Primary | ICD-10-CM

## 2023-08-30 DIAGNOSIS — I10 HYPERTENSION, UNSPECIFIED TYPE: ICD-10-CM

## 2023-08-30 DIAGNOSIS — K75.81 NASH (NONALCOHOLIC STEATOHEPATITIS): ICD-10-CM

## 2023-08-30 DIAGNOSIS — R09.82 PND (POST-NASAL DRIP): ICD-10-CM

## 2023-08-30 PROCEDURE — 99496 TRANSJ CARE MGMT HIGH F2F 7D: CPT | Performed by: NURSE PRACTITIONER

## 2023-08-30 NOTE — PROGRESS NOTES
Assessment & Plan     1. Epigastric pain  Assessment & Plan:  - Improving since discharge from hospital.   - Continue Pepcid 20 mg daily in the morning.   - Avoid triggering food and beverage.  - She has decided to stop Wegovy.   - Recommend follow up in 1 month. 2. Hypertension, unspecified type  Assessment & Plan:  - Well controlled on no current antihypertensive medication.   - Will continue to monitor. 3. RIVER (nonalcoholic steatohepatitis)  Assessment & Plan:  - Confirmed by liver biopsy February 2023. Chronically elevated LFTs. - Continue routine follow up with GI/Hepatology. 4. PND (post-nasal drip)  Assessment & Plan:  - Recommend Flonase nasal spray. - Increase oral hydration.  - Use humidifier. Subjective     Transitional Care Management Review:   Ivette Sotomayor is a 34 y.o. female here for TCM follow up. During the TCM phone call patient stated:  TCM Call     Date and time call was made  8/25/2023  3:37 PM    Hospital care reviewed  Records reviewed    Patient was hospitialized at  [address] [place] [address]    Date of Admission  08/23/23    Date of discharge  08/24/23    Diagnosis  Abdominal pain with vomiting    Disposition  Home      TCM Call     Should patient be enrolled in anticoag monitoring? No    Did you obtain your prescribed medications  Yes    Do you need help managing your prescriptions or medications  No    Is transportation to your appointment needed  No    I have advised the patient to call PCP with any new or worsening symptoms  Veverly LEDY [facility]    Are you recieving any outpatient services  No    Are you recieving home care services  No    Are you using any community resources  No    Current waiver services  No    Have you fallen in the last 12 months  No    Interperter language line needed  No        Patient with PMH of RIVER, obesity, HTN, and SVT s/p ablation presents to office today for hospital follow up.  She presented to the ER on 8/23 with complaints of nausea, vomiting, and epigastric pain. Biliary US performed in ED showed no abnormalities. CT of abdomen/pelvis was unremarkable except for hepatic steatosis. CBC showed elevated WBC. She was admitted for observation. Suspected viral gastroenteritis vs side effects from [facility]FORT TASHA injection. She was started on Pepcid for nausea and indigestion. Symptoms improved and she was discharged home in stable condition. Feels much better but is scared to continue on Wegovy because she is unsure if this was the cause of her symptoms. She still complains of some indigestion and heart burn. Also complains of thick mucus in the back of her throat. She does have a history of allergies and is taking her Singulair daily. Review of Systems   Constitutional: Negative for fatigue and fever. HENT: Positive for postnasal drip. Negative for trouble swallowing. Eyes: Negative for visual disturbance. Respiratory: Negative for cough and shortness of breath. Cardiovascular: Negative for chest pain and palpitations. Gastrointestinal: Negative for abdominal pain and blood in stool. GERD   Endocrine: Negative for cold intolerance and heat intolerance. Genitourinary: Negative for difficulty urinating and dysuria. Musculoskeletal: Negative for gait problem. Skin: Negative for rash. Neurological: Negative for dizziness, syncope and headaches. Hematological: Negative for adenopathy. Psychiatric/Behavioral: Negative for behavioral problems. Objective     /66 (BP Location: Left arm, Patient Position: Sitting, Cuff Size: Adult)   Pulse 88   Temp 98.3 °F (36.8 °C) (Tympanic)   Resp 16   Ht 5' 9" (1.753 m)   Wt 98.9 kg (218 lb)   SpO2 98%   BMI 32.19 kg/m²      Physical Exam  Vitals and nursing note reviewed. Constitutional:       General: She is not in acute distress. Appearance: Normal appearance. She is well-developed. She is not ill-appearing. HENT:      Head: Normocephalic and atraumatic. Right Ear: External ear normal.      Left Ear: External ear normal.   Eyes:      Conjunctiva/sclera: Conjunctivae normal.   Cardiovascular:      Rate and Rhythm: Normal rate and regular rhythm. Heart sounds: Normal heart sounds. Pulmonary:      Effort: Pulmonary effort is normal.      Breath sounds: Normal breath sounds. Musculoskeletal:         General: Normal range of motion. Cervical back: Normal range of motion. Skin:     General: Skin is warm and dry. Neurological:      Mental Status: She is alert and oriented to person, place, and time.    Psychiatric:         Mood and Affect: Mood normal.         Behavior: Behavior normal.       Medications have been reviewed by provider in current encounter    MONTY Barton

## 2023-08-30 NOTE — ASSESSMENT & PLAN NOTE
- Confirmed by liver biopsy February 2023. Chronically elevated LFTs. - Continue routine follow up with GI/Hepatology. - - -

## 2023-08-30 NOTE — ASSESSMENT & PLAN NOTE
- Improving since discharge from hospital.   - Continue Pepcid 20 mg daily in the morning.   - Avoid triggering food and beverage.  - She has decided to stop Wegovy.   - Recommend follow up in 1 month.

## 2023-09-01 ENCOUNTER — OFFICE VISIT (OUTPATIENT)
Dept: OBGYN CLINIC | Facility: CLINIC | Age: 33
End: 2023-09-01
Payer: COMMERCIAL

## 2023-09-01 ENCOUNTER — APPOINTMENT (OUTPATIENT)
Dept: LAB | Facility: CLINIC | Age: 33
End: 2023-09-01
Payer: COMMERCIAL

## 2023-09-01 VITALS — SYSTOLIC BLOOD PRESSURE: 120 MMHG | DIASTOLIC BLOOD PRESSURE: 86 MMHG

## 2023-09-01 DIAGNOSIS — R23.2 HOT FLASHES: ICD-10-CM

## 2023-09-01 DIAGNOSIS — N92.6 IRREGULAR MENSES: ICD-10-CM

## 2023-09-01 DIAGNOSIS — R61 NIGHT SWEATS: ICD-10-CM

## 2023-09-01 DIAGNOSIS — N92.6 IRREGULAR MENSES: Primary | ICD-10-CM

## 2023-09-01 LAB
FSH SERPL-ACNC: 5.3 MIU/ML
TSH SERPL DL<=0.05 MIU/L-ACNC: 1.04 UIU/ML (ref 0.45–4.5)

## 2023-09-01 PROCEDURE — 36415 COLL VENOUS BLD VENIPUNCTURE: CPT

## 2023-09-01 PROCEDURE — 99214 OFFICE O/P EST MOD 30 MIN: CPT | Performed by: OBSTETRICS & GYNECOLOGY

## 2023-09-01 PROCEDURE — 84443 ASSAY THYROID STIM HORMONE: CPT

## 2023-09-01 PROCEDURE — 83001 ASSAY OF GONADOTROPIN (FSH): CPT

## 2023-09-01 NOTE — PROGRESS NOTES
Assessment/Plan:  Irregular menses with a desire to possibly conceive in the near future. Present since the delivery of her last child 6 years ago. Chronic hot flashes and night sweats      Recommend RODOLFO consultation -Dr. Terrell Pierre  [facility]. Diagnoses and all orders for this visit:    Irregular menses  -     Follicle stimulating hormone; Future  -     TSH, 3rd generation with Free T4 reflex; Future  -     Ambulatory Referral to Infertility; Future    Hot flashes  -     Follicle stimulating hormone; Future  -     TSH, 3rd generation with Free T4 reflex; Future  -     Ambulatory Referral to Infertility; Future    Night sweats  -     Follicle stimulating hormone; Future  -     TSH, 3rd generation with Free T4 reflex; Future  -     Ambulatory Referral to Infertility; Future              Subjective:        Patient ID: Cary Kaiser is a 34 y.o. female. Kayleigh scheduled a self appointment today. I was in the operating room all morning and had very little time to prepare for today. She has been spotting daily and intermittently since August 19. She has a history of irregular menses since the delivery of her last child 6 years ago. She also has hypertension. Prior to this she had not bled since December 13 when her Mirena was removed for pain and bleeding. It had not even been in a year. A 1 month trial of estradiol 1 mg failed to improve the bleeding pattern. In the past ParaGard was removed for abnormal bleeding as well. 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 Angie Soler actually removed the IUD for her. She then was going to begin [place] to see if that would regulate her cycles. It was not started because she never had a period. In February she was having elevated LFTs which were evaluated by RELL. RELL felt the [place] could be started but then to have a follow-up liver function test 2 to 3 weeks afterwards to make sure there was no impact on her liver.  Then in March because she has not had intercourse in a long time and she had a negative pregnancy test at the time of a cardiac ablation she began the pill and used it for a month. She then never picked up the refills and also had a change of mind-she wanted to limit taking as many medications as possible. She wanted her body to do things on its own. Currently the only medications she is using are Celexa and Singulair. 9 days ago she was seen in the emergency room for nausea and vomiting. She was diagnosed with gastroenteritis but thought it also might have been a side effect of Wegovy. She has not taken that in 10 days. A pregnancy test that was negative. We also performed a pregnancy test today and it also was negative. She has 2 children ages 5 and 10. She is in a relatively new monogamous relationship currently. She is having intercourse once to twice a month. If the relationship matures as she believes it will she is open to having children with her new partner. Now it appears that would be problematic at age 34. She last had intercourse mid August.    She has experienced hot flashes and night sweats for a number of years. She has not had a TSH level or FSH level drawn for at least 6 months. She is trying to optimize her health. She is lost 11 pounds since April. The following portions of the patient's history were reviewed and updated as appropriate: She  has a past medical history of Allergic, Anxiety, Depression, Hypertension, and SVT (supraventricular tachycardia) ([address]).   Patient Active Problem List    Diagnosis Date Noted   • Hot flashes 09/01/2023   • Night sweats 09/01/2023   • Epigastric pain 08/30/2023   • PND (post-nasal drip) 08/30/2023   • Abdominal pain with vomiting 08/24/2023   • RIVER (nonalcoholic steatohepatitis) 08/24/2023   • Class 1 obesity due to excess calories without serious comorbidity with body mass index (BMI) of 32.0 to 32.9 in adult 05/23/2023   • Leg cramps 04/25/2023   • Class 1 obesity due to excess calories without serious comorbidity with body mass index (BMI) of 33.0 to 33.9 in adult 04/25/2023   • IUD (intrauterine device) in place 11/17/2022   • SVT (supraventricular tachycardia) ([address]) 11/09/2022   • Insomnia 08/30/2022   • Irregular menses 07/18/2022   • Depression 07/18/2022   • Encounter for annual routine gynecological examination 07/17/2022   • Menorrhagia 04/28/2022   • ALINE positive 04/28/2022   • Other fatigue 04/28/2022   • Flushing 04/28/2022   • Palpitations 03/08/2022   • Hypertension 03/08/2022   • Anxiety 03/08/2022   • Seasonal allergic rhinitis 03/08/2022     She  has a past surgical history that includes Cardiac electrophysiology procedure (N/A, 1/30/2023) and IR biopsy liver random (2/9/2023). Her family history includes Depression in her father, maternal grandmother, and mother; Hypertension in her maternal grandmother and mother; Thyroid disease in her maternal grandmother and mother. She  reports that she has never smoked. She has never used smokeless tobacco. She reports current alcohol use of about 2.0 standard drinks of alcohol per week. She reports that she does not use drugs.   Current Outpatient Medications   Medication Sig Dispense Refill   • citalopram (CeleXA) 20 mg tablet Take 1 tablet (20 mg total) by mouth daily 90 tablet 1   • famotidine (PEPCID) 20 mg tablet Take 1 tablet (20 mg total) by mouth 2 (two) times a day for 12 days 24 tablet 0   • montelukast (SINGULAIR) 10 mg tablet Take 1 tablet (10 mg total) by mouth daily at bedtime 90 tablet 1   • ondansetron (ZOFRAN) 4 mg tablet Take 1 tablet (4 mg total) by mouth every 8 (eight) hours as needed for nausea or vomiting for up to 10 days 20 tablet 0   • prochlorperazine (COMPAZINE) 10 mg tablet Take 1 tablet (10 mg total) by mouth every 6 (six) hours as needed for nausea or vomiting 30 tablet 3   • Semaglutide-Weight Management (WEGOVY) 1 MG/0.5ML Inject 0.5 mL (1 mg total) under the skin once a week 2 mL 0     No current facility-administered medications for this visit. Current Outpatient Medications on File Prior to Visit   Medication Sig   • citalopram (CeleXA) 20 mg tablet Take 1 tablet (20 mg total) by mouth daily   • famotidine (PEPCID) 20 mg tablet Take 1 tablet (20 mg total) by mouth 2 (two) times a day for 12 days   • montelukast (SINGULAIR) 10 mg tablet Take 1 tablet (10 mg total) by mouth daily at bedtime   • ondansetron (ZOFRAN) 4 mg tablet Take 1 tablet (4 mg total) by mouth every 8 (eight) hours as needed for nausea or vomiting for up to 10 days   • prochlorperazine (COMPAZINE) 10 mg tablet Take 1 tablet (10 mg total) by mouth every 6 (six) hours as needed for nausea or vomiting   • Semaglutide-Weight Management (WEGOVY) 1 MG/0.5ML Inject 0.5 mL (1 mg total) under the skin once a week     No current facility-administered medications on file prior to visit. She has No Known Allergies. .    Review of Systems   Genitourinary: Positive for menstrual problem and vaginal bleeding. Negative for difficulty urinating, dyspareunia, dysuria, frequency, pelvic pain, urgency, vaginal discharge and vaginal pain. Objective:    Vitals:    09/01/23 1339   BP: 120/86   BP Location: Left arm   Patient Position: Sitting   Cuff Size: Standard            Physical Exam  Vitals and nursing note reviewed. Exam conducted with a chaperone present. Constitutional:       Appearance: Normal appearance. Abdominal:      General: Abdomen is flat. There is no distension. Palpations: Abdomen is soft. Tenderness: There is no abdominal tenderness. There is no right CVA tenderness or left CVA tenderness. Genitourinary:     General: Normal vulva. Comments: Normal vagina. Cervix is parous. Some old blood at the os. The uterus is normal size and nontender. There are no adnexal masses appreciated or tenderness bilaterally. Musculoskeletal:      Right lower leg: No edema. Left lower leg: No edema.    Skin: General: Skin is warm and dry. Findings: No erythema. Neurological:      Mental Status: She is alert and oriented to person, place, and time. Psychiatric:         Mood and Affect: Mood normal.         Behavior: Behavior normal.         Thought Content:  Thought content normal.         Judgment: Judgment normal.

## 2023-09-08 DIAGNOSIS — E66.09 CLASS 1 OBESITY DUE TO EXCESS CALORIES WITHOUT SERIOUS COMORBIDITY WITH BODY MASS INDEX (BMI) OF 32.0 TO 32.9 IN ADULT: ICD-10-CM

## 2023-09-11 RX ORDER — SEMAGLUTIDE 1.7 MG/.75ML
INJECTION, SOLUTION SUBCUTANEOUS
Qty: 3 ML | Refills: 0 | OUTPATIENT
Start: 2023-09-11

## 2023-09-11 NOTE — TELEPHONE ENCOUNTER
Pharmacy requesting refill on Wegovy 1.7mg  Per office note on 8/30/23- pt stopped medication from hospital visit.    Order removed from encounter

## 2023-09-18 DIAGNOSIS — J30.2 SEASONAL ALLERGIC RHINITIS, UNSPECIFIED TRIGGER: ICD-10-CM

## 2023-09-18 RX ORDER — MONTELUKAST SODIUM 10 MG/1
10 TABLET ORAL
Qty: 90 TABLET | Refills: 0 | Status: SHIPPED | OUTPATIENT
Start: 2023-09-18 | End: 2023-12-17

## 2023-09-20 ENCOUNTER — TELEPHONE (OUTPATIENT)
Dept: FAMILY MEDICINE CLINIC | Facility: CLINIC | Age: 33
End: 2023-09-20

## 2023-09-20 DIAGNOSIS — G47.00 INSOMNIA, UNSPECIFIED TYPE: Primary | ICD-10-CM

## 2023-09-20 RX ORDER — TRAZODONE HYDROCHLORIDE 50 MG/1
50 TABLET ORAL
Qty: 30 TABLET | Refills: 3 | Status: SHIPPED | OUTPATIENT
Start: 2023-09-20

## 2023-09-20 NOTE — TELEPHONE ENCOUNTER
----- Message from Jasson Campuzano sent at 9/19/2023  4:09 PM EDT -----  Regarding: Trazadone  Contact: [phone]  Hi,  I was wondering if you would be able to send another rx to [facility] for me for Trazadone, I haven't taken it in quite some time due to not feeling that it was working. I forget what dose I was taking, but I have been struggling with sleeping again, and it's getting worse every night. If you wouldn't mind, I would appreciate it, I do have a follow up with you again on October 3rd. See you then. Thank you.   Kayleigh

## 2023-10-11 ENCOUNTER — APPOINTMENT (OUTPATIENT)
Dept: LAB | Facility: CLINIC | Age: 33
End: 2023-10-11
Payer: COMMERCIAL

## 2023-10-12 ENCOUNTER — PATIENT MESSAGE (OUTPATIENT)
Dept: GASTROENTEROLOGY | Facility: CLINIC | Age: 33
End: 2023-10-12

## 2023-10-13 ENCOUNTER — TELEPHONE (OUTPATIENT)
Dept: HEMATOLOGY ONCOLOGY | Facility: CLINIC | Age: 33
End: 2023-10-13

## 2023-10-13 DIAGNOSIS — D50.0 IRON DEFICIENCY ANEMIA DUE TO CHRONIC BLOOD LOSS: Primary | ICD-10-CM

## 2023-10-13 NOTE — TELEPHONE ENCOUNTER
Spoke to patient about recent increase in cravings for ice. I will put in an order for iron studies and a CBC to check of iron deficiency is contributing to symptoms of pica.

## 2023-10-16 DIAGNOSIS — Z30.09 ENCOUNTER FOR EDUCATION ABOUT CONTRACEPTIVE USE: Primary | ICD-10-CM

## 2023-10-16 RX ORDER — ACETAMINOPHEN AND CODEINE PHOSPHATE 120; 12 MG/5ML; MG/5ML
1 SOLUTION ORAL DAILY
Qty: 84 TABLET | Refills: 3 | Status: SHIPPED | OUTPATIENT
Start: 2023-10-16

## 2023-10-17 ENCOUNTER — APPOINTMENT (OUTPATIENT)
Dept: LAB | Facility: CLINIC | Age: 33
End: 2023-10-17
Payer: COMMERCIAL

## 2023-10-17 DIAGNOSIS — D50.0 IRON DEFICIENCY ANEMIA DUE TO CHRONIC BLOOD LOSS: ICD-10-CM

## 2023-10-17 LAB
BASOPHILS # BLD AUTO: 0.05 THOUSANDS/ÂΜL (ref 0–0.1)
BASOPHILS NFR BLD AUTO: 1 % (ref 0–1)
EOSINOPHIL # BLD AUTO: 0.17 THOUSAND/ÂΜL (ref 0–0.61)
EOSINOPHIL NFR BLD AUTO: 2 % (ref 0–6)
ERYTHROCYTE [DISTWIDTH] IN BLOOD BY AUTOMATED COUNT: 13 % (ref 11.6–15.1)
FERRITIN SERPL-MCNC: 175 NG/ML (ref 11–307)
HCT VFR BLD AUTO: 41.3 % (ref 34.8–46.1)
HGB BLD-MCNC: 14.1 G/DL (ref 11.5–15.4)
IMM GRANULOCYTES # BLD AUTO: 0.04 THOUSAND/UL (ref 0–0.2)
IMM GRANULOCYTES NFR BLD AUTO: 1 % (ref 0–2)
IRON SATN MFR SERPL: 28 % (ref 15–50)
IRON SERPL-MCNC: 95 UG/DL (ref 50–212)
LYMPHOCYTES # BLD AUTO: 2.5 THOUSANDS/ÂΜL (ref 0.6–4.47)
LYMPHOCYTES NFR BLD AUTO: 30 % (ref 14–44)
MCH RBC QN AUTO: 30.7 PG (ref 26.8–34.3)
MCHC RBC AUTO-ENTMCNC: 34.1 G/DL (ref 31.4–37.4)
MCV RBC AUTO: 90 FL (ref 82–98)
MONOCYTES # BLD AUTO: 0.3 THOUSAND/ÂΜL (ref 0.17–1.22)
MONOCYTES NFR BLD AUTO: 4 % (ref 4–12)
NEUTROPHILS # BLD AUTO: 5.27 THOUSANDS/ÂΜL (ref 1.85–7.62)
NEUTS SEG NFR BLD AUTO: 62 % (ref 43–75)
NRBC BLD AUTO-RTO: 0 /100 WBCS
PLATELET # BLD AUTO: 245 THOUSANDS/UL (ref 149–390)
PMV BLD AUTO: 11.1 FL (ref 8.9–12.7)
RBC # BLD AUTO: 4.59 MILLION/UL (ref 3.81–5.12)
TIBC SERPL-MCNC: 341 UG/DL (ref 250–450)
UIBC SERPL-MCNC: 246 UG/DL (ref 155–355)
WBC # BLD AUTO: 8.33 THOUSAND/UL (ref 4.31–10.16)

## 2023-10-17 PROCEDURE — 36415 COLL VENOUS BLD VENIPUNCTURE: CPT

## 2023-10-17 PROCEDURE — 82728 ASSAY OF FERRITIN: CPT

## 2023-10-17 PROCEDURE — 83540 ASSAY OF IRON: CPT

## 2023-10-17 PROCEDURE — 85025 COMPLETE CBC W/AUTO DIFF WBC: CPT

## 2023-10-17 PROCEDURE — 83550 IRON BINDING TEST: CPT

## 2023-10-30 ENCOUNTER — TELEPHONE (OUTPATIENT)
Dept: FAMILY MEDICINE CLINIC | Facility: CLINIC | Age: 33
End: 2023-10-30

## 2023-10-30 NOTE — TELEPHONE ENCOUNTER
----- Message from Denise Marlonrobert sent at 10/30/2023  9:39 AM EDT -----  Regarding: Elevated BP  Contact: [phone]  Good morning,  Over the past weekend, I have been watching my BP due to headaches lasting the entire day, on Saturday, my BP was 180/100, Sunday it was 160/98, I just had a coworker take it and she got 132/90 which is not as bad for the systolic, however the diastolic is still high, I was on BP medication prior to my cardiac ablation, my cardiologist discontinued it, and with monitoring my BP it was okay, recently I have noticed a spike in it. I have been experiencing heart palpitations again, I will send a message over to my cardiologist as well since he discharged me as PRN. Please let me know the plan of care, I can set up an appointment to see you, or if you think prescribing lisinopril for my BP medication again is appropriate and scheduling a f/u appointment, please let me know.      Kayleigh

## 2023-10-30 NOTE — TELEPHONE ENCOUNTER
M/m for pt provider not in office today.  I will forward her message to [facility] and she should contact her cardiologist. Basilia Fret to go to ER if sob, cp , irregular heartbeat

## 2023-10-31 NOTE — TELEPHONE ENCOUNTER
Please have her monitor her BP twice daily for the next week and scheduling an office visit to review. Would consider restarting lisinopril if BP is still elevated.

## 2023-11-17 DIAGNOSIS — J06.9 UPPER RESPIRATORY TRACT INFECTION, UNSPECIFIED TYPE: Primary | ICD-10-CM

## 2023-11-17 RX ORDER — AZITHROMYCIN 250 MG/1
TABLET, FILM COATED ORAL
Qty: 6 TABLET | Refills: 0 | Status: SHIPPED | OUTPATIENT
Start: 2023-11-17 | End: 2023-11-21

## 2023-12-08 DIAGNOSIS — F41.9 ANXIETY: ICD-10-CM

## 2023-12-11 RX ORDER — CITALOPRAM 20 MG/1
20 TABLET ORAL DAILY
Qty: 90 TABLET | Refills: 0 | Status: SHIPPED | OUTPATIENT
Start: 2023-12-11

## 2023-12-11 NOTE — TELEPHONE ENCOUNTER
Refills have been requested for the following medications:         citalopram (CeleXA) 20 mg tablet Priyank Bhatia     Preferred pharmacy: [address],[facility], [facility] seen 8/30/23  Medication sent to pharmacy

## 2023-12-28 ENCOUNTER — APPOINTMENT (OUTPATIENT)
Dept: LAB | Facility: CLINIC | Age: 33
End: 2023-12-28
Payer: COMMERCIAL

## 2023-12-28 DIAGNOSIS — K76.0 FATTY LIVER: ICD-10-CM

## 2023-12-28 DIAGNOSIS — G47.00 INSOMNIA, UNSPECIFIED TYPE: ICD-10-CM

## 2023-12-28 DIAGNOSIS — J30.2 SEASONAL ALLERGIC RHINITIS, UNSPECIFIED TRIGGER: ICD-10-CM

## 2023-12-28 DIAGNOSIS — R74.01 ELEVATION OF LEVELS OF LIVER TRANSAMINASE LEVELS: ICD-10-CM

## 2023-12-28 LAB
ALBUMIN SERPL BCP-MCNC: 4.3 G/DL (ref 3.5–5)
ALP SERPL-CCNC: 66 U/L (ref 34–104)
ALT SERPL W P-5'-P-CCNC: 118 U/L (ref 7–52)
ANION GAP SERPL CALCULATED.3IONS-SCNC: 10 MMOL/L
AST SERPL W P-5'-P-CCNC: 63 U/L (ref 13–39)
BILIRUB SERPL-MCNC: 0.29 MG/DL (ref 0.2–1)
BUN SERPL-MCNC: 8 MG/DL (ref 5–25)
CALCIUM SERPL-MCNC: 10.3 MG/DL (ref 8.4–10.2)
CHLORIDE SERPL-SCNC: 105 MMOL/L (ref 96–108)
CO2 SERPL-SCNC: 24 MMOL/L (ref 21–32)
CREAT SERPL-MCNC: 0.54 MG/DL (ref 0.6–1.3)
GFR SERPL CREATININE-BSD FRML MDRD: 126 ML/MIN/1.73SQ M
GLUCOSE SERPL-MCNC: 106 MG/DL (ref 65–140)
POTASSIUM SERPL-SCNC: 4 MMOL/L (ref 3.5–5.3)
PROT SERPL-MCNC: 7.4 G/DL (ref 6.4–8.4)
SODIUM SERPL-SCNC: 139 MMOL/L (ref 135–147)

## 2023-12-29 RX ORDER — TRAZODONE HYDROCHLORIDE 50 MG/1
50 TABLET ORAL
Qty: 30 TABLET | Refills: 0 | Status: SHIPPED | OUTPATIENT
Start: 2023-12-29

## 2023-12-29 RX ORDER — MONTELUKAST SODIUM 10 MG/1
10 TABLET ORAL
Qty: 90 TABLET | Refills: 0 | Status: SHIPPED | OUTPATIENT
Start: 2023-12-29 | End: 2024-03-28

## 2024-01-11 DIAGNOSIS — Z00.6 ENCOUNTER FOR EXAMINATION FOR NORMAL COMPARISON OR CONTROL IN CLINICAL RESEARCH PROGRAM: ICD-10-CM

## 2024-01-17 ENCOUNTER — TELEPHONE (OUTPATIENT)
Dept: FAMILY MEDICINE CLINIC | Facility: CLINIC | Age: 34
End: 2024-01-17

## 2024-01-17 DIAGNOSIS — F41.9 ANXIETY: Primary | ICD-10-CM
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
## 2024-01-22 ENCOUNTER — APPOINTMENT (OUTPATIENT)
Dept: LAB | Facility: CLINIC | Age: 34
End: 2024-01-22
Payer: COMMERCIAL

## 2024-01-22 DIAGNOSIS — Z00.6 ENCOUNTER FOR EXAMINATION FOR NORMAL COMPARISON OR CONTROL IN CLINICAL RESEARCH PROGRAM: ICD-10-CM

## 2024-01-22 PROCEDURE — 36415 COLL VENOUS BLD VENIPUNCTURE: CPT

## 2024-01-25 DIAGNOSIS — K76.0 FATTY LIVER: ICD-10-CM

## 2024-01-25 DIAGNOSIS — R74.01 ELEVATION OF LEVELS OF LIVER TRANSAMINASE LEVELS: Primary | ICD-10-CM

## 2024-02-15 LAB
APOB+LDLR+PCSK9 GENE MUT ANL BLD/T: NOT DETECTED
BRCA1+BRCA2 DEL+DUP + FULL MUT ANL BLD/T: NOT DETECTED
MLH1+MSH2+MSH6+PMS2 GN DEL+DUP+FUL M: NOT DETECTED

## 2024-02-21 PROBLEM — Z01.419 ENCOUNTER FOR ANNUAL ROUTINE GYNECOLOGICAL EXAMINATION: Status: RESOLVED | Noted: 2022-07-17 | Resolved: 2024-02-21

## 2024-02-23 ENCOUNTER — TELEPHONE (OUTPATIENT)
Dept: FAMILY MEDICINE CLINIC | Facility: CLINIC | Age: 34
End: 2024-02-23

## 2024-02-23 DIAGNOSIS — M25.50 ARTHRALGIA, UNSPECIFIED JOINT: Primary | ICD-10-CM

## 2024-02-23 NOTE — TELEPHONE ENCOUNTER
"----- Message from Natividad Norton sent at 2/22/2024  4:31 PM EST -----  Regarding: FW: Referral  Contact: [phone]    ----- Message -----  From: Zina Hdz \"Kayleigh\"  Sent: 2/22/2024   4:29 PM EST  To: [facility]  Subject: Referral                                         Herbert Rios,  The reason why I scheduled an appointment to see you next Wednesday (had to reschedule because of work, have to work at another location), I was going to get your opinion/request a referral to Rheumatology, for the past 2 months, I have had severe bone pain/joint pain, primarily in my knees, the past few days I am not able to walk at the end of the night, and I can hardly work in the morning. If you have any suggestions, please advise, I'm scheduled to see you in March, but I just can't get into you any sooner with work.  If you place a referral, I'm looking for Dr. Cisneros because he's in the same office as me.  Just let me know.  Thanks,  Kayleigh    "

## 2024-03-11 ENCOUNTER — TELEPHONE (OUTPATIENT)
Dept: BARIATRICS | Facility: CLINIC | Age: 34
End: 2024-03-11

## 2024-03-13 ENCOUNTER — OFFICE VISIT (OUTPATIENT)
Dept: FAMILY MEDICINE CLINIC | Facility: CLINIC | Age: 34
End: 2024-03-13
Payer: COMMERCIAL

## 2024-03-13 ENCOUNTER — NURSE TRIAGE (OUTPATIENT)
Age: 34
End: 2024-03-13

## 2024-03-13 VITALS
TEMPERATURE: 97.8 F | SYSTOLIC BLOOD PRESSURE: 144 MMHG | BODY MASS INDEX: 35.4 KG/M2 | HEART RATE: 97 BPM | DIASTOLIC BLOOD PRESSURE: 76 MMHG | HEIGHT: 69 IN | OXYGEN SATURATION: 98 % | WEIGHT: 239 LBS | RESPIRATION RATE: 16 BRPM

## 2024-03-13 DIAGNOSIS — E66.01 CLASS 2 SEVERE OBESITY DUE TO EXCESS CALORIES WITH SERIOUS COMORBIDITY AND BODY MASS INDEX (BMI) OF 35.0 TO 35.9 IN ADULT: ICD-10-CM

## 2024-03-13 DIAGNOSIS — I47.10 SVT (SUPRAVENTRICULAR TACHYCARDIA): ICD-10-CM

## 2024-03-13 DIAGNOSIS — Z13.1 DIABETES MELLITUS SCREENING: ICD-10-CM

## 2024-03-13 DIAGNOSIS — K75.81 NASH (NONALCOHOLIC STEATOHEPATITIS): ICD-10-CM

## 2024-03-13 DIAGNOSIS — I10 HYPERTENSION, UNSPECIFIED TYPE: Primary | ICD-10-CM

## 2024-03-13 DIAGNOSIS — R51.9 ACUTE NONINTRACTABLE HEADACHE, UNSPECIFIED HEADACHE TYPE: ICD-10-CM

## 2024-03-13 DIAGNOSIS — F32.A DEPRESSION, UNSPECIFIED DEPRESSION TYPE: ICD-10-CM

## 2024-03-13 DIAGNOSIS — F41.9 ANXIETY: ICD-10-CM

## 2024-03-13 PROBLEM — E66.812 CLASS 2 SEVERE OBESITY DUE TO EXCESS CALORIES WITH SERIOUS COMORBIDITY AND BODY MASS INDEX (BMI) OF 35.0 TO 35.9 IN ADULT (HCC): Status: ACTIVE | Noted: 2024-03-13

## 2024-03-13 PROCEDURE — 99214 OFFICE O/P EST MOD 30 MIN: CPT | Performed by: NURSE PRACTITIONER

## 2024-03-13 RX ORDER — KETOROLAC TROMETHAMINE 10 MG/1
10 TABLET, FILM COATED ORAL EVERY 6 HOURS PRN
Qty: 30 TABLET | Refills: 0 | Status: SHIPPED | OUTPATIENT
Start: 2024-03-13 | End: 2024-03-14 | Stop reason: SDUPTHER

## 2024-03-13 RX ORDER — LISINOPRIL 10 MG/1
10 TABLET ORAL DAILY
Qty: 90 TABLET | Refills: 1 | Status: SHIPPED | OUTPATIENT
Start: 2024-03-13

## 2024-03-13 NOTE — ASSESSMENT & PLAN NOTE
- Not well controlled.  - Prescription sent for lisinopril 10 mg daily.   - Advised to continue monitoring blood pressure at home.   - Recommend follow up in 1 month.

## 2024-03-13 NOTE — ASSESSMENT & PLAN NOTE
- She was given a sample of Ozempic. Take 0.25 mg weekly x 2 weeks then take 0.5 mg weekly x 3 weeks. Will then transition to Wegovy 1 mg weekly. Discussed side effects.  - Encourage healthy diet and regulae exercise.  - Recommend follow up in 1 month.

## 2024-03-13 NOTE — TELEPHONE ENCOUNTER
Regarding: High BP- vision changes/headache  ----- Message from Sintia Romano sent at 3/13/2024  1:19 PM EDT -----  Pt c/o high bp w/ vision changes, headaches. BP today was 144/100
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
## 2024-03-13 NOTE — TELEPHONE ENCOUNTER
"Reason for Disposition  • Systolic BP >= 160 OR Diastolic >= 100    Answer Assessment - Initial Assessment Questions  1. BLOOD PRESSURE: \"What is the blood pressure?\" \"Did you take at least two measurements 5 minutes apart?\"      About 15 minutes ago was 144/100  2. ONSET: \"When did you take your blood pressure?\"      Had a PA-C take it about 15 minutes prior to call  3. HOW: \"How did you obtain the blood pressure?\" (e.g., visiting nurse, automatic home BP monitor)      manual  4. HISTORY: \"Do you have a history of high blood pressure?\"      Yes, was previously on Lisinopril in the past but this was discontinued  5. MEDICATIONS: \"Are you taking any medications for blood pressure?\" \"Have you missed any doses recently?\"      No, lisinopril d/c one year ago  6. OTHER SYMPTOMS: \"Do you have any symptoms?\" (e.g., headache, chest pain, blurred vision, difficulty breathing, weakness)      Headache, feels like eyes are \"burning\", occasional floaters in vision  7. PREGNANCY: \"Is there any chance you are pregnant?\" \"When was your last menstrual period?\"      no    Protocols used: Blood Pressure - High-ADULT-OH    "

## 2024-03-13 NOTE — ASSESSMENT & PLAN NOTE
- Confirmed by liver biopsy February 2023. Chronically elevated LFTs.   - Continue routine follow up with GI/Hepatology.

## 2024-03-13 NOTE — TELEPHONE ENCOUNTER
"T/C to patient to triage symptoms related to high blood pressure. States that she used to be on Lisinopril approx one year ago but this was discontinued by Cardiology s/p ablation procedure.  For the last 2-3 weeks, she has noticed her BP is elevated and she has been having regular headaches. Today at work, she experienced an especially strong headache and had a PA-C check her BP which was 144/100. She denies blurry vision but states that her eyes are \"burning\". Denies any chest pain, dizziness or SOB.  She would prefer to not have to go to the ER.  Scheduled for office visit today at 3 pm with Blanca MILLAN.  "

## 2024-03-14 DIAGNOSIS — R51.9 ACUTE NONINTRACTABLE HEADACHE, UNSPECIFIED HEADACHE TYPE: ICD-10-CM

## 2024-03-14 RX ORDER — KETOROLAC TROMETHAMINE 10 MG/1
10 TABLET, FILM COATED ORAL EVERY 6 HOURS PRN
Qty: 30 TABLET | Refills: 0 | Status: SHIPPED | OUTPATIENT
Start: 2024-03-14

## 2024-03-19 PROBLEM — Z01.419 ENCOUNTER FOR ANNUAL ROUTINE GYNECOLOGICAL EXAMINATION: Status: ACTIVE | Noted: 2024-03-19

## 2024-03-22 ENCOUNTER — TELEPHONE (OUTPATIENT)
Dept: FAMILY MEDICINE CLINIC | Facility: CLINIC | Age: 34
End: 2024-03-22

## 2024-03-22 DIAGNOSIS — E53.8 B12 DEFICIENCY: Primary | ICD-10-CM

## 2024-03-22 NOTE — TELEPHONE ENCOUNTER
"----- Message from Michell Montague sent at 3/21/2024  2:35 PM EDT -----  Regarding: FW: Blood tests  Contact: [phone]    ----- Message -----  From: Zina Hdz \"Kayleigh\"  Sent: 3/21/2024   1:26 PM EDT  To: Primary [facility] Pod Clinical  Subject: Blood tests                                      Hi,  At our visit I wanted to ask to if you would be able to add a VB12 to my labs, I will be going Monday before work, if you wouldn't mind adding this, I would appreciate it.   Thanks  Kayleigh    "

## 2024-03-27 ENCOUNTER — APPOINTMENT (OUTPATIENT)
Dept: LAB | Facility: CLINIC | Age: 34
End: 2024-03-27
Payer: COMMERCIAL

## 2024-03-27 ENCOUNTER — APPOINTMENT (OUTPATIENT)
Dept: RADIOLOGY | Facility: CLINIC | Age: 34
End: 2024-03-27
Payer: COMMERCIAL

## 2024-03-27 DIAGNOSIS — Z00.8 ENCOUNTER FOR OTHER GENERAL EXAMINATION: ICD-10-CM

## 2024-03-27 DIAGNOSIS — M25.50 PAIN IN JOINT, MULTIPLE SITES: ICD-10-CM

## 2024-03-27 DIAGNOSIS — F41.9 ANXIETY: ICD-10-CM

## 2024-03-27 DIAGNOSIS — E53.8 B12 DEFICIENCY: ICD-10-CM

## 2024-03-27 DIAGNOSIS — Z13.1 DIABETES MELLITUS SCREENING: ICD-10-CM

## 2024-03-27 DIAGNOSIS — I10 HYPERTENSION, UNSPECIFIED TYPE: ICD-10-CM

## 2024-03-27 DIAGNOSIS — Z00.8 HEALTH EXAMINATION IN POPULATION SURVEY: ICD-10-CM

## 2024-03-27 DIAGNOSIS — E66.01 CLASS 2 SEVERE OBESITY DUE TO EXCESS CALORIES WITH SERIOUS COMORBIDITY AND BODY MASS INDEX (BMI) OF 35.0 TO 35.9 IN ADULT (HCC): ICD-10-CM

## 2024-03-27 LAB
ALBUMIN SERPL BCP-MCNC: 4.2 G/DL (ref 3.5–5)
ALP SERPL-CCNC: 71 U/L (ref 34–104)
ALT SERPL W P-5'-P-CCNC: 174 U/L (ref 7–52)
ANA SER QL IA: NEGATIVE
ANION GAP SERPL CALCULATED.3IONS-SCNC: 9 MMOL/L (ref 4–13)
AST SERPL W P-5'-P-CCNC: 93 U/L (ref 13–39)
BASOPHILS # BLD AUTO: 0.03 THOUSANDS/ÂΜL (ref 0–0.1)
BASOPHILS NFR BLD AUTO: 0 % (ref 0–1)
BILIRUB SERPL-MCNC: 0.52 MG/DL (ref 0.2–1)
BUN SERPL-MCNC: 8 MG/DL (ref 5–25)
C3 SERPL-MCNC: 171 MG/DL (ref 87–200)
C4 SERPL-MCNC: 42 MG/DL (ref 19–52)
CALCIUM SERPL-MCNC: 8.9 MG/DL (ref 8.4–10.2)
CHLORIDE SERPL-SCNC: 106 MMOL/L (ref 96–108)
CHOLEST SERPL-MCNC: 155 MG/DL
CK SERPL-CCNC: 41 U/L (ref 26–192)
CO2 SERPL-SCNC: 26 MMOL/L (ref 21–32)
CREAT SERPL-MCNC: 0.59 MG/DL (ref 0.6–1.3)
CRP SERPL QL: 10.4 MG/L
CRYOGLOB RF SER-ACNC: ABNORMAL [IU]/ML
EOSINOPHIL # BLD AUTO: 0.08 THOUSAND/ÂΜL (ref 0–0.61)
EOSINOPHIL NFR BLD AUTO: 1 % (ref 0–6)
ERYTHROCYTE [DISTWIDTH] IN BLOOD BY AUTOMATED COUNT: 12.8 % (ref 11.6–15.1)
EST. AVERAGE GLUCOSE BLD GHB EST-MCNC: 117 MG/DL
GFR SERPL CREATININE-BSD FRML MDRD: 121 ML/MIN/1.73SQ M
GLUCOSE P FAST SERPL-MCNC: 94 MG/DL (ref 65–99)
HBA1C MFR BLD: 5.7 %
HCT VFR BLD AUTO: 43.9 % (ref 34.8–46.1)
HDLC SERPL-MCNC: 25 MG/DL
HGB BLD-MCNC: 14.5 G/DL (ref 11.5–15.4)
IGA SERPL-MCNC: 187 MG/DL (ref 66–433)
IGG SERPL-MCNC: 1298 MG/DL (ref 635–1741)
IGM SERPL-MCNC: 89 MG/DL (ref 45–281)
IMM GRANULOCYTES # BLD AUTO: 0.03 THOUSAND/UL (ref 0–0.2)
IMM GRANULOCYTES NFR BLD AUTO: 0 % (ref 0–2)
LDLC SERPL CALC-MCNC: 99 MG/DL (ref 0–100)
LYMPHOCYTES # BLD AUTO: 2.54 THOUSANDS/ÂΜL (ref 0.6–4.47)
LYMPHOCYTES NFR BLD AUTO: 32 % (ref 14–44)
MCH RBC QN AUTO: 29.1 PG (ref 26.8–34.3)
MCHC RBC AUTO-ENTMCNC: 33 G/DL (ref 31.4–37.4)
MCV RBC AUTO: 88 FL (ref 82–98)
MONOCYTES # BLD AUTO: 0.32 THOUSAND/ÂΜL (ref 0.17–1.22)
MONOCYTES NFR BLD AUTO: 4 % (ref 4–12)
NEUTROPHILS # BLD AUTO: 5.03 THOUSANDS/ÂΜL (ref 1.85–7.62)
NEUTS SEG NFR BLD AUTO: 63 % (ref 43–75)
NONHDLC SERPL-MCNC: 130 MG/DL
NRBC BLD AUTO-RTO: 0 /100 WBCS
PLATELET # BLD AUTO: 232 THOUSANDS/UL (ref 149–390)
PMV BLD AUTO: 10.7 FL (ref 8.9–12.7)
POTASSIUM SERPL-SCNC: 4.2 MMOL/L (ref 3.5–5.3)
PROT SERPL-MCNC: 6.8 G/DL (ref 6.4–8.4)
RBC # BLD AUTO: 4.98 MILLION/UL (ref 3.81–5.12)
RHEUMATOID FACT SER QL LA: POSITIVE
SODIUM SERPL-SCNC: 141 MMOL/L (ref 135–147)
TRIGL SERPL-MCNC: 155 MG/DL
TSH SERPL DL<=0.05 MIU/L-ACNC: 1.54 UIU/ML (ref 0.45–4.5)
VIT B12 SERPL-MCNC: 459 PG/ML (ref 180–914)
WBC # BLD AUTO: 8.03 THOUSAND/UL (ref 4.31–10.16)

## 2024-03-27 PROCEDURE — 86038 ANTINUCLEAR ANTIBODIES: CPT

## 2024-03-27 PROCEDURE — 86140 C-REACTIVE PROTEIN: CPT

## 2024-03-27 PROCEDURE — 80053 COMPREHEN METABOLIC PANEL: CPT

## 2024-03-27 PROCEDURE — 82784 ASSAY IGA/IGD/IGG/IGM EACH: CPT

## 2024-03-27 PROCEDURE — 82550 ASSAY OF CK (CPK): CPT

## 2024-03-27 PROCEDURE — 83036 HEMOGLOBIN GLYCOSYLATED A1C: CPT

## 2024-03-27 PROCEDURE — 86225 DNA ANTIBODY NATIVE: CPT

## 2024-03-27 PROCEDURE — 36415 COLL VENOUS BLD VENIPUNCTURE: CPT

## 2024-03-27 PROCEDURE — 82085 ASSAY OF ALDOLASE: CPT

## 2024-03-27 PROCEDURE — 84165 PROTEIN E-PHORESIS SERUM: CPT

## 2024-03-27 PROCEDURE — 85025 COMPLETE CBC W/AUTO DIFF WBC: CPT

## 2024-03-27 PROCEDURE — 84443 ASSAY THYROID STIM HORMONE: CPT

## 2024-03-27 PROCEDURE — 86200 CCP ANTIBODY: CPT

## 2024-03-27 PROCEDURE — 72202 X-RAY EXAM SI JOINTS 3/> VWS: CPT

## 2024-03-27 PROCEDURE — 80061 LIPID PANEL: CPT

## 2024-03-27 PROCEDURE — 86334 IMMUNOFIX E-PHORESIS SERUM: CPT

## 2024-03-27 PROCEDURE — 86160 COMPLEMENT ANTIGEN: CPT

## 2024-03-27 PROCEDURE — 86431 RHEUMATOID FACTOR QUANT: CPT

## 2024-03-27 PROCEDURE — 86235 NUCLEAR ANTIGEN ANTIBODY: CPT

## 2024-03-27 PROCEDURE — 82607 VITAMIN B-12: CPT

## 2024-03-27 PROCEDURE — 86430 RHEUMATOID FACTOR TEST QUAL: CPT

## 2024-03-27 PROCEDURE — 72110 X-RAY EXAM L-2 SPINE 4/>VWS: CPT

## 2024-03-28 LAB
ALBUMIN SERPL ELPH-MCNC: 3.93 G/DL (ref 3.2–5.1)
ALBUMIN SERPL ELPH-MCNC: 57 % (ref 48–70)
ALDOLASE SERPL-CCNC: 9.4 U/L (ref 3.3–10.3)
ALPHA1 GLOB SERPL ELPH-MCNC: 0.27 G/DL (ref 0.15–0.47)
ALPHA1 GLOB SERPL ELPH-MCNC: 3.9 % (ref 1.8–7)
ALPHA2 GLOB SERPL ELPH-MCNC: 0.61 G/DL (ref 0.42–1.04)
ALPHA2 GLOB SERPL ELPH-MCNC: 8.8 % (ref 5.9–14.9)
BETA GLOB ABNORMAL SERPL ELPH-MCNC: 0.44 G/DL (ref 0.31–0.57)
BETA1 GLOB SERPL ELPH-MCNC: 6.4 % (ref 4.7–7.7)
BETA2 GLOB SERPL ELPH-MCNC: 5.7 % (ref 3.1–7.9)
BETA2+GAMMA GLOB SERPL ELPH-MCNC: 0.39 G/DL (ref 0.2–0.58)
DSDNA AB SER-ACNC: <1 IU/ML (ref 0–9)
ENA SS-A AB SER-ACNC: <0.2 AI (ref 0–0.9)
ENA SS-B AB SER-ACNC: <0.2 AI (ref 0–0.9)
GAMMA GLOB ABNORMAL SERPL ELPH-MCNC: 1.26 G/DL (ref 0.4–1.66)
GAMMA GLOB SERPL ELPH-MCNC: 18.2 % (ref 6.9–22.3)
IGG/ALB SER: 1.33 {RATIO} (ref 1.1–1.8)
INTERPRETATION UR IFE-IMP: NORMAL
PROT PATTERN SERPL ELPH-IMP: NORMAL
PROT SERPL-MCNC: 6.9 G/DL (ref 6.4–8.2)

## 2024-03-28 PROCEDURE — 86334 IMMUNOFIX E-PHORESIS SERUM: CPT | Performed by: PATHOLOGY

## 2024-03-28 PROCEDURE — 84165 PROTEIN E-PHORESIS SERUM: CPT | Performed by: PATHOLOGY

## 2024-03-28 RX ORDER — CITALOPRAM 20 MG/1
20 TABLET ORAL DAILY
Qty: 90 TABLET | Refills: 1 | Status: SHIPPED | OUTPATIENT
Start: 2024-03-28

## 2024-03-29 ENCOUNTER — TELEPHONE (OUTPATIENT)
Dept: FAMILY MEDICINE CLINIC | Facility: CLINIC | Age: 34
End: 2024-03-29

## 2024-03-29 LAB — CCP AB SER IA-ACNC: 1.5

## 2024-03-29 NOTE — TELEPHONE ENCOUNTER
----- Message from MONTY Horowitz sent at 3/29/2024  2:29 PM EDT -----  Labs show elevated A1c, triglycerides, and LFTs. Rest of labs that I ordered are stable.

## 2024-03-31 DIAGNOSIS — R74.01 ELEVATION OF LEVELS OF LIVER TRANSAMINASE LEVELS: Primary | ICD-10-CM

## 2024-03-31 DIAGNOSIS — K76.0 FATTY LIVER: ICD-10-CM

## 2024-04-06 NOTE — ASSESSMENT & PLAN NOTE
- Currently on Celexa 20 mg daily and tolerating well but still having periods of increased anxiety and difficulty sleeping    - Referral placed to Psychiatry for additional management  No

## 2024-04-16 ENCOUNTER — OFFICE VISIT (OUTPATIENT)
Dept: FAMILY MEDICINE CLINIC | Facility: CLINIC | Age: 34
End: 2024-04-16
Payer: COMMERCIAL

## 2024-04-16 VITALS
WEIGHT: 231.38 LBS | DIASTOLIC BLOOD PRESSURE: 60 MMHG | TEMPERATURE: 97.8 F | HEIGHT: 69 IN | BODY MASS INDEX: 34.27 KG/M2 | RESPIRATION RATE: 16 BRPM | HEART RATE: 84 BPM | OXYGEN SATURATION: 97 % | SYSTOLIC BLOOD PRESSURE: 122 MMHG

## 2024-04-16 DIAGNOSIS — B36.9 FUNGAL DERMATITIS: ICD-10-CM

## 2024-04-16 DIAGNOSIS — K75.81 NASH (NONALCOHOLIC STEATOHEPATITIS): ICD-10-CM

## 2024-04-16 DIAGNOSIS — R73.9 HYPERGLYCEMIA: ICD-10-CM

## 2024-04-16 DIAGNOSIS — I10 HYPERTENSION, UNSPECIFIED TYPE: Primary | ICD-10-CM

## 2024-04-16 DIAGNOSIS — F41.9 ANXIETY: ICD-10-CM

## 2024-04-16 DIAGNOSIS — F32.A DEPRESSION, UNSPECIFIED DEPRESSION TYPE: ICD-10-CM

## 2024-04-16 PROCEDURE — 99214 OFFICE O/P EST MOD 30 MIN: CPT | Performed by: NURSE PRACTITIONER

## 2024-04-16 RX ORDER — NYSTATIN 100000 [USP'U]/G
POWDER TOPICAL 3 TIMES DAILY
Qty: 30 G | Refills: 1 | Status: SHIPPED | OUTPATIENT
Start: 2024-04-16

## 2024-04-16 RX ORDER — CLOTRIMAZOLE AND BETAMETHASONE DIPROPIONATE 10; .64 MG/G; MG/G
CREAM TOPICAL 2 TIMES DAILY
Qty: 45 G | Refills: 0 | Status: SHIPPED | OUTPATIENT
Start: 2024-04-16

## 2024-04-16 NOTE — ASSESSMENT & PLAN NOTE
- Improving since restarting lisinopril 10 mg daily. Continue same.   - Will continue to monitor.

## 2024-04-16 NOTE — ASSESSMENT & PLAN NOTE
- Hemoglobin A1c 5.7.   - Encourage low carb diet and regular exercise.  - Will continue to monitor fasting glucose and A1c.

## 2024-04-16 NOTE — PROGRESS NOTES
Name: Zina Hdz      : 1990      MRN: [id number]  Encounter Provider: MONTY Horowitz  Encounter Date: 2024   Encounter department: [facility]    Assessment & Plan     1. Hypertension, unspecified type  Assessment & Plan:  - Improving since restarting lisinopril 10 mg daily. Continue same.   - Will continue to monitor.       2. RIVER (nonalcoholic steatohepatitis)  Assessment & Plan:  - Confirmed by liver biopsy 2023. Chronically elevated LFTs.   - Encourage low fat diet and regular exercise.  - Continue routine follow up with GI/Hepatology - has appt .      3. Hyperglycemia  Assessment & Plan:  - Hemoglobin A1c 5.7.   - Encourage low carb diet and regular exercise.  - Will continue to monitor fasting glucose and A1c.       4. Fungal dermatitis  Assessment & Plan:  - Prescription sent for Lotrisone cream and Nystatin powder.    Orders:  -     nystatin (MYCOSTATIN) powder; Apply topically 3 (three) times a day  -     clotrimazole-betamethasone (LOTRISONE) 1-0.05 % cream; Apply topically 2 (two) times a day    5. Depression, unspecified depression type  Assessment & Plan:  - Stable on Celexa 20 mg daily. Continue same.   - Will continue to monitor.          6. Anxiety  Assessment & Plan:  - Stable on Celexa 20 mg daily. Continue same.   - Will continue to monitor.               Subjective     Patient presents to office today for follow up for hypertension. She was started back on lisinopril 10 mg daily. Her blood pressure is well controlled in the office today. She was given a sample of Ozempic with plans to transition to Wegovy 1 mg but she has not been able to find the 1 mg dose in stock. She has been watching what she is eating lately. She is down 8 pounds last visit. Concerned with her chronically elevated liver enzymes. She has a follow up with GI on . Also complains about a rash under her breasts. Denies any other concerns or complaints today.          Review of Systems   Constitutional:  Negative for fatigue and fever.   HENT:  Negative for trouble swallowing.    Eyes:  Negative for visual disturbance.   Respiratory:  Negative for cough and shortness of breath.    Cardiovascular:  Negative for chest pain and palpitations.   Gastrointestinal:  Negative for abdominal pain and blood in stool.   Endocrine: Negative for cold intolerance and heat intolerance.   Genitourinary:  Negative for difficulty urinating and dysuria.   Musculoskeletal:  Negative for gait problem.   Skin:  Negative for rash.   Neurological:  Negative for dizziness, syncope and headaches.   Hematological:  Negative for adenopathy.   Psychiatric/Behavioral:  Negative for behavioral problems.        Past Medical History:   Diagnosis Date    Allergic     Anxiety     Depression     Hypertension     SVT (supraventricular tachycardia)      Past Surgical History:   Procedure Laterality Date    CARDIAC ELECTROPHYSIOLOGY PROCEDURE N/A 1/30/2023    Procedure: Cardiac eps/svt ablation;  Surgeon: José Loving DO;  Location: [facility];  Service: Cardiology    IR BIOPSY LIVER RANDOM  2/9/2023     Family History   Problem Relation Age of Onset    Depression Mother     Thyroid disease Mother     Hypertension Mother     Depression Father     Depression Maternal Grandmother     Thyroid disease Maternal Grandmother     Hypertension Maternal Grandmother      Social History     Socioeconomic History    Marital status: Single     Spouse name: None    Number of children: None    Years of education: None    Highest education level: None   Occupational History    None   Tobacco Use    Smoking status: Never     Passive exposure: Never    Smokeless tobacco: Never   Vaping Use    Vaping status: Never Used   Substance and Sexual Activity    Alcohol use: Yes     Alcohol/week: 2.0 standard drinks of alcohol     Types: 2 Glasses of wine per week     Comment: socially    Drug use: Never    Sexual activity: None    Other Topics Concern    None   Social History Narrative    None     Social Determinants of Health     Financial Resource Strain: Not on file   Food Insecurity: Not on file   Transportation Needs: Not on file   Physical Activity: Not on file   Stress: Not on file   Social Connections: Not on file   Intimate Partner Violence: Not on file   Housing Stability: Not on file     Current Outpatient Medications on File Prior to Visit   Medication Sig    citalopram (CeleXA) 20 mg tablet Take 1 tablet (20 mg total) by mouth daily    ketorolac (TORADOL) 10 mg tablet Take 1 tablet (10 mg total) by mouth every 6 (six) hours as needed for moderate pain    lisinopril (ZESTRIL) 10 mg tablet Take 1 tablet (10 mg total) by mouth daily    prochlorperazine (COMPAZINE) 10 mg tablet Take 1 tablet (10 mg total) by mouth every 6 (six) hours as needed for nausea or vomiting    traZODone (DESYREL) 50 mg tablet Take 1 tablet (50 mg total) by mouth daily at bedtime as needed for sleep    famotidine (PEPCID) 20 mg tablet Take 1 tablet (20 mg total) by mouth 2 (two) times a day for 12 days    montelukast (SINGULAIR) 10 mg tablet Take 1 tablet (10 mg total) by mouth daily at bedtime    ondansetron (ZOFRAN) 4 mg tablet Take 1 tablet (4 mg total) by mouth every 8 (eight) hours as needed for nausea or vomiting for up to 10 days    [DISCONTINUED] norethindrone (Isadora) 0.35 MG tablet Take 1 tablet (0.35 mg total) by mouth daily    [DISCONTINUED] Semaglutide-Weight Management (WEGOVY) 1 MG/0.5ML Inject 0.5 mL (1 mg total) under the skin once a week     No Known Allergies  Immunization History   Administered Date(s) Administered    COVID-19 PFIZER VACCINE 0.3 ML IM 06/01/2021, 06/22/2021    COVID-19 Pfizer vac (Surya-sucrose, gray cap) 12 yr+ IM 01/14/2022    DTaP 5 1992, 1992, 1992, 1992, 1992    HPV Quadrivalent 04/21/2008, 07/02/2008    HPV9 02/21/2020    Hep B, adult 1992, 1992, 06/22/1993    Hib (PRP-OMP)  "1992, 1992, 1992, 09/09/1993    INFLUENZA 10/04/2016, 10/03/2017, 10/25/2021    IPV 1992, 1992, 09/09/1993, 01/20/1998    Influenza Quadrivalent Preservative Free 3 years and older IM 10/22/2018, 10/23/2019, 10/23/2020    MMR 06/22/1993, 01/20/1998    Meningococcal MCV4P 04/21/2008    Meningococcal, Unknown Serogroups 04/21/2008    Td (adult), adsorbed 1992    Tdap 02/27/2017    Varicella Zoster Immune Globulin 01/07/2000       Objective     /60 (BP Location: Left arm, Patient Position: Sitting, Cuff Size: Adult)   Pulse 84   Temp 97.8 °F (36.6 °C) (Tympanic)   Resp 16   Ht 5' 9\" (1.753 m)   Wt 105 kg (231 lb 6 oz)   SpO2 97%   BMI 34.17 kg/m²     Physical Exam  Vitals and nursing note reviewed.   Constitutional:       General: She is not in acute distress.     Appearance: Normal appearance. She is not ill-appearing.   HENT:      Head: Normocephalic and atraumatic.      Right Ear: External ear normal.      Left Ear: External ear normal.   Eyes:      Conjunctiva/sclera: Conjunctivae normal.   Cardiovascular:      Rate and Rhythm: Normal rate and regular rhythm.      Heart sounds: Normal heart sounds.   Pulmonary:      Effort: Pulmonary effort is normal.      Breath sounds: Normal breath sounds.   Musculoskeletal:         General: Normal range of motion.      Cervical back: Normal range of motion.   Skin:     General: Skin is warm and dry.   Neurological:      Mental Status: She is alert and oriented to person, place, and time.   Psychiatric:         Mood and Affect: Mood normal.         Behavior: Behavior normal.       MONTY Horowitz    "

## 2024-04-16 NOTE — ASSESSMENT & PLAN NOTE
- Confirmed by liver biopsy February 2023. Chronically elevated LFTs.   - Encourage low fat diet and regular exercise.  - Continue routine follow up with GI/Hepatology - has appt 7/22.

## 2024-04-24 ENCOUNTER — TELEPHONE (OUTPATIENT)
Age: 34
End: 2024-04-24

## 2024-04-24 ENCOUNTER — APPOINTMENT (OUTPATIENT)
Dept: LAB | Facility: CLINIC | Age: 34
End: 2024-04-24
Payer: COMMERCIAL

## 2024-04-24 ENCOUNTER — TELEPHONE (OUTPATIENT)
Dept: GASTROENTEROLOGY | Facility: CLINIC | Age: 34
End: 2024-04-24

## 2024-04-24 DIAGNOSIS — K76.0 FATTY LIVER: ICD-10-CM

## 2024-04-24 DIAGNOSIS — R74.01 ELEVATION OF LEVELS OF LIVER TRANSAMINASE LEVELS: ICD-10-CM

## 2024-04-24 LAB
ALBUMIN SERPL BCP-MCNC: 4.3 G/DL (ref 3.5–5)
ALP SERPL-CCNC: 62 U/L (ref 34–104)
ALT SERPL W P-5'-P-CCNC: 176 U/L (ref 7–52)
ANION GAP SERPL CALCULATED.3IONS-SCNC: 10 MMOL/L (ref 4–13)
AST SERPL W P-5'-P-CCNC: 93 U/L (ref 13–39)
BILIRUB SERPL-MCNC: 0.53 MG/DL (ref 0.2–1)
BUN SERPL-MCNC: 11 MG/DL (ref 5–25)
CALCIUM SERPL-MCNC: 9.5 MG/DL (ref 8.4–10.2)
CHLORIDE SERPL-SCNC: 105 MMOL/L (ref 96–108)
CO2 SERPL-SCNC: 24 MMOL/L (ref 21–32)
CREAT SERPL-MCNC: 0.59 MG/DL (ref 0.6–1.3)
GFR SERPL CREATININE-BSD FRML MDRD: 121 ML/MIN/1.73SQ M
GLUCOSE SERPL-MCNC: 49 MG/DL (ref 65–140)
POTASSIUM SERPL-SCNC: 4 MMOL/L (ref 3.5–5.3)
PROT SERPL-MCNC: 7.5 G/DL (ref 6.4–8.4)
SODIUM SERPL-SCNC: 139 MMOL/L (ref 135–147)

## 2024-04-24 PROCEDURE — 36415 COLL VENOUS BLD VENIPUNCTURE: CPT

## 2024-04-24 PROCEDURE — 80053 COMPREHEN METABOLIC PANEL: CPT

## 2024-04-25 DIAGNOSIS — J30.2 SEASONAL ALLERGIC RHINITIS, UNSPECIFIED TRIGGER: ICD-10-CM

## 2024-04-25 DIAGNOSIS — E16.2 HYPOGLYCEMIA: Primary | ICD-10-CM

## 2024-04-25 RX ORDER — BLOOD SUGAR DIAGNOSTIC
STRIP MISCELLANEOUS
Qty: 100 EACH | Refills: 3 | Status: SHIPPED | OUTPATIENT
Start: 2024-04-25

## 2024-04-25 RX ORDER — LANCETS 33 GAUGE
EACH MISCELLANEOUS
Qty: 100 EACH | Refills: 3 | Status: SHIPPED | OUTPATIENT
Start: 2024-04-25

## 2024-04-25 RX ORDER — BLOOD-GLUCOSE METER
KIT MISCELLANEOUS
Qty: 1 KIT | Refills: 0 | Status: SHIPPED | OUTPATIENT
Start: 2024-04-25

## 2024-04-25 NOTE — TELEPHONE ENCOUNTER
Critical Lab: Glucose 49  /Drawn today. 11:15 a.m. // Ordering Provider Josef Lieberman ..     VIA TT confirmed receipt by Dr. Biju Kingsley for STAT results.    The following critical/stat result was read back to the lab as stated above and [employer] messaged to the on-call provider. The provider confirmed receipt of the message.

## 2024-04-25 NOTE — TELEPHONE ENCOUNTER
Received notification from lab that pt had critical lab value of glucose of 49. Called pt, no answer and left VM. Called pt's SO Roman Omero, he answered and stated pt was sleeping and did not endorse symptoms earlier. Recommended that she be awoken and have her blood sugar checked immediately, if low to drink juice, and if symptomatic to call 911 and go to ED. Pt's SO verbalized understanding.     Biju Kingsley MD  PGY-5 Gastroenterology Fellow

## 2024-04-26 RX ORDER — MONTELUKAST SODIUM 10 MG/1
10 TABLET ORAL
Qty: 90 TABLET | Refills: 1 | Status: SHIPPED | OUTPATIENT
Start: 2024-04-26 | End: 2024-07-25

## 2024-05-01 PROBLEM — Z01.419 ENCOUNTER FOR ANNUAL ROUTINE GYNECOLOGICAL EXAMINATION: Status: RESOLVED | Noted: 2024-03-19 | Resolved: 2024-05-01

## 2024-05-01 PROBLEM — Z13.1 DIABETES MELLITUS SCREENING: Status: RESOLVED | Noted: 2024-03-13 | Resolved: 2024-05-01

## 2024-05-10 ENCOUNTER — TELEPHONE (OUTPATIENT)
Age: 34
End: 2024-05-10

## 2024-05-10 NOTE — TELEPHONE ENCOUNTER
Please review for possibility of surgical consult.  Pt previously seen on the medical side, no prior hx of bariatric surgery.  Thank you!

## 2024-05-10 NOTE — TELEPHONE ENCOUNTER
Called patient and informed not a surgical candidate.  Patient scheduled for appointment in [place] and added to waitlist.

## 2024-05-16 ENCOUNTER — OFFICE VISIT (OUTPATIENT)
Age: 34
End: 2024-05-16
Payer: COMMERCIAL

## 2024-05-16 ENCOUNTER — TRANSCRIBE ORDERS (OUTPATIENT)
Age: 34
End: 2024-05-16

## 2024-05-16 VITALS
HEIGHT: 69 IN | TEMPERATURE: 97.8 F | SYSTOLIC BLOOD PRESSURE: 108 MMHG | HEART RATE: 93 BPM | WEIGHT: 230.2 LBS | DIASTOLIC BLOOD PRESSURE: 60 MMHG | BODY MASS INDEX: 34.1 KG/M2

## 2024-05-16 DIAGNOSIS — E66.9 OBESITY, CLASS I, BMI 30-34.9: Primary | ICD-10-CM

## 2024-05-16 DIAGNOSIS — K75.81 NASH (NONALCOHOLIC STEATOHEPATITIS): ICD-10-CM

## 2024-05-16 DIAGNOSIS — R73.9 HYPERGLYCEMIA: ICD-10-CM

## 2024-05-16 DIAGNOSIS — I10 HYPERTENSION, UNSPECIFIED TYPE: ICD-10-CM

## 2024-05-16 PROCEDURE — 99214 OFFICE O/P EST MOD 30 MIN: CPT | Performed by: PHYSICIAN ASSISTANT

## 2024-05-16 NOTE — PROGRESS NOTES
Assessment/Plan:  Kayleigh was seen today for follow-up.    Diagnoses and all orders for this visit:    Obesity, Class I, BMI 30-34.9  -     Semaglutide-Weight Management (WEGOVY) 0.25 MG/0.5ML; Inject 0.5 mL (0.25 mg total) under the skin once a week    Hypertension, unspecified type    RIVER (nonalcoholic steatohepatitis)    Hyperglycemia  -     Semaglutide-Weight Management (WEGOVY) 0.25 MG/0.5ML; Inject 0.5 mL (0.25 mg total) under the skin once a week      - Weight not at goal  - Patient is interested in Conservative Program  - Labs reviewed: As below.    General Recommendations:  Nutrition:  Eat breakfast daily.  Do not skip meals.     Food log (ie.) www.Sabre.com, sparkpeople.com, loseit.com, calorieking.com, etc.    Practice mindful eating.  Be sure to set aside time to eat, eat slowly, and savor your food.    Hydration:    At least 64oz of water daily.  No sugar sweetened beverages.  No juice (eat the fruit instead).    Exercise:  Studies have shown that the ideal exercise goal is somewhere between 150 to 300 minutes of moderate intensity exercise a week.  Start with exercising 10 minutes every other day and gradually increase physical activity with a goal of at least 150 minutes of moderate intensity exercise a week, divided over at least 3 days a week.  An example of this would be exercising 30 minutes a day, 5 days a week.  Resistance training can increase muscle mass and increase our resting metabolic rate.   FULL BODY resistance training is recommended 2-3 times a week.  Do not do this on consecutive days to allow for muscle recovery.    Aim for a bare minimum 5000 steps, even on days you do not exercise.    Monitoring:   Weigh yourself daily.  If this causes undue stress, then just weigh yourself once a week.  Weigh yourself the same time of the day with the same amount of clothing on.  Preferably this should be done after waking up, before you eat, and with no clothing or minimal clothing  on.    Specific Goals:  Goal protein intake of 60-80 grams per day  Gradually increase physical activity to a goal of 5 days per week for 30 minutes of MODERATE intensity PLUS 2 days per week of FULL BODY resistance training  No sugary beverages. At least 64oz of water daily.    Calorie goal:  [id number] percy/day    Return visit:    Calorie tracking/calorie deficit  Physical activity - as tolerated with her knee arthralgia  AOMl  Contraception: condoms. Patient will take a pregnancy test prior to starting pharmacotherapy.   Not a candidate for phentermine d/t hx of SVT  Patient interested in restarting GLP1 RA  - Patient denies personal history of pancreatitis. Patient also denies personal and family history of thyroid cancer and multiple endocrine neoplasia type 2 (MEN 2 tumor).   RTC in 3 months       WEGOVY  I am sending the Wegovy to your pharmacy. This will start the prior authorization process. My office takes care of that. It can take up to 3 weeks to process.     Start Wegovy 0.25 mg subcutaneously once a week for 4 weeks, then increase to 0.5 mg subcutaneously each week. A few days after you take the first pen of the starting dose of 0.25 mg, please message me with an update regarding how you are feeling. As long as you are tolerating it well, I will submit the next dose of 0.5 mg to the pharmacy, as we will also likely need to do another prior authorization for that dose.     - Side effects of Wegovy: nausea, vomiting, diarrhea, and constipation. If severe abdominal pain develops, stop Wegovy and go to the ER, as this could be pancreatitis. Monitor heart rate while on Wegovy and if resting heart rate greater than 100 beats per minute, patient will notify me.   - If you need to have surgery or another procedure, such as an upper endoscopy or colonoscopy, please contact my office as often medications like Wegovy need to be held for a certain amount of time prior to a procedure.         Total time spent  reviewing chart, interviewing patient, examining patient, discussing plan, answering all questions, and documentin min.       ______________________________________________________________________        Subjective:   Chief Complaint   Patient presents with    Follow-up     Mwm od f/u       HPI: Zina Hdz  is a 34 y.o. female with history of SVT, HTN, RIVER, depression, insomnia, and excess weight, here to pursue weight loss management.  Previous notes and records have been reviewed.    Prediabetes - hemoglobin A1c 5.7 (3/27/24)  TSH WNL    HTN - lisinopril  RIVER - follows with hepatology. Chronically elevated LFTs  Depression -   celexa    Previously took wegovy. In 2023 she had been late a few weeks and restarted a higher dose. Those ultimately led to a hospitalization for uncontrolled nausea and vomiting. Lipase was WNL and no radiographic findings of pancreatitis on CT.  Other than that episode, she felt she tolerated the medicine well and was having good weight loss results. She is interested in restarting.     Decreased physical activity with left knee arthralgia. + RA    Dietary - trying to minimize starches. Portion control     Currently not using contraception - had IUD removed months ago. Has not had return of menstrual cycle.   Patient will commit to condoms and discuss with OBGYN further options.     HPI  Wt Readings from Last 20 Encounters:   24 104 kg (230 lb 3.2 oz)   24 105 kg (231 lb 6 oz)   24 108 kg (239 lb)   23 98.9 kg (218 lb)   23 101 kg (222 lb)   23 104 kg (229 lb)   23 104 kg (229 lb)   23 105 kg (231 lb)   23 105 kg (232 lb)   23 103 kg (227 lb 9.6 oz)   23 103 kg (226 lb 6.4 oz)   22 101 kg (223 lb)   22 103 kg (226 lb 3.2 oz)   22 103 kg (226 lb 12.8 oz)   22 102 kg (224 lb 9.6 oz)   22 103 kg (227 lb 11.2 oz)   22 102 kg (225 lb)   22 102 kg (224 lb)   22 100 kg  (220 lb 9.6 oz)   08/30/22 93 kg (205 lb)     Excess Weight:  Onset:   Since first pregnancy in 2013   Highest weight: 240/250 (2022)  Current weight: 230  What has been tried: Diet and Exercise and Commercial Weight Loss Programs-ie. Weight Watchers, Leslie Gaurang, Nutrisystem, etc.        Food logging:  B:  skips or yogurt  S: skip  L: sandwich, salads at cafeteria    S: skips  D: tacos, salads, protein (chicken, steak, salmon), veggies (broccoli, carrots) + starch (corn, rice)  S: skips      Dining out: 1-2x  Hydration: Water - 80+ oz    Coffee 1 cup, creamer    Rare soda  Alcohol: Socially. 0-1 drink/month  Smoking: none  Exercise: None, restricted due to arthralgia  Occupation: MA at [employer]      Past Medical History:   Diagnosis Date    Allergic     Anxiety     Depression     Hypertension     SVT (supraventricular tachycardia)      Patient denies personal and family history of  pancreatitis, thyroid cancer, MEN-2 tumors.  Denies any hx of glaucoma, seizures, kidney stones, gallstones.  + hx of SVT  Denies uncontrolled anxiety or depression, suicidal behavior or thinking , insomnia or sleep disturbance.     Past Surgical History:   Procedure Laterality Date    CARDIAC ELECTROPHYSIOLOGY PROCEDURE N/A 1/30/2023    Procedure: Cardiac eps/svt ablation;  Surgeon: José Loving DO;  Location: [facility];  Service: Cardiology    IR BIOPSY LIVER RANDOM  2/9/2023     The following portions of the patient's history were reviewed and updated as appropriate: allergies, current medications, past family history, past medical history, past social history, past surgical history, and problem list.    Review Of Systems:  Review of Systems   Constitutional:  Negative for fatigue and fever.   HENT:  Negative for sore throat, trouble swallowing and voice change.    Respiratory:  Negative for shortness of breath.    Cardiovascular:  Negative for chest pain.   Gastrointestinal:  Positive for constipation  "(every other day). Negative for abdominal pain, diarrhea, nausea and vomiting.   Endocrine: Negative for cold intolerance and heat intolerance.   Genitourinary:  Negative for difficulty urinating.   Musculoskeletal:  Negative for arthralgias and back pain.   Psychiatric/Behavioral:  Negative for suicidal ideas. The patient is not nervous/anxious.    All other systems reviewed and are negative.      Objective:  /60   Pulse 93   Temp 97.8 °F (36.6 °C) (Tympanic)   Ht 5' 9\" (1.753 m)   Wt 104 kg (230 lb 3.2 oz)   BMI 33.99 kg/m²   Physical Exam    Labs and Imaging  Recent labs and imaging have been personally reviewed.  Lab Results   Component Value Date    WBC 8.03 03/27/2024    HGB 14.5 03/27/2024    HCT 43.9 03/27/2024    MCV 88 03/27/2024     03/27/2024     Lab Results   Component Value Date    SODIUM 139 04/24/2024    K 4.0 04/24/2024     04/24/2024    CO2 24 04/24/2024    AGAP 10 04/24/2024    BUN 11 04/24/2024    CREATININE 0.59 (L) 04/24/2024    GLUC 49 (LL) 04/24/2024    GLUF 94 03/27/2024    CALCIUM 9.5 04/24/2024    AST 93 (H) 04/24/2024     (H) 04/24/2024    ALKPHOS 62 04/24/2024    TP 7.5 04/24/2024    TBILI 0.53 04/24/2024    EGFR 121 04/24/2024     Lab Results   Component Value Date    HGBA1C 5.7 (H) 03/27/2024     Lab Results   Component Value Date    XNB9DVICMAOH 1.541 03/27/2024    TSH 1.53 04/09/2022     Lab Results   Component Value Date    CHOLESTEROL 155 03/27/2024     Lab Results   Component Value Date    HDL 25 (L) 03/27/2024     Lab Results   Component Value Date    TRIG 155 (H) 03/27/2024     Lab Results   Component Value Date    LDLCALC 99 03/27/2024       "

## 2024-05-21 ENCOUNTER — APPOINTMENT (OUTPATIENT)
Dept: LAB | Facility: CLINIC | Age: 34
End: 2024-05-21
Payer: COMMERCIAL

## 2024-05-21 DIAGNOSIS — R74.01 ELEVATION OF LEVELS OF LIVER TRANSAMINASE LEVELS: ICD-10-CM

## 2024-05-21 DIAGNOSIS — M54.50 LOW BACK PAIN, UNSPECIFIED BACK PAIN LATERALITY, UNSPECIFIED CHRONICITY, UNSPECIFIED WHETHER SCIATICA PRESENT: ICD-10-CM

## 2024-05-21 DIAGNOSIS — K76.0 FATTY LIVER: ICD-10-CM

## 2024-05-21 LAB
ALBUMIN SERPL BCP-MCNC: 4 G/DL (ref 3.5–5)
ALP SERPL-CCNC: 61 U/L (ref 34–104)
ALT SERPL W P-5'-P-CCNC: 98 U/L (ref 7–52)
ANION GAP SERPL CALCULATED.3IONS-SCNC: 10 MMOL/L (ref 4–13)
AST SERPL W P-5'-P-CCNC: 58 U/L (ref 13–39)
BILIRUB SERPL-MCNC: 0.42 MG/DL (ref 0.2–1)
BUN SERPL-MCNC: 10 MG/DL (ref 5–25)
CALCIUM SERPL-MCNC: 8.8 MG/DL (ref 8.4–10.2)
CHLORIDE SERPL-SCNC: 107 MMOL/L (ref 96–108)
CO2 SERPL-SCNC: 22 MMOL/L (ref 21–32)
CREAT SERPL-MCNC: 0.53 MG/DL (ref 0.6–1.3)
CRP SERPL QL: 11.5 MG/L
CRYOGLOB RF SER-ACNC: ABNORMAL [IU]/ML
ERYTHROCYTE [SEDIMENTATION RATE] IN BLOOD: 13 MM/HOUR (ref 0–19)
GFR SERPL CREATININE-BSD FRML MDRD: 126 ML/MIN/1.73SQ M
GLUCOSE P FAST SERPL-MCNC: 95 MG/DL (ref 65–99)
POTASSIUM SERPL-SCNC: 4.4 MMOL/L (ref 3.5–5.3)
PROT SERPL-MCNC: 7.1 G/DL (ref 6.4–8.4)
RHEUMATOID FACT SER QL LA: POSITIVE
SODIUM SERPL-SCNC: 139 MMOL/L (ref 135–147)

## 2024-05-21 PROCEDURE — 86430 RHEUMATOID FACTOR TEST QUAL: CPT

## 2024-05-21 PROCEDURE — 86235 NUCLEAR ANTIGEN ANTIBODY: CPT

## 2024-05-21 PROCEDURE — 85652 RBC SED RATE AUTOMATED: CPT

## 2024-05-21 PROCEDURE — 80053 COMPREHEN METABOLIC PANEL: CPT

## 2024-05-21 PROCEDURE — 36415 COLL VENOUS BLD VENIPUNCTURE: CPT

## 2024-05-21 PROCEDURE — 86140 C-REACTIVE PROTEIN: CPT

## 2024-05-21 PROCEDURE — 86431 RHEUMATOID FACTOR QUANT: CPT

## 2024-05-22 LAB
CENTROMERE B AB SER-ACNC: <0.2 AI (ref 0–0.9)
ENA SCL70 AB SER-ACNC: <0.2 AI (ref 0–0.9)

## 2024-05-29 ENCOUNTER — OFFICE VISIT (OUTPATIENT)
Dept: FAMILY MEDICINE CLINIC | Facility: CLINIC | Age: 34
End: 2024-05-29
Payer: COMMERCIAL

## 2024-05-29 VITALS
HEIGHT: 69 IN | TEMPERATURE: 98 F | RESPIRATION RATE: 16 BRPM | OXYGEN SATURATION: 97 % | HEART RATE: 115 BPM | DIASTOLIC BLOOD PRESSURE: 74 MMHG | SYSTOLIC BLOOD PRESSURE: 132 MMHG | WEIGHT: 229.4 LBS | BODY MASS INDEX: 33.98 KG/M2

## 2024-05-29 DIAGNOSIS — F41.9 ANXIETY: ICD-10-CM

## 2024-05-29 DIAGNOSIS — Z00.00 HEALTHCARE MAINTENANCE: ICD-10-CM

## 2024-05-29 DIAGNOSIS — I10 HYPERTENSION, UNSPECIFIED TYPE: ICD-10-CM

## 2024-05-29 DIAGNOSIS — R05.8 OTHER COUGH: ICD-10-CM

## 2024-05-29 DIAGNOSIS — R73.9 HYPERGLYCEMIA: ICD-10-CM

## 2024-05-29 DIAGNOSIS — I47.10 SVT (SUPRAVENTRICULAR TACHYCARDIA): ICD-10-CM

## 2024-05-29 DIAGNOSIS — F32.A DEPRESSION, UNSPECIFIED DEPRESSION TYPE: ICD-10-CM

## 2024-05-29 DIAGNOSIS — H66.001 NON-RECURRENT ACUTE SUPPURATIVE OTITIS MEDIA OF RIGHT EAR WITHOUT SPONTANEOUS RUPTURE OF TYMPANIC MEMBRANE: Primary | ICD-10-CM

## 2024-05-29 DIAGNOSIS — K75.81 NASH (NONALCOHOLIC STEATOHEPATITIS): ICD-10-CM

## 2024-05-29 PROBLEM — R05.9 COUGH: Status: ACTIVE | Noted: 2024-05-29

## 2024-05-29 PROCEDURE — 99395 PREV VISIT EST AGE 18-39: CPT | Performed by: NURSE PRACTITIONER

## 2024-05-29 PROCEDURE — 87636 SARSCOV2 & INF A&B AMP PRB: CPT | Performed by: NURSE PRACTITIONER

## 2024-05-29 PROCEDURE — 99214 OFFICE O/P EST MOD 30 MIN: CPT | Performed by: NURSE PRACTITIONER

## 2024-05-29 RX ORDER — PREDNISONE 50 MG/1
50 TABLET ORAL DAILY
Qty: 5 TABLET | Refills: 0 | Status: SHIPPED | OUTPATIENT
Start: 2024-05-29 | End: 2024-06-03

## 2024-05-29 RX ORDER — BENZONATATE 200 MG/1
200 CAPSULE ORAL 3 TIMES DAILY PRN
Qty: 20 CAPSULE | Refills: 0 | Status: SHIPPED | OUTPATIENT
Start: 2024-05-29

## 2024-05-29 RX ORDER — AMOXICILLIN AND CLAVULANATE POTASSIUM 875; 125 MG/1; MG/1
1 TABLET, FILM COATED ORAL EVERY 12 HOURS SCHEDULED
Qty: 20 TABLET | Refills: 0 | Status: SHIPPED | OUTPATIENT
Start: 2024-05-29 | End: 2024-06-08

## 2024-05-29 NOTE — PROGRESS NOTES
Ambulatory Visit  Name: Zina Hdz      : 1990      MRN: [id number]  Encounter Provider: MONTY Horowitz  Encounter Date: 2024   Encounter department: [facility]    Assessment & Plan   1. Non-recurrent acute suppurative otitis media of right ear without spontaneous rupture of tympanic membrane  Assessment & Plan:  - Prescription sent for Augmentin. Advised of side effects.   - Recommend Flonase.   - Tylenol or Motrin for ear pain.   Orders:  -     amoxicillin-clavulanate (AUGMENTIN) 875-125 mg per tablet; Take 1 tablet by mouth every 12 (twelve) hours for 10 days  2. Other cough  Assessment & Plan:  - Prescriptions sent for prednisone and tessalon perles. Advised of side effects.  - Recommend Flonase.   - Increase oral hydration and use humidifier.   Orders:  -     Covid/Flu- Office Collect Normal; Future  -     Covid/Flu- Office Collect Normal  -     predniSONE 50 mg tablet; Take 1 tablet (50 mg total) by mouth daily for 5 days  -     benzonatate (TESSALON) 200 MG capsule; Take 1 capsule (200 mg total) by mouth 3 (three) times a day as needed for cough  3. Hypertension, unspecified type  Assessment & Plan:  - Well controlled on lisinopril 10 mg daily. Continue same.   - Will continue to monitor.   4. RIVER (nonalcoholic steatohepatitis)  Assessment & Plan:  - Confirmed by liver biopsy 2023. Chronically elevated LFTs.   - Encourage low fat diet and regular exercise.  - Continue routine follow up with GI/Hepatology.  5. SVT (supraventricular tachycardia)  Assessment & Plan:  - s/p SVT ablation.   - Continue routine follow up with Cardiology.  6. Hyperglycemia  Assessment & Plan:  - Hemoglobin A1c 5.7.   - Encourage low carb diet and regular exercise.  - Will continue to monitor fasting glucose and A1c.   7. Depression, unspecified depression type  Assessment & Plan:  - Stable on Celexa 20 mg daily. Continue same.   - Will continue to monitor.      8.  Anxiety  Assessment & Plan:  - Stable on Celexa 20 mg daily. Continue same.   - Will continue to monitor.      9. Healthcare maintenance  Assessment & Plan:  - Reviewed immunizations and screenings.   - UTD with dental, vision, and GYN exams.   - Recent lab results reviewed.        History of Present Illness   {Disappearing Hyperlinks I Encounters * My Last Note * Since Last Visit * History :82150}  Patient presents to office today with complaints of cough, congestion, fatigue, chills, and body aches. Also complains of right ear pain. Symptoms started on Saturday. She has been out of work. Denies any other concerns or complaints today.           Review of Systems   Constitutional:  Positive for fatigue. Negative for fever.   HENT:  Positive for congestion, ear pain (right) and rhinorrhea. Negative for trouble swallowing.    Eyes:  Negative for visual disturbance.   Respiratory:  Positive for cough. Negative for shortness of breath.    Cardiovascular:  Negative for chest pain and palpitations.   Gastrointestinal:  Negative for abdominal pain and blood in stool.   Endocrine: Negative for cold intolerance and heat intolerance.   Genitourinary:  Negative for difficulty urinating and dysuria.   Musculoskeletal:  Positive for myalgias. Negative for gait problem.   Skin:  Negative for rash.   Neurological:  Negative for dizziness, syncope and headaches.   Hematological:  Negative for adenopathy.   Psychiatric/Behavioral:  Negative for behavioral problems.      Past Medical History:   Diagnosis Date   • Allergic    • Anxiety    • Depression    • Hypertension    • SVT (supraventricular tachycardia)      Past Surgical History:   Procedure Laterality Date   • CARDIAC ELECTROPHYSIOLOGY PROCEDURE N/A 1/30/2023    Procedure: Cardiac eps/svt ablation;  Surgeon: José Loving DO;  [facility];  Service: Cardiology   • IR BIOPSY LIVER RANDOM  2/9/2023     Family History   Problem Relation Age of Onset   •  Depression Mother    • Thyroid disease Mother    • Hypertension Mother    • Depression Father    • Depression Maternal Grandmother    • Thyroid disease Maternal Grandmother    • Hypertension Maternal Grandmother      Social History     Tobacco Use   • Smoking status: Never     Passive exposure: Never   • Smokeless tobacco: Never   Vaping Use   • Vaping status: Never Used   Substance and Sexual Activity   • Alcohol use: Yes     Alcohol/week: 2.0 standard drinks of alcohol     Types: 2 Glasses of wine per week     Comment: socially   • Drug use: Never   • Sexual activity: Not on file     Current Outpatient Medications on File Prior to Visit   Medication Sig   • Blood Glucose Monitoring Suppl (OneTouch Verio Reflect) w/Device KIT Check blood sugars once daily. Please substitute with appropriate alternative as covered by patient's insurance. Dx: E11.65   • citalopram (CeleXA) 20 mg tablet Take 1 tablet (20 mg total) by mouth daily   • clotrimazole-betamethasone (LOTRISONE) 1-0.05 % cream Apply topically 2 (two) times a day   • glucose blood (OneTouch Verio) test strip Check blood sugars once daily. Please substitute with appropriate alternative as covered by patient's insurance. Dx: E11.65   • ketorolac (TORADOL) 10 mg tablet Take 1 tablet (10 mg total) by mouth every 6 (six) hours as needed for moderate pain   • lisinopril (ZESTRIL) 10 mg tablet Take 1 tablet (10 mg total) by mouth daily   • montelukast (SINGULAIR) 10 mg tablet Take 1 tablet (10 mg total) by mouth daily at bedtime   • nystatin (MYCOSTATIN) powder Apply topically 3 (three) times a day   • OneTouch Delica Lancets 33G MISC Check blood sugars once daily. Please substitute with appropriate alternative as covered by patient's insurance. Dx: E11.65   • prochlorperazine (COMPAZINE) 10 mg tablet Take 1 tablet (10 mg total) by mouth every 6 (six) hours as needed for nausea or vomiting   • traZODone (DESYREL) 50 mg tablet Take 1 tablet (50 mg total) by mouth  "daily at bedtime as needed for sleep   • famotidine (PEPCID) 20 mg tablet Take 1 tablet (20 mg total) by mouth 2 (two) times a day for 12 days   • ondansetron (ZOFRAN) 4 mg tablet Take 1 tablet (4 mg total) by mouth every 8 (eight) hours as needed for nausea or vomiting for up to 10 days   • Semaglutide-Weight Management (WEGOVY) 0.25 MG/0.5ML Inject 0.5 mL (0.25 mg total) under the skin once a week (Patient not taking: Reported on 5/29/2024)     No Known Allergies  Immunization History   Administered Date(s) Administered   • COVID-19 PFIZER VACCINE 0.3 ML IM 06/01/2021, 06/22/2021   • COVID-19 Pfizer vac (Surya-sucrose, gray cap) 12 yr+ IM 01/14/2022   • DTaP 5 1990, 1990, 1990, 1992, 1992   • HPV Quadrivalent 04/21/2008, 07/02/2008   • HPV9 02/21/2020   • Hep B, adult 1990, 1990, 06/22/1993   • Hib (PRP-OMP) 1990, 1990, 1992, 09/09/1993   • INFLUENZA 10/04/2016, 10/03/2017, 10/25/2021   • IPV 1990, 1992, 09/09/1993, 01/20/1998   • Influenza Quadrivalent Preservative Free 3 years and older IM 10/22/2018, 10/23/2019, 10/23/2020   • MMR 06/22/1993, 01/20/1998   • Meningococcal MCV4P 04/21/2008   • Meningococcal, Unknown Serogroups 04/21/2008   • Td (adult), adsorbed 1992   • Tdap 02/27/2017   • Varicella Zoster Immune Globulin 01/07/2000     Objective   {Disappearing Hyperlinks   Review Vitals * Enter New Vitals * Results Review * Labs * Imaging * Cardiology * Procedures * Lung Cancer Screening :81798}  /74 (BP Location: Left arm, Patient Position: Sitting, Cuff Size: Large)   Pulse (!) 115   Temp 98 °F (36.7 °C) (Tympanic)   Resp 16   Ht 5' 9\" (1.753 m)   Wt 104 kg (229 lb 6.4 oz)   SpO2 97%   BMI 33.88 kg/m²     Physical Exam  Vitals and nursing note reviewed.   Constitutional:       General: She is not in acute distress.     Appearance: Normal appearance. She is well-developed. She is ill-appearing.   HENT:      Head: " Normocephalic and atraumatic.      Right Ear: External ear normal. Tympanic membrane is erythematous.      Left Ear: Tympanic membrane, ear canal and external ear normal.      Nose: Congestion present.      Mouth/Throat:      Mouth: Mucous membranes are moist.   Eyes:      Conjunctiva/sclera: Conjunctivae normal.   Cardiovascular:      Rate and Rhythm: Normal rate and regular rhythm.      Heart sounds: Normal heart sounds.   Pulmonary:      Effort: Pulmonary effort is normal.      Breath sounds: Normal breath sounds.   Abdominal:      General: Bowel sounds are normal.      Palpations: Abdomen is soft.   Musculoskeletal:         General: Normal range of motion.      Cervical back: Normal range of motion.   Lymphadenopathy:      Cervical: No cervical adenopathy.   Skin:     General: Skin is warm and dry.   Neurological:      Mental Status: She is alert and oriented to person, place, and time.   Psychiatric:         Mood and Affect: Mood normal.         Behavior: Behavior normal.       Administrative Statements {Disappearing Hyperlinks I  [facility]}

## 2024-05-29 NOTE — ASSESSMENT & PLAN NOTE
- Prescriptions sent for prednisone and tessalon perles. Advised of side effects.  - Recommend Flonase.   - Increase oral hydration and use humidifier.

## 2024-05-29 NOTE — ASSESSMENT & PLAN NOTE
- Prescription sent for Augmentin. Advised of side effects.   - Recommend Flonase.   - Tylenol or Motrin for ear pain.

## 2024-05-29 NOTE — ASSESSMENT & PLAN NOTE
- Confirmed by liver biopsy February 2023. Chronically elevated LFTs.   - Encourage low fat diet and regular exercise.  - Continue routine follow up with GI/Hepatology.

## 2024-05-29 NOTE — ASSESSMENT & PLAN NOTE
- Reviewed immunizations and screenings.   - UTD with dental, vision, and GYN exams.   - Recent lab results reviewed.

## 2024-05-30 LAB
FLUAV RNA RESP QL NAA+PROBE: NEGATIVE
FLUBV RNA RESP QL NAA+PROBE: NEGATIVE
SARS-COV-2 RNA RESP QL NAA+PROBE: NEGATIVE

## 2024-05-31 ENCOUNTER — HOSPITAL ENCOUNTER (OUTPATIENT)
Dept: RADIOLOGY | Facility: HOSPITAL | Age: 34
Discharge: HOME | End: 2024-05-31
Attending: INTERNAL MEDICINE
Payer: COMMERCIAL

## 2024-05-31 ENCOUNTER — TELEPHONE (OUTPATIENT)
Dept: FAMILY MEDICINE CLINIC | Facility: CLINIC | Age: 34
End: 2024-05-31

## 2024-05-31 DIAGNOSIS — M79.643 HAND PAIN: ICD-10-CM

## 2024-05-31 DIAGNOSIS — M25.539 WRIST PAIN: ICD-10-CM

## 2024-05-31 PROCEDURE — 76882 US LMTD JT/FCL EVL NVASC XTR: CPT

## 2024-05-31 NOTE — TELEPHONE ENCOUNTER
Pt aware of results     Pt stated she is having vertigo and would like to know if you can send something in to the pharmacy. Please advise

## 2024-06-03 DIAGNOSIS — R42 VERTIGO: Primary | ICD-10-CM

## 2024-06-03 RX ORDER — MECLIZINE HYDROCHLORIDE 25 MG/1
25 TABLET ORAL 3 TIMES DAILY PRN
Qty: 30 TABLET | Refills: 0 | Status: SHIPPED | OUTPATIENT
Start: 2024-06-03

## 2024-06-10 DIAGNOSIS — R10.9 ABDOMINAL PAIN: ICD-10-CM

## 2024-06-11 ENCOUNTER — TELEPHONE (OUTPATIENT)
Age: 34
End: 2024-06-11

## 2024-06-11 NOTE — TELEPHONE ENCOUNTER
PA for Wegovy 0.25mg    Submitted via    [x]CMM-KEY [id number]  [][email or web address] Mailing-Case ID #    []Faxed to plan   []Other website    []Phone call Case ID #      Office notes sent, clinical questions answered. Awaiting determination    Turnaround time for your insurance to make a decision on your Prior Authorization can take 7-21 business days.

## 2024-06-11 NOTE — TELEPHONE ENCOUNTER
Refills have been requested for the following medications:         ondansetron (ZOFRAN) 4 mg tablet     [place] [facility] [place] [address] [place] [address] A    Last seen 5/29/24  Has appt 7/16/24

## 2024-06-11 NOTE — TELEPHONE ENCOUNTER
"[facility] called clarifying PA for Wegovy 0.25mg.    Per rep, \"yes\" was selected on PA for question asking if prescribed dose is the \"maintenance dose.\" Wegovy maintenance doses are 1.7mg & 2.4mg. Per rep, PA needs to be resubmitted due to discrepancy.     Determination letter to be faxed.  "

## 2024-06-12 RX ORDER — ONDANSETRON 4 MG/1
4 TABLET, FILM COATED ORAL EVERY 8 HOURS PRN
Qty: 20 TABLET | Refills: 0 | Status: SHIPPED | OUTPATIENT
Start: 2024-06-12 | End: 2024-06-22

## 2024-06-12 NOTE — TELEPHONE ENCOUNTER
PA for Wegovy 0.25mg Denied    Reason:(Screenshot if applicable)        Message sent to office clinical pool Yes    Denial letter scanned into Media Yes    Appeal started No- reconsideration ( Provider will need to decide if appeal is warranted and send clinical documentation to PA team for initiation.)    **Please follow up with your patient regarding denial and next steps**

## 2024-06-12 NOTE — TELEPHONE ENCOUNTER
PA reconsideration initiated w/ [facility]. Insurance needed clarification on why patient was newly starting therapy again. Faxed clinicals over to . Urgent request will result in 9-72 hrs. Called . Call ref #: [id number], rep Roula.

## 2024-06-13 NOTE — TELEPHONE ENCOUNTER
PA for wegovy Approved     Date(s) approved 6/12/25    Case #     Patient advised by          [x] MyChart Message  [] Phone call   []LMOM  []L/M to call office as no active Communication consent on file  []Unable to leave detailed message as VM not approved on Communication consent       Pharmacy advised by    [x]Fax  []Phone call    Approval letter scanned into Media Yes

## 2024-06-28 DIAGNOSIS — E66.9 OBESITY, CLASS I, BMI 30-34.9: Primary | ICD-10-CM

## 2024-06-28 DIAGNOSIS — M25.519 ACUTE SHOULDER PAIN, UNSPECIFIED LATERALITY: Primary | ICD-10-CM

## 2024-06-28 PROBLEM — H66.001 NON-RECURRENT ACUTE SUPPURATIVE OTITIS MEDIA OF RIGHT EAR WITHOUT SPONTANEOUS RUPTURE OF TYMPANIC MEMBRANE: Status: RESOLVED | Noted: 2024-05-29 | Resolved: 2024-06-28

## 2024-06-28 PROBLEM — R05.9 COUGH: Status: RESOLVED | Noted: 2024-05-29 | Resolved: 2024-06-28

## 2024-06-28 PROBLEM — Z00.00 HEALTHCARE MAINTENANCE: Status: RESOLVED | Noted: 2024-05-29 | Resolved: 2024-06-28

## 2024-06-28 RX ORDER — METHOCARBAMOL 500 MG/1
500 TABLET, FILM COATED ORAL 3 TIMES DAILY PRN
Qty: 30 TABLET | Refills: 1 | Status: SHIPPED | OUTPATIENT
Start: 2024-06-28

## 2024-06-30 DIAGNOSIS — I10 HYPERTENSION, UNSPECIFIED TYPE: ICD-10-CM

## 2024-07-01 RX ORDER — LISINOPRIL 10 MG/1
10 TABLET ORAL DAILY
Qty: 90 TABLET | Refills: 1 | Status: SHIPPED | OUTPATIENT
Start: 2024-07-01

## 2024-07-03 ENCOUNTER — APPOINTMENT (OUTPATIENT)
Dept: LAB | Facility: CLINIC | Age: 34
End: 2024-07-03
Payer: COMMERCIAL

## 2024-07-03 DIAGNOSIS — K76.0 FATTY LIVER: ICD-10-CM

## 2024-07-03 DIAGNOSIS — R74.01 ELEVATION OF LEVELS OF LIVER TRANSAMINASE LEVELS: ICD-10-CM

## 2024-07-03 LAB
ALBUMIN SERPL BCG-MCNC: 4.2 G/DL (ref 3.5–5)
ALP SERPL-CCNC: 68 U/L (ref 34–104)
ALT SERPL W P-5'-P-CCNC: 85 U/L (ref 7–52)
ANION GAP SERPL CALCULATED.3IONS-SCNC: 8 MMOL/L (ref 4–13)
AST SERPL W P-5'-P-CCNC: 44 U/L (ref 13–39)
BILIRUB SERPL-MCNC: 0.45 MG/DL (ref 0.2–1)
BUN SERPL-MCNC: 10 MG/DL (ref 5–25)
CALCIUM SERPL-MCNC: 9.2 MG/DL (ref 8.4–10.2)
CHLORIDE SERPL-SCNC: 103 MMOL/L (ref 96–108)
CO2 SERPL-SCNC: 26 MMOL/L (ref 21–32)
CREAT SERPL-MCNC: 0.6 MG/DL (ref 0.6–1.3)
GFR SERPL CREATININE-BSD FRML MDRD: 120 ML/MIN/1.73SQ M
GLUCOSE SERPL-MCNC: 103 MG/DL (ref 65–140)
POTASSIUM SERPL-SCNC: 4 MMOL/L (ref 3.5–5.3)
PROT SERPL-MCNC: 7.2 G/DL (ref 6.4–8.4)
SODIUM SERPL-SCNC: 137 MMOL/L (ref 135–147)

## 2024-07-03 PROCEDURE — 80053 COMPREHEN METABOLIC PANEL: CPT

## 2024-07-03 PROCEDURE — 36415 COLL VENOUS BLD VENIPUNCTURE: CPT

## 2024-07-09 ENCOUNTER — HOSPITAL ENCOUNTER (OUTPATIENT)
Dept: RADIOLOGY | Facility: HOSPITAL | Age: 34
Discharge: HOME | End: 2024-07-09
Payer: COMMERCIAL

## 2024-07-09 ENCOUNTER — APPOINTMENT (OUTPATIENT)
Dept: RADIOLOGY | Facility: HOSPITAL | Age: 34
End: 2024-07-09
Payer: COMMERCIAL

## 2024-07-09 DIAGNOSIS — F41.9 ANXIETY: ICD-10-CM

## 2024-07-09 DIAGNOSIS — M54.2 NECK PAIN: ICD-10-CM

## 2024-07-09 DIAGNOSIS — M79.604 RIGHT LEG PAIN: Primary | ICD-10-CM

## 2024-07-09 DIAGNOSIS — M25.511 RIGHT SHOULDER PAIN, UNSPECIFIED CHRONICITY: ICD-10-CM

## 2024-07-09 PROCEDURE — 72050 X-RAY EXAM NECK SPINE 4/5VWS: CPT

## 2024-07-09 PROCEDURE — 73030 X-RAY EXAM OF SHOULDER: CPT

## 2024-07-10 RX ORDER — CITALOPRAM 20 MG/1
20 TABLET ORAL DAILY
Qty: 100 TABLET | Refills: 1 | Status: SHIPPED | OUTPATIENT
Start: 2024-07-10

## 2024-07-12 ENCOUNTER — TELEPHONE (OUTPATIENT)
Dept: FAMILY MEDICINE CLINIC | Facility: CLINIC | Age: 34
End: 2024-07-12

## 2024-07-12 NOTE — TELEPHONE ENCOUNTER
----- Message from MONTY Wolfe sent at 7/11/2024  2:53 PM EDT -----  Xray of shoulder was normal.  Xray of cervical spine showed findings consistent with possible muscle spasm. Was otherwise unremarkable. Would recommend continuing robaxin as needed. She may also benefit from PT if she wishes.

## 2024-07-22 ENCOUNTER — OFFICE VISIT (OUTPATIENT)
Dept: GASTROENTEROLOGY | Facility: CLINIC | Age: 34
End: 2024-07-22
Payer: COMMERCIAL

## 2024-07-22 VITALS
TEMPERATURE: 97.1 F | HEART RATE: 78 BPM | HEIGHT: 69 IN | BODY MASS INDEX: 33.77 KG/M2 | SYSTOLIC BLOOD PRESSURE: 110 MMHG | OXYGEN SATURATION: 97 % | WEIGHT: 228 LBS | DIASTOLIC BLOOD PRESSURE: 70 MMHG

## 2024-07-22 DIAGNOSIS — R79.89 ELEVATED LFTS: ICD-10-CM

## 2024-07-22 DIAGNOSIS — E66.09 CLASS 1 OBESITY DUE TO EXCESS CALORIES WITHOUT SERIOUS COMORBIDITY WITH BODY MASS INDEX (BMI) OF 33.0 TO 33.9 IN ADULT: ICD-10-CM

## 2024-07-22 DIAGNOSIS — K59.04 CHRONIC IDIOPATHIC CONSTIPATION: ICD-10-CM

## 2024-07-22 DIAGNOSIS — K75.81 NASH (NONALCOHOLIC STEATOHEPATITIS): Primary | ICD-10-CM

## 2024-07-22 PROCEDURE — 99214 OFFICE O/P EST MOD 30 MIN: CPT | Performed by: INTERNAL MEDICINE

## 2024-07-22 RX ORDER — MELOXICAM 7.5 MG/1
7.5 TABLET ORAL DAILY
COMMUNITY

## 2024-07-22 NOTE — PROGRESS NOTES
[facility]  Outpatient Hepatology Follow-up  Encounter: [id number]    PATIENT INFO     Name: Zina Hdz  YOB: 1990   Age: 34 y.o.   Sex: female   MRN: [id number]    ASSESSMENT & PLAN     Problems Addressed this Visit:  1. RIVER (nonalcoholic steatohepatitis)    2. Elevated LFTs    3. Class 1 obesity due to excess calories without serious comorbidity with body mass index (BMI) of 33.0 to 33.9 in adult    4. Chronic idiopathic constipation      Orders Placed This Encounter   Procedures    Comprehensive metabolic panel     # Nonalcoholic Steatohepatitis: Patient with a longstanding history of steatohepatitis.  Previously diagnosed after patient noted to have consistent elevation in liver enzymes.  Previous ultrasound elastography from 12/2022 showing S3 steatosis with F0 to F1 fibrosis.  This was further confirmed by liver biopsy on 2/2023 which revealed steatohepatitis.  Patient does admit to prior history of alcohol consumption which could have contributed to secondary fat accumulation however she has had an otherwise negative serologic workup for other forms of chronic liver disease in the past.      Patient has been following with weight management and has been working on dietary modifications.  Weight remains stable at this time at 228 pounds but patient did start Wegovy last month and does believe that this has helped with her overall appetite and to curb her cravings.  LFTs fortunately improving as well as a result.  On 7/30/2024 blood work, patient found to have an AST/ALT 40/85, alkaline phosphatase 60, total bilirubin 0.45.  Overall, recommend continued lifestyle modifications with diet and exercise in order to promote overall weight loss.  Strongly recommend ongoing follow-up with weight management.    Plan:  Continued monitoring of LFTs every 3 months   Plan for repeat US Elastography in x1 year  Ongoing follow-up with weight management, continue  Wegovy  [facility] health maintenance recommendations:   Weight loss with goal of 7-10% of body weight; recommend daily exercise   Optimization of blood pressure and blood glucose control   Abstain from all alcohol use  Vaccinations for both Hepatitis A and B along with Pneumococcal and year Influenza    # Chronic Idiopathic Constipation: Patient admits to daily difficulty with bowel movements.  Admits to having hard stool with straining to defecate.  Did have 1 episode where she was found to have bright red blood per rectum in the toilet bowl likely secondary to hemorrhoids.  Currently takes over-the-counter Colace.  Admits to being on MiraLAX in the past with no relief and stated that prior Linzess 145 mcg was too powerful for patient.  Did have a colonoscopy completed last year which revealed diverticula and internal hemorrhoids.    Plan:  Encouraged to restart MiraLAX 17 g daily, can increase to 17 g twice daily if needed  Instructed to increase daily water and fiber intake  Can consider trial of lower dose Linzess 72 mcg daily if MiraLAX ineffective  No indication for repeat colonoscopy currently    I have spent a total time of 30 minutes in caring for this patient on the day of the visit/encounter including Diagnostic results, Prognosis, Risks and benefits of tx options, Instructions for management, Patient and family education, Importance of tx compliance, Risk factor reductions, Impressions, Counseling / Coordination of care, Documenting in the medical record, Reviewing / ordering tests, medicine, procedures  , and Obtaining or reviewing history  .    FOLLOW-UP: Schedule a follow-up visit in 9 months    HISTORY OF PRESENT ILLNESS       Zina Hdz is a 34 y.o. female who presents to GI office for follow up visit. Patient with a PMHx of RIVER, prior SBO, prior SVT S/P ablation, HTN, HLD, and depression/anxiety.     Patient was last seen by hepatology on 4/26/2023 for a telemedicine visit.  At that time,  discussed lifestyle modifications due to steatohepatitis.  LFTs previously elevated to 3-4 times upper limits of normal.  Most recent lab work completed on 7/3/2024 showing continued elevation.  AST/ALT 40/85, alkaline phosphatase 60, total bilirubin 0.43.  She has been following up with weight management and was recently restarted on Wegovy.    Patient states that she is doing well overall during today's office visit.  States that she has been working to completely cut out alcohol from her daily routine.  Does admit to prior daily alcohol use.  Also has been following up with weight management.  Recently started on Wegovy and approximately 1 month ago.  States that she has been unable to lose weight.  However, she believes that the Wegovy has helped to curb her cravings and decreased her appetite.  She admits to attempting to cut out all sugar/carbs from her diet.  She does state that she has not been incorporating daily exercise into her routine.  Admits that she has no time with school and her kids accompanied by her job requirements.     ENDOSCOPIC HISTORY     UPPER ENDOSCOPY: None    COLONOSCOPY: 4/28/23 - Few scattered, small diverticula in the ascending, transverse, and descending colon. Small, internal hemorrhoids.     REVIEW OF SYSTEMS     CONSTITUTIONAL: Denies any fever, chills, rigors, and weight loss  HEENT: No earache or tinnitus, denies hearing loss or visual disturbances  CARDIOVASCULAR: No chest pain or palpitations  RESPIRATORY: Denies any cough, hemoptysis, shortness of breath or dyspnea on exertion  GASTROINTESTINAL: As noted in the History of Present Illness  GENITOURINARY: No problems with urination, denies any hematuria or dysuria  NEUROLOGIC: No dizziness or vertigo, denies headaches   MUSCULOSKELETAL: Denies any muscle or joint pain   SKIN: Denies skin rashes or itching  ENDOCRINE: Denies excessive thirst, denies intolerance to heat or cold  PSYCHOSOCIAL: Denies depression or anxiety, denies  any recent memory loss     Historical Information   Past Medical History:   Diagnosis Date    Allergic     Anxiety     Depression     Hypertension     SVT (supraventricular tachycardia)      Past Surgical History:   Procedure Laterality Date    CARDIAC ELECTROPHYSIOLOGY PROCEDURE N/A 1/30/2023    Procedure: Cardiac eps/svt ablation;  Surgeon: José Loving DO;  [facility];  Service: Cardiology    IR BIOPSY LIVER RANDOM  2/9/2023     Social History   Social History     Substance and Sexual Activity   Alcohol Use Yes    Alcohol/week: 2.0 standard drinks of alcohol    Types: 2 Glasses of wine per week    Comment: socially     Social History     Substance and Sexual Activity   Drug Use Never     Social History     Tobacco Use   Smoking Status Never    Passive exposure: Never   Smokeless Tobacco Never     Family History   Problem Relation Age of Onset    Depression Mother     Thyroid disease Mother     Hypertension Mother     Depression Father     Depression Maternal Grandmother     Thyroid disease Maternal Grandmother     Hypertension Maternal Grandmother          MEDICATIONS AND ALLERGIES     Current Outpatient Medications   Medication Instructions    benzonatate (TESSALON) 200 mg, Oral, 3 times daily PRN    Blood Glucose Monitoring Suppl (OneTouch Verio Reflect) w/Device KIT Check blood sugars once daily. Please substitute with appropriate alternative as covered by patient's insurance. Dx: E11.65    citalopram (CELEXA) 20 mg, Oral, Daily    clotrimazole-betamethasone (LOTRISONE) 1-0.05 % cream Topical, 2 times daily    famotidine (PEPCID) 20 mg, Oral, 2 times daily    glucose blood (OneTouch Verio) test strip Check blood sugars once daily. Please substitute with appropriate alternative as covered by patient's insurance. Dx: E11.65    ketorolac (TORADOL) 10 mg, Oral, Every 6 hours PRN    lisinopril (ZESTRIL) 10 mg, Oral, Daily    meclizine (ANTIVERT) 25 mg, Oral, 3 times daily PRN    meloxicam  "(MOBIC) 7.5 mg, Oral, Daily    methocarbamol (ROBAXIN) 500 mg, Oral, 3 times daily PRN    montelukast (SINGULAIR) 10 mg, Oral, Daily at bedtime    nystatin (MYCOSTATIN) powder Topical, 3 times daily    ondansetron (ZOFRAN) 4 mg, Oral, Every 8 hours PRN    OneTouch Delica Lancets 33G MISC Check blood sugars once daily. Please substitute with appropriate alternative as covered by patient's insurance. Dx: E11.65    prochlorperazine (COMPAZINE) 10 mg, Oral, Every 6 hours PRN    Semaglutide-Weight Management (WEGOVY) 0.5 mg, Subcutaneous, Weekly    traZODone (DESYREL) 50 mg, Oral, Daily at bedtime PRN     No Known Allergies    PHYSICAL EXAM      Objective   Blood pressure 110/70, pulse 78, temperature (!) 97.1 °F (36.2 °C), temperature source Tympanic, height 5' 9\" (1.753 m), weight 103 kg (228 lb), SpO2 97%, not currently breastfeeding. Body mass index is 33.67 kg/m².    General Appearance:   Alert, cooperative, no distress   HEENT:   Normocephalic, atraumatic, anicteric     Neck:   Supple, symmetrical, trachea midline   Lungs:   Equal chest rise, respirations unlabored    Heart:   Regular rate and rhythm   Abdomen:   Soft, non-tender, non-distended; normal bowel sounds; no masses, no organomegaly    Rectal:   Deferred    Extremities:   No cyanosis, clubbing or edema    Neuro:   Moves all 4 extremities    Skin:   No jaundice, rashes, or lesions      LABORATORY RESULTS     No visits with results within 1 Day(s) from this visit.   Latest known visit with results is:   Appointment on 07/03/2024   Component Date Value    Sodium 07/03/2024 137     Potassium 07/03/2024 4.0     Chloride 07/03/2024 103     CO2 07/03/2024 26     ANION GAP 07/03/2024 8     BUN 07/03/2024 10     Creatinine 07/03/2024 0.60     Glucose 07/03/2024 103     Calcium 07/03/2024 9.2     AST 07/03/2024 44 (H)     ALT 07/03/2024 85 (H)     Alkaline Phosphatase 07/03/2024 68     Total Protein 07/03/2024 7.2     Albumin 07/03/2024 4.2     Total Bilirubin " 07/03/2024 0.45     eGFR 07/03/2024 120      XR spine cervical complete 4 or 5 vw non injury    Result Date: 7/10/2024  Narrative: CERVICAL SPINE INDICATION:   Cervicalgia. COMPARISON: None. VIEWS:  XR SPINE CERVICAL COMPLETE 4 OR 5 VW NON INJURY FINDINGS: No fracture. Straightening of the normally expected cervical lordosis can be seen with muscle spasm. The intervertebral disc spaces are preserved. The neuroforamina are patent. The prevertebral soft tissues are within normal limits.  The lung apices are clear.     Impression: Straightening of the normally expected cervical lordosis can be seen with muscle spasm. Electronically signed: 07/10/2024 01:17 AM Cl Patterson MD    XR shoulder 2+ vw right    Result Date: 7/10/2024  Narrative: RIGHT SHOULDER INDICATION:   Pain in right shoulder. COMPARISON:  None. VIEWS:  XR SHOULDER 2+ VW RIGHT FINDINGS: There is no acute fracture or dislocation. No significant degenerative changes. No lytic or blastic osseous lesion. Soft tissues are unremarkable.     Impression: No acute osseous abnormality. Electronically signed: 07/10/2024 01:16 AM Cl Patterson MD      RADIOLOGY RESULTS: I have personally reviewed pertinent imaging studies.      Tonya Mcdonough DO  Gastroenterology Fellow  [facility]  Division of Gastroenterology & Hepatology  Available on Tiger[employer]    ** Please Note: This note is constructed using a voice recognition dictation system. **

## 2024-07-23 ENCOUNTER — OFFICE VISIT (OUTPATIENT)
Dept: FAMILY MEDICINE CLINIC | Facility: CLINIC | Age: 34
End: 2024-07-23
Payer: COMMERCIAL

## 2024-07-23 VITALS
HEART RATE: 87 BPM | OXYGEN SATURATION: 98 % | WEIGHT: 229.4 LBS | DIASTOLIC BLOOD PRESSURE: 56 MMHG | TEMPERATURE: 97.5 F | BODY MASS INDEX: 33.98 KG/M2 | HEIGHT: 69 IN | RESPIRATION RATE: 16 BRPM | SYSTOLIC BLOOD PRESSURE: 108 MMHG

## 2024-07-23 DIAGNOSIS — I10 HYPERTENSION, UNSPECIFIED TYPE: ICD-10-CM

## 2024-07-23 DIAGNOSIS — F41.9 ANXIETY: ICD-10-CM

## 2024-07-23 DIAGNOSIS — F32.A DEPRESSION, UNSPECIFIED DEPRESSION TYPE: ICD-10-CM

## 2024-07-23 DIAGNOSIS — M54.2 NECK PAIN: Primary | ICD-10-CM

## 2024-07-23 DIAGNOSIS — G47.00 INSOMNIA, UNSPECIFIED TYPE: ICD-10-CM

## 2024-07-23 PROCEDURE — 99214 OFFICE O/P EST MOD 30 MIN: CPT | Performed by: NURSE PRACTITIONER

## 2024-07-23 NOTE — ASSESSMENT & PLAN NOTE
- Well controlled with use of Trazodone as needed at bedtime. Continue same.   - Will continue to monitor.

## 2024-07-23 NOTE — PROGRESS NOTES
Ambulatory Visit  Name: Zina Hdz      : 1990      MRN: [id number]  Encounter Provider: MONTY Horowitz  Encounter Date: 2024   Encounter department: [facility]    Assessment & Plan   1. Neck pain  Assessment & Plan:  -X-ray of cervical spine showed findings consistent with muscle spasm.  Was otherwise unremarkable.  Advised to continue Robaxin as needed.  She was referred to physical therapy.  Orders:  -     Ambulatory Referral to Physical Therapy; Future  2. Hypertension, unspecified type  Assessment & Plan:  - Well controlled on lisinopril 10 mg daily. Continue same.   - Will continue to monitor.   3. Insomnia, unspecified type  Assessment & Plan:  - Well controlled with use of Trazodone as needed at bedtime. Continue same.   - Will continue to monitor.   4. Depression, unspecified depression type  Assessment & Plan:  - Stable on Celexa 20 mg daily. Continue same.   - Will continue to monitor.      5. Anxiety  Assessment & Plan:  - Stable on Celexa 20 mg daily. Continue same.   - Will continue to monitor.             History of Present Illness     Patient with PMH of HTN, SVT, RIVER, anxiety, and depression presents to office today for follow-up.  She is taking her prescribed medications and reports no side effects.  She has been having neck and right shoulder pain.  Had x-ray of right shoulder which was normal.  X-ray of cervical spine showed findings consistent with muscle spasm.  She is taking Robaxin as needed which helps.  She still has pain at the base of her neck which extends down her right arm.  Also has numbness of the right arm.  Also complains of pressure-like sensation in her head that comes and goes throughout the day.  Sometimes so strong she has to stop what she is doing.  Lasts for a minute and then goes away on its own.  She does have allergies and is taking her medication daily.  Denies any other significant concerns or complaints  today.            Review of Systems   Constitutional:  Negative for fatigue and fever.   HENT:  Negative for trouble swallowing.    Eyes:  Negative for visual disturbance.   Respiratory:  Negative for cough and shortness of breath.    Cardiovascular:  Negative for chest pain and palpitations.   Gastrointestinal:  Negative for abdominal pain and blood in stool.   Endocrine: Negative for cold intolerance and heat intolerance.   Genitourinary:  Negative for difficulty urinating and dysuria.   Musculoskeletal:  Positive for neck pain. Negative for gait problem.   Skin:  Negative for rash.   Neurological:  Positive for numbness and headaches. Negative for dizziness and syncope.   Hematological:  Negative for adenopathy.   Psychiatric/Behavioral:  Negative for behavioral problems.      Past Medical History:   Diagnosis Date   • Allergic    • Anxiety    • Depression    • Hypertension    • SVT (supraventricular tachycardia)      Past Surgical History:   Procedure Laterality Date   • CARDIAC ELECTROPHYSIOLOGY PROCEDURE N/A 1/30/2023    Procedure: Cardiac eps/svt ablation;  Surgeon: José Loving DO;  Location: [facility];  Service: Cardiology   • IR BIOPSY LIVER RANDOM  2/9/2023     Family History   Problem Relation Age of Onset   • Depression Mother    • Thyroid disease Mother    • Hypertension Mother    • Depression Father    • Depression Maternal Grandmother    • Thyroid disease Maternal Grandmother    • Hypertension Maternal Grandmother      Social History     Tobacco Use   • Smoking status: Never     Passive exposure: Never   • Smokeless tobacco: Never   Vaping Use   • Vaping status: Never Used   Substance and Sexual Activity   • Alcohol use: Yes     Alcohol/week: 2.0 standard drinks of alcohol     Types: 2 Glasses of wine per week     Comment: socially   • Drug use: Never   • Sexual activity: Not on file     Current Outpatient Medications on File Prior to Visit   Medication Sig   • Blood Glucose Monitoring  Suppl (OneTouch Verio Reflect) w/Device KIT Check blood sugars once daily. Please substitute with appropriate alternative as covered by patient's insurance. Dx: E11.65   • citalopram (CeleXA) 20 mg tablet Take 1 tablet (20 mg total) by mouth daily   • clotrimazole-betamethasone (LOTRISONE) 1-0.05 % cream Apply topically 2 (two) times a day   • glucose blood (OneTouch Verio) test strip Check blood sugars once daily. Please substitute with appropriate alternative as covered by patient's insurance. Dx: E11.65   • ketorolac (TORADOL) 10 mg tablet Take 1 tablet (10 mg total) by mouth every 6 (six) hours as needed for moderate pain   • lisinopril (ZESTRIL) 10 mg tablet Take 1 tablet (10 mg total) by mouth daily   • meclizine (ANTIVERT) 25 mg tablet Take 1 tablet (25 mg total) by mouth 3 (three) times a day as needed for dizziness   • meloxicam (MOBIC) 7.5 mg tablet Take 7.5 mg by mouth daily   • methocarbamol (ROBAXIN) 500 mg tablet Take 1 tablet (500 mg total) by mouth 3 (three) times a day as needed for muscle spasms   • montelukast (SINGULAIR) 10 mg tablet Take 1 tablet (10 mg total) by mouth daily at bedtime   • nystatin (MYCOSTATIN) powder Apply topically 3 (three) times a day   • OneTouch Delica Lancets 33G MISC Check blood sugars once daily. Please substitute with appropriate alternative as covered by patient's insurance. Dx: E11.65   • Semaglutide-Weight Management (WEGOVY) 0.5 MG/0.5ML Inject 0.5 mL (0.5 mg total) under the skin once a week for 28 days   • traZODone (DESYREL) 50 mg tablet Take 1 tablet (50 mg total) by mouth daily at bedtime as needed for sleep   • benzonatate (TESSALON) 200 MG capsule Take 1 capsule (200 mg total) by mouth 3 (three) times a day as needed for cough (Patient not taking: Reported on 7/23/2024)   • famotidine (PEPCID) 20 mg tablet Take 1 tablet (20 mg total) by mouth 2 (two) times a day for 12 days   • ondansetron (ZOFRAN) 4 mg tablet Take 1 tablet (4 mg total) by mouth every 8  "(eight) hours as needed for nausea or vomiting for up to 10 days   • [DISCONTINUED] prochlorperazine (COMPAZINE) 10 mg tablet Take 1 tablet (10 mg total) by mouth every 6 (six) hours as needed for nausea or vomiting     No Known Allergies  Immunization History   Administered Date(s) Administered   • COVID-19 PFIZER VACCINE 0.3 ML IM 06/01/2021, 06/22/2021   • COVID-19 Pfizer vac (Surya-sucrose, gray cap) 12 yr+ IM 01/14/2022   • DTaP 5 1992, 1992, 1992, 1992, 1992   • HPV Quadrivalent 04/21/2008, 07/02/2008   • HPV9 02/21/2020   • Hep B, adult 1992, 1992, 06/22/1993   • Hib (PRP-OMP) 1992, 1992, 1992, 09/09/1993   • INFLUENZA 10/04/2016, 10/03/2017, 10/25/2021   • IPV 1992, 1992, 09/09/1993, 01/20/1998   • Influenza Quadrivalent Preservative Free 3 years and older IM 10/22/2018, 10/23/2019, 10/23/2020   • MMR 06/22/1993, 01/20/1998   • Meningococcal MCV4P 04/21/2008   • Meningococcal, Unknown Serogroups 04/21/2008   • Td (adult), adsorbed 1992   • Tdap 02/27/2017   • Varicella Zoster Immune Globulin 01/07/2000     Objective     /56 (BP Location: Left arm, Patient Position: Sitting, Cuff Size: Large)   Pulse 87   Temp 97.5 °F (36.4 °C) (Tympanic)   Resp 16   Ht 5' 9\" (1.753 m)   Wt 104 kg (229 lb 6.4 oz)   SpO2 98%   BMI 33.88 kg/m²     Physical Exam  Vitals and nursing note reviewed.   Constitutional:       General: She is not in acute distress.     Appearance: Normal appearance. She is well-developed. She is not ill-appearing.   HENT:      Head: Normocephalic and atraumatic.      Right Ear: External ear normal.      Left Ear: External ear normal.   Eyes:      Conjunctiva/sclera: Conjunctivae normal.   Cardiovascular:      Rate and Rhythm: Normal rate and regular rhythm.      Heart sounds: Normal heart sounds.   Pulmonary:      Effort: Pulmonary effort is normal.      Breath sounds: Normal breath sounds.   Musculoskeletal:    "      General: Normal range of motion.      Cervical back: Normal range of motion.   Skin:     General: Skin is warm and dry.   Neurological:      Mental Status: She is alert and oriented to person, place, and time.   Psychiatric:         Mood and Affect: Mood normal.         Behavior: Behavior normal.

## 2024-07-23 NOTE — ASSESSMENT & PLAN NOTE
-X-ray of cervical spine showed findings consistent with muscle spasm.  Was otherwise unremarkable.  Advised to continue Robaxin as needed.  She was referred to physical therapy.

## 2024-07-30 ENCOUNTER — HOSPITAL ENCOUNTER (OUTPATIENT)
Dept: VASCULAR ULTRASOUND | Facility: HOSPITAL | Age: 34
Discharge: HOME | End: 2024-07-30
Payer: COMMERCIAL

## 2024-07-30 DIAGNOSIS — E66.9 OBESITY, CLASS I, BMI 30-34.9: Primary | ICD-10-CM

## 2024-07-30 DIAGNOSIS — M79.604 RIGHT LEG PAIN: ICD-10-CM

## 2024-07-30 PROCEDURE — 93971 EXTREMITY STUDY: CPT | Performed by: SURGERY

## 2024-07-30 PROCEDURE — 93971 EXTREMITY STUDY: CPT

## 2024-08-01 ENCOUNTER — HOSPITAL ENCOUNTER (OUTPATIENT)
Dept: NEUROLOGY | Facility: CLINIC | Age: 34
End: 2024-08-01
Payer: COMMERCIAL

## 2024-08-01 DIAGNOSIS — M25.50 PAIN IN JOINT, MULTIPLE SITES: ICD-10-CM

## 2024-08-01 PROCEDURE — 95911 NRV CNDJ TEST 9-10 STUDIES: CPT | Performed by: PSYCHIATRY & NEUROLOGY

## 2024-08-01 PROCEDURE — 95886 MUSC TEST DONE W/N TEST COMP: CPT | Performed by: PSYCHIATRY & NEUROLOGY

## 2024-08-05 ENCOUNTER — TELEPHONE (OUTPATIENT)
Dept: BARIATRICS | Facility: CLINIC | Age: 34
End: 2024-08-05

## 2024-08-05 ENCOUNTER — TELEPHONE (OUTPATIENT)
Age: 34
End: 2024-08-05

## 2024-08-05 NOTE — TELEPHONE ENCOUNTER
Patient calling in and aksing if appt for 8/8 can be made virtual with Kala  she had to reschedule her appt for 8/6 which was already made a virtual appt       Patient asking for a call back with confirmation that it was changed to virtual

## 2024-08-08 ENCOUNTER — TELEMEDICINE (OUTPATIENT)
Age: 34
End: 2024-08-08

## 2024-08-08 VITALS — BODY MASS INDEX: 32.58 KG/M2 | HEIGHT: 69 IN | WEIGHT: 220 LBS

## 2024-08-08 DIAGNOSIS — E66.9 OBESITY, CLASS I, BMI 30-34.9: Primary | ICD-10-CM

## 2024-08-08 DIAGNOSIS — R10.9 ABDOMINAL PAIN: ICD-10-CM

## 2024-08-08 RX ORDER — ONDANSETRON 4 MG/1
4 TABLET, FILM COATED ORAL EVERY 8 HOURS PRN
Qty: 20 TABLET | Refills: 0 | Status: SHIPPED | OUTPATIENT
Start: 2024-08-08 | End: 2024-08-18

## 2024-08-08 NOTE — PROGRESS NOTES
Virtual Brief Visit  Name: Zina Hdz      : 1990      MRN: [id number]  Encounter Provider: Yue Gonzalez PA-C  Encounter Date: 2024   Encounter department: [facility] [place]    This Visit is being completed by telephone. The Patient is located at Home and in the following state in which I hold an active license [place]    The patient was identified by name and date of birth. Zina Hdz was informed that this is a telemedicine visit and that the visit is being conducted through Telephone.  My office door was closed. No one else was in the room.  She acknowledged consent and understanding of privacy and security of the video platform. The patient has agreed to participate and understands they can discontinue the visit at any time.    Patient is aware this is a billable service.     Assessment & Plan   1. Obesity, Class I, BMI 30-34.9  -     Semaglutide-Weight Management (WEGOVY) 1.7 MG/0.75ML; Inject 0.75 mL (1.7 mg total) under the skin once a week  2. Abdominal pain  -     ondansetron (ZOFRAN) 4 mg tablet; Take 1 tablet (4 mg total) by mouth every 8 (eight) hours as needed for nausea or vomiting for up to 10 days    Calorie tracking/deficit  Physical activity goals reviewed  Bowel regimen discussed  AOM - continue wegovy dose escalation until 1.7mg and then maintain  RTC: 3 month nurse visit, 6 month follow up    History of Present Illness    Zina Hdz  is a 34 y.o. female with history of SVT, HTN, RIVER, depression, insomnia, and excess weight, here to pursue weight loss management.  Previous notes and records have been reviewed.     Prediabetes - hemoglobin A1c 5.7 (3/27/24)  TSH WNL     HTN - lisinopril  RIVER - follows with hepatology. Chronically elevated LFTs  Depression -   celexa     Previously took wegovy. In 2023 she had been late a few weeks and restarted a higher dose. Those ultimately led to a hospitalization for uncontrolled nausea and vomiting.  Lipase was WNL and no radiographic findings of pancreatitis on CT.  Other than that episode, she felt she tolerated the medicine well and was having good weight loss results. She is interested in restarting.      Decreased physical activity with left knee arthralgia. + RA     Dietary - trying to minimize starches. Portion control      Currently not using contraception - had IUD removed months ago. Has not had return of menstrual cycle.   Patient will commit to condoms and discuss with OBGYN further options.     Retarted on wegovy - currently in the middle of wegovy 0.5mg  Has lost 10 lbs  Noticing constipation. Taking stool softener + daily laxative  Mild nausea which is relieved with zofran    Visit Time  Total Visit Duration: 12 minutes

## 2024-08-26 DIAGNOSIS — E66.9 OBESITY, CLASS I, BMI 30-34.9: ICD-10-CM

## 2024-08-26 DIAGNOSIS — J30.2 SEASONAL ALLERGIC RHINITIS, UNSPECIFIED TRIGGER: ICD-10-CM

## 2024-08-27 ENCOUNTER — TELEPHONE (OUTPATIENT)
Age: 34
End: 2024-08-27

## 2024-08-28 RX ORDER — MONTELUKAST SODIUM 10 MG/1
10 TABLET ORAL
Qty: 90 TABLET | Refills: 0 | Status: SHIPPED | OUTPATIENT
Start: 2024-08-28 | End: 2024-11-26

## 2024-09-03 DIAGNOSIS — E66.9 OBESITY, CLASS I, BMI 30-34.9: ICD-10-CM

## 2024-09-17 ENCOUNTER — TELEPHONE (OUTPATIENT)
Age: 34
End: 2024-09-17

## 2024-09-17 NOTE — TELEPHONE ENCOUNTER
Reached out to patient in regards to Therapy wait list and possible scheduling.     Left VM for pt to contact intake department at [phone] option 3.

## 2024-10-02 ENCOUNTER — APPOINTMENT (OUTPATIENT)
Dept: RADIOLOGY | Age: 34
End: 2024-10-02
Payer: COMMERCIAL

## 2024-10-02 ENCOUNTER — OFFICE VISIT (OUTPATIENT)
Dept: FAMILY MEDICINE CLINIC | Facility: CLINIC | Age: 34
End: 2024-10-02
Payer: COMMERCIAL

## 2024-10-02 ENCOUNTER — NURSE TRIAGE (OUTPATIENT)
Age: 34
End: 2024-10-02

## 2024-10-02 VITALS
HEIGHT: 69 IN | RESPIRATION RATE: 16 BRPM | BODY MASS INDEX: 30.48 KG/M2 | OXYGEN SATURATION: 98 % | DIASTOLIC BLOOD PRESSURE: 76 MMHG | HEART RATE: 70 BPM | WEIGHT: 205.8 LBS | TEMPERATURE: 98 F | SYSTOLIC BLOOD PRESSURE: 112 MMHG

## 2024-10-02 DIAGNOSIS — K59.00 CONSTIPATION, UNSPECIFIED CONSTIPATION TYPE: Primary | ICD-10-CM

## 2024-10-02 DIAGNOSIS — K59.00 CONSTIPATION, UNSPECIFIED CONSTIPATION TYPE: ICD-10-CM

## 2024-10-02 DIAGNOSIS — I10 HYPERTENSION, UNSPECIFIED TYPE: ICD-10-CM

## 2024-10-02 DIAGNOSIS — R11.2 NAUSEA AND VOMITING, UNSPECIFIED VOMITING TYPE: ICD-10-CM

## 2024-10-02 DIAGNOSIS — R10.13 EPIGASTRIC PAIN: ICD-10-CM

## 2024-10-02 PROCEDURE — 99214 OFFICE O/P EST MOD 30 MIN: CPT | Performed by: NURSE PRACTITIONER

## 2024-10-02 PROCEDURE — 74022 RADEX COMPL AQT ABD SERIES: CPT

## 2024-10-02 RX ORDER — METOCLOPRAMIDE 5 MG/1
5 TABLET ORAL 3 TIMES DAILY PRN
Qty: 30 TABLET | Refills: 0 | Status: SHIPPED | OUTPATIENT
Start: 2024-10-02

## 2024-10-02 RX ORDER — PANTOPRAZOLE SODIUM 20 MG/1
20 TABLET, DELAYED RELEASE ORAL
Qty: 90 TABLET | Refills: 0 | Status: SHIPPED | OUTPATIENT
Start: 2024-10-02 | End: 2024-12-31

## 2024-10-02 RX ORDER — TIZANIDINE 2 MG/1
TABLET ORAL
COMMUNITY
Start: 2024-07-23

## 2024-10-02 NOTE — TELEPHONE ENCOUNTER
"Constipation (5-6 days) and N/V. Feels like she also has pressure and fullness in her upper abdomen. Patient has a hx of constipation. Patient also has hx of bowel obstruction. Patient was scheduled for 10:40 am with Blanca.     Reason for Disposition   Patient wants to be seen    Answer Assessment - Initial Assessment Questions  1. NAUSEA SEVERITY: \"How bad is the nausea?\" (e.g., mild, moderate, severe; dehydration, weight loss)    - MILD: loss of appetite without change in eating habits    - MODERATE: decreased oral intake without significant weight loss, dehydration, or malnutrition    - SEVERE: inadequate caloric or fluid intake, significant weight loss, symptoms of dehydration      Moderate to severe  2. ONSET: \"When did the nausea begin?\"      Day 4  3. VOMITING: \"Any vomiting?\" If Yes, ask: \"How many times today?\"      yes  4. RECURRENT SYMPTOM: \"Have you had nausea before?\" If Yes, ask: \"When was the last time?\" \"What happened that time?\"      yes  5. CAUSE: \"What do you think is causing the nausea?\"      unsure  6. PREGNANCY: \"Is there any chance you are pregnant?\" (e.g., unprotected intercourse, missed birth control pill, broken condom)      N/A    Protocols used: Nausea-ADULT-[place]    "

## 2024-10-02 NOTE — PROGRESS NOTES
Ambulatory Visit  Name: Zina Hdz      : 1990      MRN: [id number]  Encounter Provider: MONTY Horowitz  Encounter Date: 10/2/2024   Encounter department: [facility]    Assessment & Plan  Constipation, unspecified constipation type  - Will obtain obstruction series.   - Recommend Colace, Miralax, increased oral hydration, and high fiber dite.   - Referred to GI regarding chronic nature of her constipation.   Orders:    XR abdomen obstruction series; Future    Ambulatory Referral to Gastroenterology; Future    Nausea and vomiting, unspecified vomiting type  - Not well controlled.  - Prescription sent for Reglan as needed.   - Will also start pantoprazole.   - Will obtain obstruction series.   - Recommend BRAT diet and increased oral hydration.   Orders:    metoclopramide (REGLAN) 5 mg tablet; Take 1 tablet (5 mg total) by mouth 3 (three) times a day as needed (nasuea)    XR abdomen obstruction series; Future    Epigastric pain  - Not well controlled.  - Prescription sent for pantoprazole 20 mg daily before breakfast.   - Follow up with GI.   Orders:    pantoprazole (PROTONIX) 20 mg tablet; Take 1 tablet (20 mg total) by mouth daily before breakfast    Hypertension, unspecified type  - Well controlled on lisinopril 10 mg daily. Continue same.   - Will continue to monitor.               History of Present Illness     Patient presents to office today with complaints of nausea, vomiting, and epigastric pain since Saturday. States that she feels like something is stuck in her esophagus. Gets pressure in her chest and then gets nauseous. She has decreased appetite and when she does try to eat or drink she brings it back up. She does have a history of a bowel obstruction. Also with history of chronic constipation. Her LBM was on Friday. States she is not passing gas. Has been taking Zofran as needed with no improvement. Denies any fever. Denies any other concerns or complaints  today.       Review of Systems   Constitutional:  Positive for appetite change. Negative for fatigue and fever.   HENT:  Negative for trouble swallowing.    Eyes:  Negative for visual disturbance.   Respiratory:  Negative for cough and shortness of breath.    Cardiovascular:  Negative for chest pain and palpitations.   Gastrointestinal:  Positive for abdominal pain, constipation, nausea and vomiting. Negative for blood in stool.   Endocrine: Negative for cold intolerance and heat intolerance.   Genitourinary:  Negative for difficulty urinating and dysuria.   Musculoskeletal:  Negative for gait problem.   Skin:  Negative for rash.   Neurological:  Positive for weakness. Negative for dizziness, syncope and headaches.   Hematological:  Negative for adenopathy.   Psychiatric/Behavioral:  Negative for behavioral problems.      Past Medical History:   Diagnosis Date    Allergic     Anxiety     Depression     Hypertension     SVT (supraventricular tachycardia) (HCC)      Past Surgical History:   Procedure Laterality Date    CARDIAC ELECTROPHYSIOLOGY PROCEDURE N/A 1/30/2023    Procedure: Cardiac eps/svt ablation;  Surgeon: José Loving DO;  Location: [facility];  Service: Cardiology    IR BIOPSY LIVER RANDOM  2/9/2023     Family History   Problem Relation Age of Onset    Depression Mother     Thyroid disease Mother     Hypertension Mother     Depression Father     Depression Maternal Grandmother     Thyroid disease Maternal Grandmother     Hypertension Maternal Grandmother      Social History     Tobacco Use    Smoking status: Never     Passive exposure: Never    Smokeless tobacco: Never   Vaping Use    Vaping status: Never Used   Substance and Sexual Activity    Alcohol use: Yes     Alcohol/week: 2.0 standard drinks of alcohol     Types: 2 Glasses of wine per week     Comment: socially    Drug use: Never    Sexual activity: Not on file     Current Outpatient Medications on File Prior to Visit   Medication Sig     Blood Glucose Monitoring Suppl (OneTouch Verio Reflect) w/Device KIT Check blood sugars once daily. Please substitute with appropriate alternative as covered by patient's insurance. Dx: E11.65    citalopram (CeleXA) 20 mg tablet Take 1 tablet (20 mg total) by mouth daily    clotrimazole-betamethasone (LOTRISONE) 1-0.05 % cream Apply topically 2 (two) times a day    glucose blood (OneTouch Verio) test strip Check blood sugars once daily. Please substitute with appropriate alternative as covered by patient's insurance. Dx: E11.65    ketorolac (TORADOL) 10 mg tablet Take 1 tablet (10 mg total) by mouth every 6 (six) hours as needed for moderate pain    lisinopril (ZESTRIL) 10 mg tablet Take 1 tablet (10 mg total) by mouth daily    meclizine (ANTIVERT) 25 mg tablet Take 1 tablet (25 mg total) by mouth 3 (three) times a day as needed for dizziness    meloxicam (MOBIC) 7.5 mg tablet Take 7.5 mg by mouth daily    methocarbamol (ROBAXIN) 500 mg tablet Take 1 tablet (500 mg total) by mouth 3 (three) times a day as needed for muscle spasms    montelukast (SINGULAIR) 10 mg tablet Take 1 tablet (10 mg total) by mouth daily at bedtime    nystatin (MYCOSTATIN) powder Apply topically 3 (three) times a day    OneTouch Delica Lancets 33G MISC Check blood sugars once daily. Please substitute with appropriate alternative as covered by patient's insurance. Dx: E11.65    Semaglutide-Weight Management (WEGOVY) 1.7 MG/0.75ML Inject 0.75 mL (1.7 mg total) under the skin once a week    tiZANidine (ZANAFLEX) 2 mg tablet     traZODone (DESYREL) 50 mg tablet Take 1 tablet (50 mg total) by mouth daily at bedtime as needed for sleep    benzonatate (TESSALON) 200 MG capsule Take 1 capsule (200 mg total) by mouth 3 (three) times a day as needed for cough (Patient not taking: Reported on 7/23/2024)    famotidine (PEPCID) 20 mg tablet Take 1 tablet (20 mg total) by mouth 2 (two) times a day for 12 days    ondansetron (ZOFRAN) 4 mg tablet Take 1  "tablet (4 mg total) by mouth every 8 (eight) hours as needed for nausea or vomiting for up to 10 days     No Known Allergies  Immunization History   Administered Date(s) Administered    COVID-19 PFIZER VACCINE 0.3 ML IM 06/01/2021, 06/22/2021    COVID-19 Pfizer vac (Surya-sucrose, gray cap) 12 yr+ IM 01/14/2022    DTaP 5 1992, 1992, 1992, 1992, 1992    HPV Quadrivalent 04/21/2008, 07/02/2008    HPV9 02/21/2020    Hep B, adult 1992, 1992, 06/22/1993    Hib (PRP-OMP) 1992, 1992, 1992, 09/09/1993    INFLUENZA 10/04/2016, 10/03/2017, 10/25/2021    IPV 1992, 1992, 09/09/1993, 01/20/1998    Influenza Quadrivalent Preservative Free 3 years and older IM 10/22/2018, 10/23/2019, 10/23/2020    MMR 06/22/1993, 01/20/1998    Meningococcal MCV4P 04/21/2008    Meningococcal, Unknown Serogroups 04/21/2008    Td (adult), adsorbed 1992    Tdap 02/27/2017    Varicella Zoster Immune Globulin 01/07/2000     Objective     /76 (BP Location: Left arm, Patient Position: Sitting, Cuff Size: Large)   Pulse 70   Temp 98 °F (36.7 °C) (Tympanic)   Resp 16   Ht 5' 9\" (1.753 m)   Wt 93.4 kg (205 lb 12.8 oz)   SpO2 98%   BMI 30.39 kg/m²     Physical Exam  Vitals and nursing note reviewed.   Constitutional:       General: She is not in acute distress.     Appearance: Normal appearance. She is well-developed. She is ill-appearing.   HENT:      Head: Normocephalic and atraumatic.      Right Ear: External ear normal.      Left Ear: External ear normal.   Eyes:      Conjunctiva/sclera: Conjunctivae normal.   Cardiovascular:      Rate and Rhythm: Normal rate and regular rhythm.      Heart sounds: Normal heart sounds.   Pulmonary:      Effort: Pulmonary effort is normal.      Breath sounds: Normal breath sounds.   Abdominal:      General: Bowel sounds are normal.      Palpations: Abdomen is soft.      Tenderness: There is abdominal tenderness in the right upper " quadrant, epigastric area and left upper quadrant.   Musculoskeletal:         General: Normal range of motion.      Cervical back: Normal range of motion.   Skin:     General: Skin is warm and dry.   Neurological:      Mental Status: She is alert and oriented to person, place, and time.   Psychiatric:         Mood and Affect: Mood normal.         Behavior: Behavior normal.

## 2024-10-02 NOTE — LETTER
October 2, 2024     Patient: Zina Hdz  YOB: 1990  Date of Visit: 10/2/2024      To Whom it May Concern:    Zina Hdz is under my professional care. Zina was seen in my office on 10/2/2024. Please excuse her from work 9/30/2024 and 10/2/2024.  Zina may return to work on 10/3/2024 .    If you have any questions or concerns, please don't hesitate to call.         Sincerely,          MONTY Horowitz        CC: No Recipients

## 2024-10-02 NOTE — ASSESSMENT & PLAN NOTE
- Not well controlled.  - Prescription sent for pantoprazole 20 mg daily before breakfast.   - Follow up with GI.   Orders:    pantoprazole (PROTONIX) 20 mg tablet; Take 1 tablet (20 mg total) by mouth daily before breakfast

## 2024-10-04 ENCOUNTER — PATIENT MESSAGE (OUTPATIENT)
Dept: FAMILY MEDICINE CLINIC | Facility: CLINIC | Age: 34
End: 2024-10-04

## 2024-10-04 DIAGNOSIS — D22.9 BENIGN MOLE: Primary | ICD-10-CM

## 2024-10-11 ENCOUNTER — PATIENT MESSAGE (OUTPATIENT)
Dept: FAMILY MEDICINE CLINIC | Facility: CLINIC | Age: 34
End: 2024-10-11

## 2024-10-28 ENCOUNTER — HOSPITAL ENCOUNTER (EMERGENCY)
Facility: HOSPITAL | Age: 34
Discharge: HOME | End: 2024-10-28
Attending: EMERGENCY MEDICINE
Payer: COMMERCIAL

## 2024-10-28 VITALS
OXYGEN SATURATION: 97 % | RESPIRATION RATE: 18 BRPM | SYSTOLIC BLOOD PRESSURE: 149 MMHG | TEMPERATURE: 98 F | HEART RATE: 97 BPM | DIASTOLIC BLOOD PRESSURE: 75 MMHG

## 2024-10-28 DIAGNOSIS — R11.2 NAUSEA AND VOMITING: ICD-10-CM

## 2024-10-28 DIAGNOSIS — R10.9 ABDOMINAL PAIN: Primary | ICD-10-CM

## 2024-10-28 LAB
ALBUMIN SERPL BCG-MCNC: 4.4 G/DL (ref 3.5–5)
ALP SERPL-CCNC: 84 U/L (ref 34–104)
ALT SERPL W P-5'-P-CCNC: 40 U/L (ref 7–52)
ANION GAP SERPL CALCULATED.3IONS-SCNC: 13 MMOL/L (ref 4–13)
AST SERPL W P-5'-P-CCNC: 30 U/L (ref 13–39)
ATRIAL RATE: 75 BPM
BASOPHILS # BLD AUTO: 0.04 THOUSANDS/ΜL (ref 0–0.1)
BASOPHILS NFR BLD AUTO: 0 % (ref 0–1)
BILIRUB SERPL-MCNC: 0.72 MG/DL (ref 0.2–1)
BUN SERPL-MCNC: 9 MG/DL (ref 5–25)
CALCIUM SERPL-MCNC: 9.1 MG/DL (ref 8.4–10.2)
CHLORIDE SERPL-SCNC: 107 MMOL/L (ref 96–108)
CO2 SERPL-SCNC: 21 MMOL/L (ref 21–32)
CREAT SERPL-MCNC: 0.62 MG/DL (ref 0.6–1.3)
EOSINOPHIL # BLD AUTO: 0.12 THOUSAND/ΜL (ref 0–0.61)
EOSINOPHIL NFR BLD AUTO: 1 % (ref 0–6)
ERYTHROCYTE [DISTWIDTH] IN BLOOD BY AUTOMATED COUNT: 12.7 % (ref 11.6–15.1)
GFR SERPL CREATININE-BSD FRML MDRD: 119 ML/MIN/1.73SQ M
GLUCOSE SERPL-MCNC: 85 MG/DL (ref 65–140)
HCT VFR BLD AUTO: 46 % (ref 34.8–46.1)
HGB BLD-MCNC: 15.2 G/DL (ref 11.5–15.4)
IMM GRANULOCYTES # BLD AUTO: 0.07 THOUSAND/UL (ref 0–0.2)
IMM GRANULOCYTES NFR BLD AUTO: 1 % (ref 0–2)
LIPASE SERPL-CCNC: 39 U/L (ref 11–82)
LYMPHOCYTES # BLD AUTO: 1.58 THOUSANDS/ΜL (ref 0.6–4.47)
LYMPHOCYTES NFR BLD AUTO: 13 % (ref 14–44)
MCH RBC QN AUTO: 29 PG (ref 26.8–34.3)
MCHC RBC AUTO-ENTMCNC: 33 G/DL (ref 31.4–37.4)
MCV RBC AUTO: 88 FL (ref 82–98)
MONOCYTES # BLD AUTO: 0.43 THOUSAND/ΜL (ref 0.17–1.22)
MONOCYTES NFR BLD AUTO: 4 % (ref 4–12)
NEUTROPHILS # BLD AUTO: 9.74 THOUSANDS/ΜL (ref 1.85–7.62)
NEUTS SEG NFR BLD AUTO: 81 % (ref 43–75)
NRBC BLD AUTO-RTO: 0 /100 WBCS
P AXIS: 65 DEGREES
PLATELET # BLD AUTO: 256 THOUSANDS/UL (ref 149–390)
PMV BLD AUTO: 10.1 FL (ref 8.9–12.7)
POTASSIUM SERPL-SCNC: 4 MMOL/L (ref 3.5–5.3)
PR INTERVAL: 168 MS
PROT SERPL-MCNC: 7.6 G/DL (ref 6.4–8.4)
QRS AXIS: 83 DEGREES
QRSD INTERVAL: 104 MS
QT INTERVAL: 380 MS
QTC INTERVAL: 424 MS
RBC # BLD AUTO: 5.25 MILLION/UL (ref 3.81–5.12)
SODIUM SERPL-SCNC: 141 MMOL/L (ref 135–147)
T WAVE AXIS: 41 DEGREES
VENTRICULAR RATE: 75 BPM
WBC # BLD AUTO: 11.98 THOUSAND/UL (ref 4.31–10.16)

## 2024-10-28 PROCEDURE — 99285 EMERGENCY DEPT VISIT HI MDM: CPT | Performed by: EMERGENCY MEDICINE

## 2024-10-28 PROCEDURE — 85025 COMPLETE CBC W/AUTO DIFF WBC: CPT

## 2024-10-28 PROCEDURE — 99284 EMERGENCY DEPT VISIT MOD MDM: CPT

## 2024-10-28 PROCEDURE — 93010 ELECTROCARDIOGRAM REPORT: CPT | Performed by: INTERNAL MEDICINE

## 2024-10-28 PROCEDURE — 36415 COLL VENOUS BLD VENIPUNCTURE: CPT

## 2024-10-28 PROCEDURE — 80053 COMPREHEN METABOLIC PANEL: CPT

## 2024-10-28 PROCEDURE — 83690 ASSAY OF LIPASE: CPT

## 2024-10-28 PROCEDURE — 96375 TX/PRO/DX INJ NEW DRUG ADDON: CPT

## 2024-10-28 PROCEDURE — 93005 ELECTROCARDIOGRAM TRACING: CPT

## 2024-10-28 PROCEDURE — 96365 THER/PROPH/DIAG IV INF INIT: CPT

## 2024-10-28 RX ORDER — SUCRALFATE 1 G/1
1 TABLET ORAL ONCE
Status: COMPLETED | OUTPATIENT
Start: 2024-10-28 | End: 2024-10-28

## 2024-10-28 RX ORDER — KETOROLAC TROMETHAMINE 30 MG/ML
15 INJECTION, SOLUTION INTRAMUSCULAR; INTRAVENOUS ONCE
Status: COMPLETED | OUTPATIENT
Start: 2024-10-28 | End: 2024-10-28

## 2024-10-28 RX ORDER — ONDANSETRON 2 MG/ML
4 INJECTION INTRAMUSCULAR; INTRAVENOUS ONCE
Status: COMPLETED | OUTPATIENT
Start: 2024-10-28 | End: 2024-10-28

## 2024-10-28 RX ADMIN — SUCRALFATE 1 G: 1 TABLET ORAL at 14:50

## 2024-10-28 RX ADMIN — SODIUM CHLORIDE, SODIUM LACTATE, POTASSIUM CHLORIDE, AND CALCIUM CHLORIDE 1000 ML: .6; .31; .03; .02 INJECTION, SOLUTION INTRAVENOUS at 12:19

## 2024-10-28 RX ADMIN — KETOROLAC TROMETHAMINE 15 MG: 30 INJECTION, SOLUTION INTRAMUSCULAR at 12:17

## 2024-10-28 RX ADMIN — ONDANSETRON 4 MG: 2 INJECTION INTRAMUSCULAR; INTRAVENOUS at 12:17

## 2024-10-28 NOTE — ED ATTENDING ATTESTATION
10/28/2024  I, Brittney Goel MD, saw and evaluated the patient. I have discussed the patient with the resident/non-physician practitioner and agree with the resident's/non-physician practitioner's findings, Plan of Care, and MDM as documented in the resident's/non-physician practitioner's note, except where noted. All available labs and Radiology studies were reviewed.  I was present for key portions of any procedure(s) performed by the resident/non-physician practitioner and I was immediately available to provide assistance.       At this point I agree with the current assessment done in the Emergency Department.  I have conducted an independent evaluation of this patient a history and physical is as follows:  This is a 34-year-old female with a history of anxiety and depression, obesity, on Wegovy, presenting with a chief complaint of abdominal pain and vomiting.  Patient states that she has been having severe epigastric pain, cannot get comfortable, radiates to her back.  Has been having severe nausea and chills as well.  Patient states that she always gets the symptoms after she gets her medication, but it is worse today.  No fevers or chills.  Did have some blood in her stool and in her vomitus which was scant.  On exam the patient is upset.  Her vital signs are remarkable for slight tachycardia.  On HEENT exam, she has no significant pallor.  Her mucous membranes are moist.  Her neck is supple with no subcutaneous air.  Her heart is regular tachycardic without murmurs, rubs, or gallops.  Lungs are clear with good air movement.  Her abdomen is soft with mild epigastric tenderness without rebound or guarding.  Her extremities are intact.  She is neurologically nonfocal with normal skin and back exam. MEDICAL DECISION MAKING    Number and Complexity of Problems  Differential diagnosis: Epigastric pain, may be side effect of medication versus gastritis, peptic ulcer disease, pancreatitis, doubt cardiac,  doubt intra thoracic process    Medical Decision Making Data  External documents reviewed:   My EKG interpretation: sinus  narrow complex nl axis no ischemia or ectopy  My CT interpretation:   My X-ray interpretation:   My ultrasound interpretation:     No orders to display       Labs Reviewed   CBC AND DIFFERENTIAL - Abnormal       Result Value Ref Range Status    WBC 11.98 (*) 4.31 - 10.16 Thousand/uL Final    RBC 5.25 (*) 3.81 - 5.12 Million/uL Final    Hemoglobin 15.2  11.5 - 15.4 g/dL Final    Hematocrit 46.0  34.8 - 46.1 % Final    MCV 88  82 - 98 fL Final    MCH 29.0  26.8 - 34.3 pg Final    MCHC 33.0  31.4 - 37.4 g/dL Final    RDW 12.7  11.6 - 15.1 % Final    MPV 10.1  8.9 - 12.7 fL Final    Platelets 256  149 - 390 Thousands/uL Final    nRBC 0  /100 WBCs Final    Segmented % 81 (*) 43 - 75 % Final    Immature Grans % 1  0 - 2 % Final    Lymphocytes % 13 (*) 14 - 44 % Final    Monocytes % 4  4 - 12 % Final    Eosinophils Relative 1  0 - 6 % Final    Basophils Relative 0  0 - 1 % Final    Absolute Neutrophils 9.74 (*) 1.85 - 7.62 Thousands/µL Final    Absolute Immature Grans 0.07  0.00 - 0.20 Thousand/uL Final    Absolute Lymphocytes 1.58  0.60 - 4.47 Thousands/µL Final    Absolute Monocytes 0.43  0.17 - 1.22 Thousand/µL Final    Eosinophils Absolute 0.12  0.00 - 0.61 Thousand/µL Final    Basophils Absolute 0.04  0.00 - 0.10 Thousands/µL Final   LIPASE - Normal    Lipase 39  11 - 82 u/L Final   COMPREHENSIVE METABOLIC PANEL    Sodium 141  135 - 147 mmol/L Final    Potassium 4.0  3.5 - 5.3 mmol/L Final    Chloride 107  96 - 108 mmol/L Final    CO2 21  21 - 32 mmol/L Final    ANION GAP 13  4 - 13 mmol/L Final    BUN 9  5 - 25 mg/dL Final    Creatinine 0.62  0.60 - 1.30 mg/dL Final    Comment: Standardized to IDMS reference method    Glucose 85  65 - 140 mg/dL Final    Comment: If the patient is fasting, the ADA then defines impaired fasting glucose as > 100 mg/dL and diabetes as > or equal to 123 mg/dL.     Calcium 9.1  8.4 - 10.2 mg/dL Final    AST 30  13 - 39 U/L Final    ALT 40  7 - 52 U/L Final    Comment: Specimen collection should occur prior to Sulfasalazine administration due to the potential for falsely depressed results.     Alkaline Phosphatase 84  34 - 104 U/L Final    Total Protein 7.6  6.4 - 8.4 g/dL Final    Albumin 4.4  3.5 - 5.0 g/dL Final    Total Bilirubin 0.72  0.20 - 1.00 mg/dL Final    Comment: Use of this assay is not recommended for patients undergoing treatment with eltrombopag due to the potential for falsely elevated results.  N-acetyl-p-benzoquinone imine (metabolite of Acetaminophen) will generate erroneously low results in samples for patients that have taken an overdose of Acetaminophen.    eGFR 119  ml/min/1.73sq m Final    Narrative:     [employer] guidelines for Chronic Kidney Disease (CKD):     Stage 1 with normal or high GFR (GFR > 90 mL/min/1.73 square meters)    Stage 2 Mild CKD (GFR = 60-89 mL/min/1.73 square meters)    Stage 3A Moderate CKD (GFR = 45-59 mL/min/1.73 square meters)    Stage 3B Moderate CKD (GFR = 30-44 mL/min/1.73 square meters)    Stage 4 Severe CKD (GFR = 15-29 mL/min/1.73 square meters)    Stage 5 End Stage CKD (GFR <15 mL/min/1.73 square meters)  Note: GFR calculation is accurate only with a steady state creatinine       Labs reviewed by me are significant for:     Clinical decision rules/scores are significant for:     Discussed case with:   Considered admission for:     Treatment and Disposition  ED course: Patient seen and examined, managed symptoms with Zofran, Toradol, will check labs  Shared decision making:   Code status:     ED Course         Critical Care Time  Procedures

## 2024-10-28 NOTE — Clinical Note
Zina Hdz was seen and treated in our emergency department on 10/28/2024.    No restrictions            Diagnosis:     Zina  is off the rest of the shift today, may return to work on return date.    She may return on this date: 10/29/2024         If you have any questions or concerns, please don't hesitate to call.      Yvon Cheek, DO    ______________________________           _______________          _______________  Hospital Representative                              Date                                Time

## 2024-10-28 NOTE — DISCHARGE INSTRUCTIONS
He was seen in the emergency department for abdominal pain and nausea and vomiting.  We performed lab work which is all reassuring.  Please follow-up with your bariatric team, and primary care for reevaluation.  Please return the emergency department should you experience any worsening of your abdominal pain, worsening nausea/vomiting, chest pain, shortness of breath, or any other concerning symptoms that you would like evaluated.

## 2024-10-28 NOTE — ED PROCEDURE NOTE
Procedure  POC Biliary US    Date/Time: 10/28/2024 12:40 PM    Performed by: Pedro Euceda DO  Authorized by: Pedro Euceda DO    Patient location:  ED  Performed by:  Resident  Other Assisting Provider: Yes (comment) (Tony Goel MD)    Procedure details:     Exam Type:  Diagnostic    Indications: upper right quadrant abdominal pain and epigastric pain      Assessment for:  Cholecystitis, cholelithiasis and mass    Views obtained: gallbladder (transverse and longitudinal), liver, common bile duct and portal triad      Image quality: diagnostic      Image availability:  Images available in PACS, video obtained and still images obtained  Findings:     Cholelithiasis: not identified      Common bile duct:  Normal    Common bile duct diameter (mm):  4    Gallbladder wall:  Normal    Gallbladder wall thickness (mm):  2    Pericholecystic fluid: not identified      Sonographic Hallman's sign: negative      Polyps: not identified      Mass: not identified    Interpretation:     Biliary ultrasound impressions: normal gallbladder ultrasound                     Pedro Euceda DO  10/28/24 1242

## 2024-10-28 NOTE — ED PROVIDER NOTES
Time reflects when diagnosis was documented in both MDM as applicable and the Disposition within this note       Time User Action Codes Description Comment    10/28/2024  2:47 PM Yvon Cheek [R10.9] Abdominal pain     10/28/2024  2:47 PM Yvon Cheek [R11.2] Nausea and vomiting           ED Disposition       ED Disposition   Discharge    Condition   Stable    Date/Time   Mon Oct 28, 2024  2:47 PM    Comment   Zina Wilder discharge to home/self care.                   Assessment & Plan       Medical Decision Making  Patient is a 34 y.o. female with PMH of anxiety, depression, hypertension, STD, history of small bowel obstruction who presents to the ED with nausea, vomiting, hematemesis, bright red blood per rectum.    Vital signs stable, borderline tachycardic. Physical exam as above.    History and physical exam most consistent with Wegovy side effect. However, differential diagnosis included but not limited to gastritis, pancreatitis, cholecystitis, hepatitis.     Plan: We will measure CBC, CMP, lipase, Zofran, Toradol, and 1 L LR.  Will also perform POC right upper quadrant ultrasound.    View ED course above for further discussion on patient workup.     On review of previous records, patient seen by PCP on 10/2/2024 for these symptoms.  Visit note reviewed..    All labs reviewed and utilized in the medical decision making process  All radiology studies independently viewed by me and interpreted by the radiologist.  I reviewed all testing with the patient.     Upon re-evaluation, patient noted improvement of pain.  Patient states she still has some discomfort, but was able to tolerate oral intake.    Disposition: Patient discharged stable condition.  Patient given strict return precautions.  Patient will follow-up with her primary care and bariatrics doctor in the next few days for reevaluation.    Portions of the record may have been created with voice recognition software. Occasional wrong word or  "\"sound a like\" substitutions may have occurred due to the inherent limitations of voice recognition software. Read the chart carefully and recognize, using context, where substitutions have occurred.     Amount and/or Complexity of Data Reviewed  Labs: ordered. Decision-making details documented in ED Course.  ECG/medicine tests:  Decision-making details documented in ED Course.    Risk  Prescription drug management.        ED Course as of 10/28/24 1536   Mon Oct 28, 2024   1244 CBC and differential(!)  Mildly elevated white blood cell count, normal hemoglobin, normal platelet count.   1245 No signs of cholecystitis on POCUS RUQ   1344 Lipase  Reassuring   1347 Comprehensive metabolic panel  Reassuring   1347 Patient reevaluated, she notes that her pain went away after medications, but has come back slightly. She thinks it may be related to hunger. PO challenge given   1403 ECG 12 lead  Procedure Note: EKG  Date/Time: 10/28/24 2:04 PM   Interpreted by: CYNTHIA GEE D.O.  Indications / Diagnosis: Epigastric pain  ECG reviewed by me, the ED Provider: yes   The EKG demonstrates: normal EKG, normal sinus rhythm, unchanged from previous tracings  Rhythm: normal sinus  Intervals: normal intervals  Axis: normal axis  QRS/Blocks: normal QRS  ST Changes: No acute ST Changes, no STD/MONSTER.        Medications   ondansetron (ZOFRAN) injection 4 mg (4 mg Intravenous Given 10/28/24 1217)   lactated ringers bolus 1,000 mL (0 mL Intravenous Stopped 10/28/24 1319)   ketorolac (TORADOL) injection 15 mg (15 mg Intravenous Given 10/28/24 1217)   sucralfate (CARAFATE) tablet 1 g (1 g Oral Given 10/28/24 1450)       ED Risk Strat Scores                                               History of Present Illness       Chief Complaint   Patient presents with    Vomiting     N/v since Saturday, sent by weight management. States she noticed blood in her emesis. On wegovy.        Past Medical History:   Diagnosis Date    Allergic     Anxiety     " Depression     Hypertension     SVT (supraventricular tachycardia) (HCC)       Past Surgical History:   Procedure Laterality Date    CARDIAC ELECTROPHYSIOLOGY PROCEDURE N/A 1/30/2023    Procedure: Cardiac eps/svt ablation;  Surgeon: José Loving DO;  Location: [facility];  Service: Cardiology    IR BIOPSY LIVER RANDOM  2/9/2023      Family History   Problem Relation Age of Onset    Depression Mother     Thyroid disease Mother     Hypertension Mother     Depression Father     Depression Maternal Grandmother     Thyroid disease Maternal Grandmother     Hypertension Maternal Grandmother       Social History     Tobacco Use    Smoking status: Never     Passive exposure: Never    Smokeless tobacco: Never   Vaping Use    Vaping status: Never Used   Substance Use Topics    Alcohol use: Yes     Alcohol/week: 2.0 standard drinks of alcohol     Types: 2 Glasses of wine per week     Comment: socially    Drug use: Never      E-Cigarette/Vaping    E-Cigarette Use Never User       E-Cigarette/Vaping Substances    Nicotine No     THC No     CBD No     Flavoring No     Other No     Unknown No       I have reviewed and agree with the history as documented.     Patient is a 34-year-old female with past medical history of anxiety, depression, hypertension, SVT, history of small bowel obstruction who presents today with nausea, vomiting, hematemesis, and bright red blood per rectum.  Patient notes that she has been on Wegovy for the past couple of months for weight loss.  Patient states that since starting Wegovy she has been has been having the symptoms.  Patient states that symptoms have been getting worse over the past couple of weeks.  Patient notes that typically her nausea and vomiting will resolve on its own after couple of days following her shot.  Patient states that over the past week her nausea and vomiting have persisted. Patient states that since starting Wegovy her abdominal pain has progressively worsened.  She  states that her epigastric and often radiates to her back.  Patient notes that a week or so ago she noticed blood in her bowel movement.  Patient states that she does have a history of hemorrhoids, but was not sure if that was what caused the blood in her stool.  Patient notes that she has had intermittent chills at home.  Patient denies any fevers, chest pain, shortness of breath, headaches, vision changes, weakness.        Review of Systems   Constitutional:  Positive for chills. Negative for fever.   HENT:  Negative for ear pain and sore throat.    Eyes:  Negative for pain and visual disturbance.   Respiratory:  Negative for cough and shortness of breath.    Cardiovascular:  Negative for chest pain and palpitations.   Gastrointestinal:  Positive for abdominal pain, blood in stool, diarrhea, nausea and vomiting.   Genitourinary:  Negative for difficulty urinating, dysuria, flank pain, frequency and hematuria.   Musculoskeletal:  Negative for arthralgias and back pain.   Skin:  Negative for color change and rash.   Neurological:  Negative for seizures, syncope, weakness, light-headedness and headaches.   All other systems reviewed and are negative.          Objective       ED Triage Vitals [10/28/24 1133]   Temperature Pulse Blood Pressure Respirations SpO2 Patient Position - Orthostatic VS   98 °F (36.7 °C) 97 149/75 18 97 % --      Temp src Heart Rate Source BP Location FiO2 (%) Pain Score    -- Monitor -- -- --      Vitals      Date and Time Temp Pulse SpO2 Resp BP Pain Score FACES Pain Rating User   10/28/24 1133 98 °F (36.7 °C) 97 97 % 18 149/75 -- -- ALYSSIA            Physical Exam  Vitals and nursing note reviewed. Exam conducted with a chaperone present.   Constitutional:       General: She is not in acute distress.     Appearance: She is well-developed.   HENT:      Head: Normocephalic and atraumatic.      Nose: Nose normal.      Mouth/Throat:      Mouth: Mucous membranes are moist.      Pharynx: Oropharynx is  clear.   Eyes:      Extraocular Movements: Extraocular movements intact.      Conjunctiva/sclera: Conjunctivae normal.      Pupils: Pupils are equal, round, and reactive to light.   Cardiovascular:      Rate and Rhythm: Normal rate and regular rhythm.      Pulses: Normal pulses.      Heart sounds: Normal heart sounds. No murmur heard.     No friction rub. No gallop.   Pulmonary:      Effort: Pulmonary effort is normal. No respiratory distress.      Breath sounds: Normal breath sounds.   Abdominal:      General: Abdomen is flat. Bowel sounds are normal. There is no distension.      Palpations: Abdomen is soft. There is no mass.      Tenderness: There is abdominal tenderness in the epigastric area. There is no guarding or rebound.   Genitourinary:     Rectum: Guaiac result negative. External hemorrhoid present. No mass, tenderness or anal fissure. Normal anal tone.   Musculoskeletal:         General: No swelling.      Cervical back: Normal range of motion and neck supple.   Skin:     General: Skin is warm and dry.      Capillary Refill: Capillary refill takes less than 2 seconds.   Neurological:      General: No focal deficit present.      Mental Status: She is alert and oriented to person, place, and time.   Psychiatric:         Mood and Affect: Mood normal.         Results Reviewed       Procedure Component Value Units Date/Time    Comprehensive metabolic panel [[id number]] Collected: 10/28/24 1217    Lab Status: Final result Specimen: Blood from Arm, Right Updated: 10/28/24 1341     Sodium 141 mmol/L      Potassium 4.0 mmol/L      Chloride 107 mmol/L      CO2 21 mmol/L      ANION GAP 13 mmol/L      BUN 9 mg/dL      Creatinine 0.62 mg/dL      Glucose 85 mg/dL      Calcium 9.1 mg/dL      AST 30 U/L      ALT 40 U/L      Alkaline Phosphatase 84 U/L      Total Protein 7.6 g/dL      Albumin 4.4 g/dL      Total Bilirubin 0.72 mg/dL      eGFR 119 ml/min/1.73sq m     Narrative:      [employer]  guidelines for Chronic Kidney Disease (CKD):     Stage 1 with normal or high GFR (GFR > 90 mL/min/1.73 square meters)    Stage 2 Mild CKD (GFR = 60-89 mL/min/1.73 square meters)    Stage 3A Moderate CKD (GFR = 45-59 mL/min/1.73 square meters)    Stage 3B Moderate CKD (GFR = 30-44 mL/min/1.73 square meters)    Stage 4 Severe CKD (GFR = 15-29 mL/min/1.73 square meters)    Stage 5 End Stage CKD (GFR <15 mL/min/1.73 square meters)  Note: GFR calculation is accurate only with a steady state creatinine    Lipase [[id number]]  (Normal) Collected: 10/28/24 1217    Lab Status: Final result Specimen: Blood from Arm, Right Updated: 10/28/24 1341     Lipase 39 u/L     CBC and differential [[id number]]  (Abnormal) Collected: 10/28/24 1217    Lab Status: Final result Specimen: Blood from Arm, Right Updated: 10/28/24 1227     WBC 11.98 Thousand/uL      RBC 5.25 Million/uL      Hemoglobin 15.2 g/dL      Hematocrit 46.0 %      MCV 88 fL      MCH 29.0 pg      MCHC 33.0 g/dL      RDW 12.7 %      MPV 10.1 fL      Platelets 256 Thousands/uL      nRBC 0 /100 WBCs      Segmented % 81 %      Immature Grans % 1 %      Lymphocytes % 13 %      Monocytes % 4 %      Eosinophils Relative 1 %      Basophils Relative 0 %      Absolute Neutrophils 9.74 Thousands/µL      Absolute Immature Grans 0.07 Thousand/uL      Absolute Lymphocytes 1.58 Thousands/µL      Absolute Monocytes 0.43 Thousand/µL      Eosinophils Absolute 0.12 Thousand/µL      Basophils Absolute 0.04 Thousands/µL             No orders to display       Procedures    ED Medication and Procedure Management   Prior to Admission Medications   Prescriptions Last Dose Informant Patient Reported? Taking?   Blood Glucose Monitoring Suppl (OneTouch Verio Reflect) w/Device KIT  Self No No   Sig: Check blood sugars once daily. Please substitute with appropriate alternative as covered by patient's insurance. Dx: E11.65   OneTouch Delica Lancets 33G MISC  Self No No   Sig: Check blood sugars once  daily. Please substitute with appropriate alternative as covered by patient's insurance. Dx: E11.65   Semaglutide-Weight Management (WEGOVY) 1.7 MG/0.75ML   No No   Sig: Inject 0.75 mL (1.7 mg total) under the skin once a week   benzonatate (TESSALON) 200 MG capsule  Self No No   Sig: Take 1 capsule (200 mg total) by mouth 3 (three) times a day as needed for cough   Patient not taking: Reported on 7/23/2024   citalopram (CeleXA) 20 mg tablet  Self No No   Sig: Take 1 tablet (20 mg total) by mouth daily   clotrimazole-betamethasone (LOTRISONE) 1-0.05 % cream  Self No No   Sig: Apply topically 2 (two) times a day   famotidine (PEPCID) 20 mg tablet  Self No No   Sig: Take 1 tablet (20 mg total) by mouth 2 (two) times a day for 12 days   glucose blood (OneTouch Verio) test strip  Self No No   Sig: Check blood sugars once daily. Please substitute with appropriate alternative as covered by patient's insurance. Dx: E11.65   ketorolac (TORADOL) 10 mg tablet  Self No No   Sig: Take 1 tablet (10 mg total) by mouth every 6 (six) hours as needed for moderate pain   lisinopril (ZESTRIL) 10 mg tablet  Self No No   Sig: Take 1 tablet (10 mg total) by mouth daily   meclizine (ANTIVERT) 25 mg tablet  Self No No   Sig: Take 1 tablet (25 mg total) by mouth 3 (three) times a day as needed for dizziness   meloxicam (MOBIC) 7.5 mg tablet  Self Yes No   Sig: Take 7.5 mg by mouth daily   methocarbamol (ROBAXIN) 500 mg tablet  Self No No   Sig: Take 1 tablet (500 mg total) by mouth 3 (three) times a day as needed for muscle spasms   metoclopramide (REGLAN) 5 mg tablet   No No   Sig: Take 1 tablet (5 mg total) by mouth 3 (three) times a day as needed (nasuea)   montelukast (SINGULAIR) 10 mg tablet   No No   Sig: Take 1 tablet (10 mg total) by mouth daily at bedtime   nystatin (MYCOSTATIN) powder  Self No No   Sig: Apply topically 3 (three) times a day   ondansetron (ZOFRAN) 4 mg tablet   No No   Sig: Take 1 tablet (4 mg total) by mouth every  8 (eight) hours as needed for nausea or vomiting for up to 10 days   pantoprazole (PROTONIX) 20 mg tablet   No No   Sig: Take 1 tablet (20 mg total) by mouth daily before breakfast   tiZANidine (ZANAFLEX) 2 mg tablet   Yes No   traZODone (DESYREL) 50 mg tablet  Self No No   Sig: Take 1 tablet (50 mg total) by mouth daily at bedtime as needed for sleep      Facility-Administered Medications: None     Discharge Medication List as of 10/28/2024  2:48 PM        CONTINUE these medications which have NOT CHANGED    Details   benzonatate (TESSALON) 200 MG capsule Take 1 capsule (200 mg total) by mouth 3 (three) times a day as needed for cough, Starting Wed 5/29/2024, Normal      Blood Glucose Monitoring Suppl (OneTouch Verio Reflect) w/Device KIT Check blood sugars once daily. Please substitute with appropriate alternative as covered by patient's insurance. Dx: E11.65, Normal      citalopram (CeleXA) 20 mg tablet Take 1 tablet (20 mg total) by mouth daily, Starting Wed 7/10/2024, Normal      clotrimazole-betamethasone (LOTRISONE) 1-0.05 % cream Apply topically 2 (two) times a day, Starting Tue 4/16/2024, Normal      famotidine (PEPCID) 20 mg tablet Take 1 tablet (20 mg total) by mouth 2 (two) times a day for 12 days, Starting Thu 8/24/2023, Until Tue 9/5/2023, Normal      glucose blood (OneTouch Verio) test strip Check blood sugars once daily. Please substitute with appropriate alternative as covered by patient's insurance. Dx: E11.65, Normal      ketorolac (TORADOL) 10 mg tablet Take 1 tablet (10 mg total) by mouth every 6 (six) hours as needed for moderate pain, Starting Thu 3/14/2024, Normal      lisinopril (ZESTRIL) 10 mg tablet Take 1 tablet (10 mg total) by mouth daily, Starting Mon 7/1/2024, Normal      meclizine (ANTIVERT) 25 mg tablet Take 1 tablet (25 mg total) by mouth 3 (three) times a day as needed for dizziness, Starting Mon 6/3/2024, Normal      meloxicam (MOBIC) 7.5 mg tablet Take 7.5 mg by mouth daily,  Historical Med      methocarbamol (ROBAXIN) 500 mg tablet Take 1 tablet (500 mg total) by mouth 3 (three) times a day as needed for muscle spasms, Starting Fri 6/28/2024, Normal      metoclopramide (REGLAN) 5 mg tablet Take 1 tablet (5 mg total) by mouth 3 (three) times a day as needed (nasuea), Starting Wed 10/2/2024, Normal      montelukast (SINGULAIR) 10 mg tablet Take 1 tablet (10 mg total) by mouth daily at bedtime, Starting Wed 8/28/2024, Until Tue 11/26/2024, Normal      nystatin (MYCOSTATIN) powder Apply topically 3 (three) times a day, Starting Tue 4/16/2024, Normal      ondansetron (ZOFRAN) 4 mg tablet Take 1 tablet (4 mg total) by mouth every 8 (eight) hours as needed for nausea or vomiting for up to 10 days, Starting Thu 8/8/2024, Until Sun 8/18/2024 at 2359, Normal      OneTouch Delica Lancets 33G MISC Check blood sugars once daily. Please substitute with appropriate alternative as covered by patient's insurance. Dx: E11.65, Normal      pantoprazole (PROTONIX) 20 mg tablet Take 1 tablet (20 mg total) by mouth daily before breakfast, Starting Wed 10/2/2024, Until Tue 12/31/2024, Normal      Semaglutide-Weight Management (WEGOVY) 1.7 MG/0.75ML Inject 0.75 mL (1.7 mg total) under the skin once a week, Starting Tue 9/3/2024, Normal      tiZANidine (ZANAFLEX) 2 mg tablet Historical Med      traZODone (DESYREL) 50 mg tablet Take 1 tablet (50 mg total) by mouth daily at bedtime as needed for sleep, Starting Fri 12/29/2023, Normal           No discharge procedures on file.  ED SEPSIS DOCUMENTATION   Time reflects when diagnosis was documented in both MDM as applicable and the Disposition within this note       Time User Action Codes Description Comment    10/28/2024  2:47 PM Yvon Cheek [R10.9] Abdominal pain     10/28/2024  2:47 PM Yvon Cheek [R11.2] Nausea and vomiting                  Yvon Cheek DO  10/28/24 1532

## 2024-11-05 DIAGNOSIS — I10 HYPERTENSION, UNSPECIFIED TYPE: ICD-10-CM

## 2024-11-06 RX ORDER — LISINOPRIL 10 MG/1
10 TABLET ORAL DAILY
Qty: 90 TABLET | Refills: 1 | Status: SHIPPED | OUTPATIENT
Start: 2024-11-06

## 2024-12-05 DIAGNOSIS — F41.9 ANXIETY: ICD-10-CM

## 2024-12-06 RX ORDER — CITALOPRAM HYDROBROMIDE 20 MG/1
20 TABLET ORAL DAILY
Qty: 90 TABLET | Refills: 1 | Status: SHIPPED | OUTPATIENT
Start: 2024-12-06

## 2024-12-16 DIAGNOSIS — E66.811 OBESITY, CLASS I, BMI 30-34.9: Primary | ICD-10-CM

## 2024-12-16 RX ORDER — TIRZEPATIDE 2.5 MG/.5ML
2.5 INJECTION, SOLUTION SUBCUTANEOUS WEEKLY
Qty: 2 ML | Refills: 0 | Status: SHIPPED | OUTPATIENT
Start: 2024-12-16 | End: 2024-12-16 | Stop reason: CLARIF

## 2024-12-18 ENCOUNTER — NURSE TRIAGE (OUTPATIENT)
Age: 34
End: 2024-12-18

## 2024-12-18 DIAGNOSIS — L65.9 HAIR LOSS: Primary | ICD-10-CM

## 2024-12-18 NOTE — TELEPHONE ENCOUNTER
Pt was seen 10/2/24 and she called in concerned about hair loss/thinning. She recently noticed a bald spot. Would you like to order labs or have her be seen? Please advise

## 2024-12-18 NOTE — TELEPHONE ENCOUNTER
"Patient contacted the office this afternoon in regards to hair loss she has been experiencing. Noticed a couple weeks ago thinning of hair and then recently a bald spot on head. She has not started any new medication or changed any hair products that could be causing this. Unsure if thyroid/irons levels could be off, is feeling fatigue but again unsure if that is just related to the change in weather, and is stressed with work but nothing out of the ordinary.     Does patient need to come into the office to be further evaluated or can lab work to check levels be ordered for her to complete. Please contact patient back with an update, thank you.   Reason for Disposition  • [1] Hair thinning, hair loss, or balding AND [2] cause not known (e.g., no recent precipitating factors such as childbirth, weight loss surgery)    Answer Assessment - Initial Assessment Questions  1. LOCATION: \"Where is the hair loss?\" (e.g., all of scalp, parts of scalp, back of head or neck)      Scalp of head  2. DESCRIPTION: \"What does it look like?\" (e.g., thinning of hair, balding, patches of hair missing)      Thinning of hair and bald spot  3. ONSET: \"When did the hair loss begin?\" (e.g., sudden or gradual onset; days, weeks, months or years ago)      2 weeks ago  4. OTHER SYMPTOMS: \"What does the scalp look like where the hair is missing?\" (e.g., normal, redness, crusts, scarring)      Didn't ask  5. OTHER FACTORS: \"Have you had any of the following recently: childbirth, severe illness or injury, major surgery, major weight loss, cancer chemo, tight hair radha, serious stress?\"      No Change  6. CAUSE: \"What do you think is causing the hair loss?\"      Unsure    Protocols used: Hair Loss-Adult-AH    "

## 2024-12-18 NOTE — TELEPHONE ENCOUNTER
I ordered CBC, iron panel, and TSH.  She has been on weight loss medications as well. Could be related to Wegovy/Zepbound as this is a common side effect.

## 2024-12-23 NOTE — TELEPHONE ENCOUNTER
Pt last seen 3/8/22 refilled as directed
Quality 226: Preventive Care And Screening: Tobacco Use: Screening And Cessation Intervention: Patient screened for tobacco use and is an ex/non-smoker
Detail Level: Detailed
Quality 358: Patient-Centered Surgical Risk Assessment And Communication: Documentation of patient-specific risk assessment with a risk calculator based on multi-institutional clinical data, the specific risk calculator used, and communication of risk assessment from risk calculator with the patient or family.

## 2024-12-27 DIAGNOSIS — E66.811 OBESITY, CLASS I, BMI 30-34.9: Primary | ICD-10-CM

## 2024-12-27 RX ORDER — TIRZEPATIDE 2.5 MG/.5ML
2.5 INJECTION, SOLUTION SUBCUTANEOUS WEEKLY
Qty: 2 ML | Refills: 0 | Status: SHIPPED | OUTPATIENT
Start: 2024-12-27 | End: 2025-01-24

## 2024-12-30 ENCOUNTER — TELEPHONE (OUTPATIENT)
Age: 34
End: 2024-12-30

## 2024-12-30 NOTE — TELEPHONE ENCOUNTER
PA for Zepbound APPROVED     Date(s) approved 12/30-12/30/25    Case # [id number]    Patient advised by          [x][email or web address] Message  [x]Phone call   []LMOM  []L/M to call office as no active Communication consent on file  []Unable to leave detailed message as VM not approved on Communication consent       Pharmacy advised by    []Fax  []Phone call    Approval letter scanned into Media No

## 2024-12-31 ENCOUNTER — APPOINTMENT (OUTPATIENT)
Dept: LAB | Facility: CLINIC | Age: 34
End: 2024-12-31
Payer: COMMERCIAL

## 2024-12-31 DIAGNOSIS — L65.9 HAIR LOSS: ICD-10-CM

## 2024-12-31 DIAGNOSIS — R74.01 ELEVATION OF LEVELS OF LIVER TRANSAMINASE LEVELS: ICD-10-CM

## 2024-12-31 DIAGNOSIS — R79.89 ELEVATED LFTS: ICD-10-CM

## 2024-12-31 DIAGNOSIS — K76.0 FATTY LIVER: ICD-10-CM

## 2024-12-31 LAB
ALBUMIN SERPL BCG-MCNC: 4.3 G/DL (ref 3.5–5)
ALP SERPL-CCNC: 60 U/L (ref 34–104)
ALT SERPL W P-5'-P-CCNC: 44 U/L (ref 7–52)
ANION GAP SERPL CALCULATED.3IONS-SCNC: 8 MMOL/L (ref 4–13)
AST SERPL W P-5'-P-CCNC: 32 U/L (ref 13–39)
BASOPHILS # BLD AUTO: 0.05 THOUSANDS/ΜL (ref 0–0.1)
BASOPHILS NFR BLD AUTO: 0 % (ref 0–1)
BILIRUB SERPL-MCNC: 0.49 MG/DL (ref 0.2–1)
BUN SERPL-MCNC: 12 MG/DL (ref 5–25)
CALCIUM SERPL-MCNC: 9.4 MG/DL (ref 8.4–10.2)
CHLORIDE SERPL-SCNC: 105 MMOL/L (ref 96–108)
CO2 SERPL-SCNC: 24 MMOL/L (ref 21–32)
CREAT SERPL-MCNC: 0.47 MG/DL (ref 0.6–1.3)
EOSINOPHIL # BLD AUTO: 0.12 THOUSAND/ΜL (ref 0–0.61)
EOSINOPHIL NFR BLD AUTO: 1 % (ref 0–6)
ERYTHROCYTE [DISTWIDTH] IN BLOOD BY AUTOMATED COUNT: 13.1 % (ref 11.6–15.1)
FERRITIN SERPL-MCNC: 68 NG/ML (ref 11–307)
GFR SERPL CREATININE-BSD FRML MDRD: 131 ML/MIN/1.73SQ M
GLUCOSE P FAST SERPL-MCNC: 94 MG/DL (ref 65–99)
HCT VFR BLD AUTO: 43.5 % (ref 34.8–46.1)
HGB BLD-MCNC: 14.4 G/DL (ref 11.5–15.4)
IMM GRANULOCYTES # BLD AUTO: 0.06 THOUSAND/UL (ref 0–0.2)
IMM GRANULOCYTES NFR BLD AUTO: 1 % (ref 0–2)
IRON SATN MFR SERPL: 15 % (ref 15–50)
IRON SERPL-MCNC: 57 UG/DL (ref 50–212)
LYMPHOCYTES # BLD AUTO: 3.26 THOUSANDS/ΜL (ref 0.6–4.47)
LYMPHOCYTES NFR BLD AUTO: 25 % (ref 14–44)
MCH RBC QN AUTO: 29.4 PG (ref 26.8–34.3)
MCHC RBC AUTO-ENTMCNC: 33.1 G/DL (ref 31.4–37.4)
MCV RBC AUTO: 89 FL (ref 82–98)
MONOCYTES # BLD AUTO: 0.56 THOUSAND/ΜL (ref 0.17–1.22)
MONOCYTES NFR BLD AUTO: 4 % (ref 4–12)
NEUTROPHILS # BLD AUTO: 9.03 THOUSANDS/ΜL (ref 1.85–7.62)
NEUTS SEG NFR BLD AUTO: 69 % (ref 43–75)
NRBC BLD AUTO-RTO: 0 /100 WBCS
PLATELET # BLD AUTO: 295 THOUSANDS/UL (ref 149–390)
PMV BLD AUTO: 10.3 FL (ref 8.9–12.7)
POTASSIUM SERPL-SCNC: 4.5 MMOL/L (ref 3.5–5.3)
PROT SERPL-MCNC: 7.5 G/DL (ref 6.4–8.4)
RBC # BLD AUTO: 4.89 MILLION/UL (ref 3.81–5.12)
SODIUM SERPL-SCNC: 137 MMOL/L (ref 135–147)
TIBC SERPL-MCNC: 387.8 UG/DL (ref 250–450)
TRANSFERRIN SERPL-MCNC: 277 MG/DL (ref 203–362)
TSH SERPL DL<=0.05 MIU/L-ACNC: 2.76 UIU/ML (ref 0.45–4.5)
UIBC SERPL-MCNC: 331 UG/DL (ref 155–355)
WBC # BLD AUTO: 13.08 THOUSAND/UL (ref 4.31–10.16)

## 2024-12-31 PROCEDURE — 82728 ASSAY OF FERRITIN: CPT

## 2024-12-31 PROCEDURE — 83540 ASSAY OF IRON: CPT

## 2024-12-31 PROCEDURE — 84443 ASSAY THYROID STIM HORMONE: CPT

## 2024-12-31 PROCEDURE — 36415 COLL VENOUS BLD VENIPUNCTURE: CPT

## 2024-12-31 PROCEDURE — 85025 COMPLETE CBC W/AUTO DIFF WBC: CPT

## 2024-12-31 PROCEDURE — 83550 IRON BINDING TEST: CPT

## 2024-12-31 PROCEDURE — 80053 COMPREHEN METABOLIC PANEL: CPT

## 2025-01-01 ENCOUNTER — RESULTS FOLLOW-UP (OUTPATIENT)
Age: 35
End: 2025-01-01

## 2025-01-08 ENCOUNTER — ANNUAL EXAM (OUTPATIENT)
Dept: OBGYN CLINIC | Facility: CLINIC | Age: 35
End: 2025-01-08
Payer: COMMERCIAL

## 2025-01-08 VITALS
BODY MASS INDEX: 31.81 KG/M2 | WEIGHT: 214.8 LBS | SYSTOLIC BLOOD PRESSURE: 110 MMHG | DIASTOLIC BLOOD PRESSURE: 74 MMHG | HEIGHT: 69 IN

## 2025-01-08 DIAGNOSIS — N64.4 BREAST PAIN: ICD-10-CM

## 2025-01-08 DIAGNOSIS — Z01.419 ENCNTR FOR GYN EXAM (GENERAL) (ROUTINE) W/O ABN FINDINGS: Primary | ICD-10-CM

## 2025-01-08 DIAGNOSIS — N92.6 IRREGULAR MENSES: ICD-10-CM

## 2025-01-08 PROBLEM — Z97.5 IUD (INTRAUTERINE DEVICE) IN PLACE: Status: RESOLVED | Noted: 2022-11-17 | Resolved: 2025-01-08

## 2025-01-08 PROCEDURE — G0145 SCR C/V CYTO,THINLAYER,RESCR: HCPCS | Performed by: PHYSICIAN ASSISTANT

## 2025-01-08 PROCEDURE — G0476 HPV COMBO ASSAY CA SCREEN: HCPCS | Performed by: PHYSICIAN ASSISTANT

## 2025-01-08 PROCEDURE — S0612 ANNUAL GYNECOLOGICAL EXAMINA: HCPCS | Performed by: PHYSICIAN ASSISTANT

## 2025-01-08 RX ORDER — MEDROXYPROGESTERONE ACETATE 10 MG
10 TABLET ORAL DAILY
Qty: 10 TABLET | Refills: 0 | Status: SHIPPED | OUTPATIENT
Start: 2025-01-08 | End: 2025-01-18

## 2025-01-08 NOTE — PROGRESS NOTES
Assessment   34 y.o.  presenting for annual exam.     Plan   Diagnoses and all orders for this visit:    Encntr for gyn exam (general) (routine) w/o abn findings  -     Liquid-based pap, screening    Irregular menses  -     Testosterone; Future  -     DHEA-sulfate; Future  -     17-Hydroxyprogesterone; Future  -     Prolactin; Future  -     Follicle stimulating hormone; Future  -     Estradiol; Future  -     Luteinizing hormone; Future  -     medroxyPROGESTERone (PROVERA) 10 mg tablet; Take 1 tablet (10 mg total) by mouth daily for 10 days    Breast pain  -     US BREAST BILATERAL LIMITED (DIAGNOSTIC); Future        Pap collected today  Irregular menses/oligomenorrhea - menses roughly every 4 months. Reviewed need for withdrawal bleeding every 90 days with Provera. She will track menses and monitor. Rx sent to pharmacy for Provera. Labs ordered to work up irregular cycles. She is infrequently sexually active and uses condoms when active. Not interested in alternate birth control option at this time.   Breast pain- b/l; present daily and constant over the past 6 months. Discussed wearing a better fitting bra, trial of evening primrose oil/vitamin E supplements. Orders placed for b/l US    Perineal hygiene reviewed. Weight bearing exercises minium of 150 mins/weekly advised. Kegel exercises recommended.   SBE encouraged, A yearly mammogram is recommended for breast cancer screening starting at age 40. ASCCP guidelines reviewed. Condoms encouraged with all sexual activity to prevent STI's. Gardisil vaccines recommended up to age 45. Calcium/ Vit D dietary requirements discussed.   Advised to call with any issues, all concerns & questions addressed.   See provided information in your after visit summary     F/U Annually and PRN    Results will be released to [employer], if abnormal will call or message to review and discuss treatment plan.      __________________________________________________________________    Subjective     Zina Hzd is a 34 y.o.  presenting for annual exam.     Has concerns today about irregular menses. She did just start GLP-1 agonist and is hoping this will help with cycle regulation. Irregular menses have been present since the birth of her last child. Menses roughly every 4 months. Does not use Provera for withdrawal bleed. Not on birth control. Infrequently sexually active and using condoms.    Also reports new onset of persistent b/l breast pain which has been present for about 6 months. Constant. Denies lumps, nipple discharge. She does not drink caffeine    SCREENING  Last Pap: 2020 negative     GYN    Sexually active:  infrequently  Contraception: condoms    Hx Abnormal pap: denies  We reviewed ASCCP guidelines for Pap testing today.    Denies vaginal discharge, itching, odor, dyspareunia, pelvic pain and vulvar/vaginal symptoms      OB           Complaints: denies   Denies urgency, frequency, hematuria, leakage / change in stream, difficulty urinating.       Pertinent Family Hx:   Family hx of breast cancer: maternal aunt x 2  Family hx of ovarian cancer: no  Family hx of colon cancer: no      GENERAL  PMH reviewed/updated and is as below.   Patient does follow with a PCP.    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      Past Medical History:   Diagnosis Date    Allergic     Anxiety     Depression     Hypertension     SVT (supraventricular tachycardia) (HCC)        Past Surgical History:   Procedure Laterality Date    CARDIAC ELECTROPHYSIOLOGY PROCEDURE N/A 2023    Procedure: Cardiac eps/svt ablation;  Surgeon: José Loving DO;  [facility];  Service: Cardiology    IR BIOPSY LIVER RANDOM  2023         Current Outpatient Medications:     citalopram (CeleXA) 20 mg tablet, Take 1 tablet (20 mg total) by mouth daily,  Disp: 90 tablet, Rfl: 1    clotrimazole-betamethasone (LOTRISONE) 1-0.05 % cream, Apply topically 2 (two) times a day, Disp: 45 g, Rfl: 0    famotidine (PEPCID) 20 mg tablet, Take 1 tablet (20 mg total) by mouth 2 (two) times a day for 12 days, Disp: 24 tablet, Rfl: 0    lisinopril (ZESTRIL) 10 mg tablet, Take 1 tablet (10 mg total) by mouth daily, Disp: 90 tablet, Rfl: 1    meclizine (ANTIVERT) 25 mg tablet, Take 1 tablet (25 mg total) by mouth 3 (three) times a day as needed for dizziness, Disp: 30 tablet, Rfl: 0    metoclopramide (REGLAN) 5 mg tablet, Take 1 tablet (5 mg total) by mouth 3 (three) times a day as needed (nasuea), Disp: 30 tablet, Rfl: 0    montelukast (SINGULAIR) 10 mg tablet, Take 1 tablet (10 mg total) by mouth daily at bedtime, Disp: 90 tablet, Rfl: 0    nystatin (MYCOSTATIN) powder, Apply topically 3 (three) times a day, Disp: 30 g, Rfl: 1    ondansetron (ZOFRAN) 4 mg tablet, Take 1 tablet (4 mg total) by mouth every 8 (eight) hours as needed for nausea or vomiting for up to 10 days, Disp: 20 tablet, Rfl: 0    tirzepatide (Zepbound) 2.5 mg/0.5 mL auto-injector, Inject 0.5 mL (2.5 mg total) under the skin once a week for 28 days, Disp: 2 mL, Rfl: 0    benzonatate (TESSALON) 200 MG capsule, Take 1 capsule (200 mg total) by mouth 3 (three) times a day as needed for cough (Patient not taking: Reported on 7/23/2024), Disp: 20 capsule, Rfl: 0    Blood Glucose Monitoring Suppl (OneTouch Verio Reflect) w/Device KIT, Check blood sugars once daily. Please substitute with appropriate alternative as covered by patient's insurance. Dx: E11.65 (Patient not taking: Reported on 1/8/2025), Disp: 1 kit, Rfl: 0    glucose blood (OneTouch Verio) test strip, Check blood sugars once daily. Please substitute with appropriate alternative as covered by patient's insurance. Dx: E11.65 (Patient not taking: Reported on 1/8/2025), Disp: 100 each, Rfl: 3    ketorolac (TORADOL) 10 mg tablet, Take 1 tablet (10 mg total) by  mouth every 6 (six) hours as needed for moderate pain (Patient not taking: Reported on 1/8/2025), Disp: 30 tablet, Rfl: 0    meloxicam (MOBIC) 7.5 mg tablet, Take 7.5 mg by mouth daily (Patient not taking: Reported on 1/8/2025), Disp: , Rfl:     methocarbamol (ROBAXIN) 500 mg tablet, Take 1 tablet (500 mg total) by mouth 3 (three) times a day as needed for muscle spasms (Patient not taking: Reported on 1/8/2025), Disp: 30 tablet, Rfl: 1    OneTouch Delica Lancets 33G MISC, Check blood sugars once daily. Please substitute with appropriate alternative as covered by patient's insurance. Dx: E11.65 (Patient not taking: Reported on 1/8/2025), Disp: 100 each, Rfl: 3    pantoprazole (PROTONIX) 20 mg tablet, Take 1 tablet (20 mg total) by mouth daily before breakfast (Patient not taking: Reported on 1/8/2025), Disp: 90 tablet, Rfl: 0    tiZANidine (ZANAFLEX) 2 mg tablet, , Disp: , Rfl:     traZODone (DESYREL) 50 mg tablet, Take 1 tablet (50 mg total) by mouth daily at bedtime as needed for sleep (Patient not taking: Reported on 1/8/2025), Disp: 30 tablet, Rfl: 0    No Known Allergies    Social History     Socioeconomic History    Marital status: Single     Spouse name: Not on file    Number of children: Not on file    Years of education: Not on file    Highest education level: Not on file   Occupational History    Not on file   Tobacco Use    Smoking status: Never     Passive exposure: Never    Smokeless tobacco: Never   Vaping Use    Vaping status: Never Used   Substance and Sexual Activity    Alcohol use: Yes     Alcohol/week: 2.0 standard drinks of alcohol     Types: 1 Glasses of wine, 1 Cans of beer per week     Comment: Socially    Drug use: Never    Sexual activity: Yes     Partners: Male     Birth control/protection: None   Other Topics Concern    Not on file   Social History Narrative    Not on file     Social Drivers of Health     Financial Resource Strain: Not on file   Food Insecurity: Not on file  "  Transportation Needs: Not on file   Physical Activity: Not on file   Stress: Not on file   Social Connections: Not on file   Intimate Partner Violence: Not on file   Housing Stability: Not on file       Review of Systems     ROS:  Constitutional: Negative for fatigue and unexpected weight change.   Respiratory: Negative for cough and shortness of breath.    Cardiovascular: Negative for chest pain and palpitations.   Gastrointestinal: Negative for abdominal pain and change in bowel habits  Breasts:  Negative, other than as noted above.   Genitourinary: Negative, other than as noted above.   Psychiatric: Negative for mood difficulties.      Objective      /74 (BP Location: Left arm, Patient Position: Sitting, Cuff Size: Standard)   Ht 5' 9\" (1.753 m)   Wt 97.4 kg (214 lb 12.8 oz)   LMP 12/14/2024 (Exact Date)   BMI 31.72 kg/m²     Physical Examination:    Patient appears well and is not in distress  Neck is supple without masses, no cervical or supraclavicular lymphadenopathy  Cardiovascular: regular rate and rhythm; no murmurs  Lungs: clear to auscultation bilaterally; no wheezes  Breasts are symmetrical without mass, tenderness, nipple discharge, skin changes or adenopathy.   Abdomen is soft and nontender without masses.   External genitals are normal without lesions or rashes.  Urethral meatus and urethra are normal  Bladder is normal to palpation  Vagina is normal without discharge or bleeding.   Cervix is normal without discharge or lesion.   Uterus is normal, mobile, nontender without palpable mass.  Adnexa are normal, nontender, without palpable mass.                 "

## 2025-01-09 LAB
HPV HR 12 DNA CVX QL NAA+PROBE: NEGATIVE
HPV16 DNA CVX QL NAA+PROBE: NEGATIVE
HPV18 DNA CVX QL NAA+PROBE: NEGATIVE

## 2025-01-11 ENCOUNTER — APPOINTMENT (OUTPATIENT)
Dept: LAB | Facility: CLINIC | Age: 35
End: 2025-01-11
Payer: COMMERCIAL

## 2025-01-11 DIAGNOSIS — N92.6 IRREGULAR MENSES: ICD-10-CM

## 2025-01-11 LAB
ESTRADIOL SERPL-MCNC: 46.7 PG/ML
FSH SERPL-ACNC: 3.2 MIU/ML
LH SERPL-ACNC: 4.5 MIU/ML
PROLACTIN SERPL-MCNC: 19.49 NG/ML (ref 3.34–26.72)
TESTOST SERPL-MSCNC: 30 NG/DL (ref 0–75)

## 2025-01-11 PROCEDURE — 36415 COLL VENOUS BLD VENIPUNCTURE: CPT

## 2025-01-11 PROCEDURE — 83001 ASSAY OF GONADOTROPIN (FSH): CPT

## 2025-01-11 PROCEDURE — 84146 ASSAY OF PROLACTIN: CPT

## 2025-01-11 PROCEDURE — 83002 ASSAY OF GONADOTROPIN (LH): CPT

## 2025-01-11 PROCEDURE — 82627 DEHYDROEPIANDROSTERONE: CPT

## 2025-01-11 PROCEDURE — 82670 ASSAY OF TOTAL ESTRADIOL: CPT

## 2025-01-11 PROCEDURE — 83498 ASY HYDROXYPROGESTERONE 17-D: CPT

## 2025-01-11 PROCEDURE — 84403 ASSAY OF TOTAL TESTOSTERONE: CPT

## 2025-01-12 LAB — DHEA-S SERPL-MCNC: 151 UG/DL (ref 84.8–378)

## 2025-01-13 LAB
LAB AP GYN PRIMARY INTERPRETATION: NORMAL
Lab: NORMAL

## 2025-01-14 ENCOUNTER — RESULTS FOLLOW-UP (OUTPATIENT)
Dept: FAMILY MEDICINE CLINIC | Facility: CLINIC | Age: 35
End: 2025-01-14

## 2025-01-14 ENCOUNTER — RESULTS FOLLOW-UP (OUTPATIENT)
Dept: OBGYN CLINIC | Facility: CLINIC | Age: 35
End: 2025-01-14

## 2025-01-14 LAB — 17OHP SERPL-MCNC: 26 NG/DL

## 2025-01-17 DIAGNOSIS — E66.811 OBESITY, CLASS I, BMI 30-34.9: ICD-10-CM

## 2025-01-20 RX ORDER — TIRZEPATIDE 2.5 MG/.5ML
INJECTION, SOLUTION SUBCUTANEOUS
Qty: 2 ML | Refills: 0 | OUTPATIENT
Start: 2025-01-20

## 2025-01-22 DIAGNOSIS — I10 HYPERTENSION, UNSPECIFIED TYPE: ICD-10-CM

## 2025-01-22 RX ORDER — LISINOPRIL 10 MG/1
10 TABLET ORAL DAILY
Qty: 90 TABLET | Refills: 1 | Status: SHIPPED | OUTPATIENT
Start: 2025-01-22

## 2025-01-27 ENCOUNTER — PATIENT MESSAGE (OUTPATIENT)
Age: 35
End: 2025-01-27

## 2025-01-27 DIAGNOSIS — E66.811 OBESITY, CLASS I, BMI 30-34.9: ICD-10-CM

## 2025-01-27 RX ORDER — TIRZEPATIDE 2.5 MG/.5ML
INJECTION, SOLUTION SUBCUTANEOUS
Qty: 2 ML | Refills: 0 | OUTPATIENT
Start: 2025-01-27

## 2025-01-29 DIAGNOSIS — J30.2 SEASONAL ALLERGIC RHINITIS, UNSPECIFIED TRIGGER: ICD-10-CM

## 2025-01-30 RX ORDER — MONTELUKAST SODIUM 10 MG/1
10 TABLET ORAL
Qty: 90 TABLET | Refills: 1 | Status: SHIPPED | OUTPATIENT
Start: 2025-01-30 | End: 2025-07-29

## 2025-02-07 ENCOUNTER — OFFICE VISIT (OUTPATIENT)
Age: 35
End: 2025-02-07
Payer: COMMERCIAL

## 2025-02-07 VITALS
BODY MASS INDEX: 31.67 KG/M2 | WEIGHT: 213.8 LBS | SYSTOLIC BLOOD PRESSURE: 120 MMHG | HEIGHT: 69 IN | RESPIRATION RATE: 16 BRPM | DIASTOLIC BLOOD PRESSURE: 60 MMHG | HEART RATE: 72 BPM | TEMPERATURE: 96.5 F

## 2025-02-07 DIAGNOSIS — I10 HYPERTENSION, UNSPECIFIED TYPE: ICD-10-CM

## 2025-02-07 DIAGNOSIS — E66.811 OBESITY, CLASS I, BMI 30-34.9: Primary | ICD-10-CM

## 2025-02-07 DIAGNOSIS — K75.81 NASH (NONALCOHOLIC STEATOHEPATITIS): ICD-10-CM

## 2025-02-07 DIAGNOSIS — R73.03 PREDIABETES: ICD-10-CM

## 2025-02-07 PROCEDURE — 99214 OFFICE O/P EST MOD 30 MIN: CPT | Performed by: PHYSICIAN ASSISTANT

## 2025-02-07 RX ORDER — TIRZEPATIDE 5 MG/.5ML
5 INJECTION, SOLUTION SUBCUTANEOUS WEEKLY
Qty: 2 ML | Refills: 2 | Status: SHIPPED | OUTPATIENT
Start: 2025-02-07 | End: 2025-05-02

## 2025-02-07 NOTE — PROGRESS NOTES
Assessment/Plan:  Kayleigh was seen today for follow-up.    Diagnoses and all orders for this visit:    Obesity, Class I, BMI 30-34.9    Hypertension, unspecified type    Hyperglycemia    RIVER (nonalcoholic steatohepatitis)    BMI 31.0-31.9,adult        - Weight not at goal  - Patient is interested in Conservative Program  - Labs reviewed: As below.    General Recommendations:  Nutrition:  Eat breakfast daily.  Do not skip meals.     Food log (ie.) www.Egomotion.com, sparkpeople.com, loseit.com, calorieking.com, etc.    Practice mindful eating.  Be sure to set aside time to eat, eat slowly, and savor your food.    Hydration:    At least 64oz of water daily.  No sugar sweetened beverages.  No juice (eat the fruit instead).    Exercise:  Studies have shown that the ideal exercise goal is somewhere between 150 to 300 minutes of moderate intensity exercise a week.  Start with exercising 10 minutes every other day and gradually increase physical activity with a goal of at least 150 minutes of moderate intensity exercise a week, divided over at least 3 days a week.  An example of this would be exercising 30 minutes a day, 5 days a week.  Resistance training can increase muscle mass and increase our resting metabolic rate.   FULL BODY resistance training is recommended 2-3 times a week.  Do not do this on consecutive days to allow for muscle recovery.    Aim for a bare minimum 5000 steps, even on days you do not exercise.    Monitoring:   Weigh yourself daily.  If this causes undue stress, then just weigh yourself once a week.  Weigh yourself the same time of the day with the same amount of clothing on.  Preferably this should be done after waking up, before you eat, and with no clothing or minimal clothing on.    Specific Goals:  Goal protein intake of 60-80 grams per day  Gradually increase physical activity to a goal of 5 days per week for 30 minutes of MODERATE intensity PLUS 2 days per week of FULL BODY resistance  training  No sugary beverages. At least 64oz of water daily.    Calorie goal:  [id number] percy/day    Return visit:    Zepbound 5mg once weekly       - Side effects of Zepbound include nausea, vomiting, diarrhea, or constipation. Keep an eye on your heart rate while on Zepbound. If you resting heart rate is greater than 100 beats per minutes, please notify me. If you develop severe abdominal pain, stop Zepbound and go to the emergency room, as that could be a sign of pancreatitis.     - Please notify me if you have surgery, upper endoscopy, or colonoscopy scheduled, as we typically hold Zepbound for one week prior to the procedure.     - Zepbound can reduce the effectiveness of oral hormonal birth control (birth control pills). Recommend a barrier backup method such as condoms to prevent pregnancy.      Referral to meal planning with dietitians   Calorie tracking/calorie deficit  Physical activity - as tolerated with her knee arthralgia  AOMl  Contraception: condoms. Patient will take a pregnancy test prior to starting pharmacotherapy.   Not a candidate for phentermine d/t hx of SVT  Patient interested in restarting GLP1 RA  - Patient denies personal history of pancreatitis. Patient also denies personal and family history of thyroid cancer and multiple endocrine neoplasia type 2 (MEN 2 tumor).   RTC in 2 months   Nutrition RX:  - start tracking intake  - focus on protein- goal is 30 grams per meal; 90 grams per day  - focus on increasing fiber first by increasing vegetable servings per day  - do not skip breakfast and goal water intake 64 oz daily    Physical Activity RX:  - Goal is 150-200 mins of activity weekly.  Try to include at least 2 strength training sessions; increasing lean body mass will really help you to lose weight and maintain weight loss.              Total time spent reviewing chart, interviewing patient, examining patient, discussing plan, answering all questions, and documentin min.        ______________________________________________________________________        Subjective:   Chief Complaint   Patient presents with    Follow-up     MWM- 9 mo f/u; Waist-38in       HPI: Zina Hdz  is a 34 y.o. female with history of SVT, HTN, RIVER, depression, insomnia, and excess weight, here to pursue weight loss management.  Previous notes and records have been reviewed.    Patient has been on Zepbound 2.5mg x 1 month; no reported side effects. Feels hunger is coming back a little.     Physical Activity:  Started going back to the gym; 30 mins of cardio  Strength training 1x per week   Joined [employer] as well and is doing this 1x per week as well    Food Recall:  B- overnight oats (tends to skip)  L- PB&J sandwich   D- cooks at home, chicken and vegetable, chili, steak and vegetable     Prediabetes - hemoglobin A1c 5.7 (3/27/24)  TSH WNL    HTN - lisinopril  RIVER - follows with hepatology. Chronically elevated LFTs  Depression -   celexa    Previously took wegovy. In 8/2023 she had been late a few weeks and restarted a higher dose. Those ultimately led to a hospitalization for uncontrolled nausea and vomiting. Lipase was WNL and no radiographic findings of pancreatitis on CT.  Other than that episode, she felt she tolerated the medicine well and was having good weight loss results. She is interested in restarting.     Decreased physical activity with left knee arthralgia. + RA    Dietary - trying to minimize starches. Portion control     Currently not using contraception - had IUD removed months ago. Has not had return of menstrual cycle.   Patient will commit to condoms and discuss with OBGYN further options.     HPI  Wt Readings from Last 20 Encounters:   02/07/25 97 kg (213 lb 12.8 oz)   01/08/25 97.4 kg (214 lb 12.8 oz)   10/02/24 93.4 kg (205 lb 12.8 oz)   08/08/24 99.8 kg (220 lb)   07/23/24 104 kg (229 lb 6.4 oz)   07/22/24 103 kg (228 lb)   05/29/24 104 kg (229 lb 6.4 oz)   05/16/24 104 kg (230 lb  3.2 oz)   04/16/24 105 kg (231 lb 6 oz)   03/13/24 108 kg (239 lb)   08/30/23 98.9 kg (218 lb)   05/23/23 101 kg (222 lb)   04/26/23 104 kg (229 lb)   04/25/23 104 kg (229 lb)   04/17/23 105 kg (231 lb)   03/06/23 105 kg (232 lb)   02/09/23 103 kg (227 lb 9.6 oz)   01/30/23 103 kg (226 lb 6.4 oz)   12/23/22 101 kg (223 lb)   12/08/22 103 kg (226 lb 3.2 oz)     Excess Weight:  Onset:   Since first pregnancy in 2013   Highest weight: 240/250 (2022)  Current weight: 230  What has been tried: Diet and Exercise and Commercial Weight Loss Programs-ie. Weight Watchers, ShopVisible, Nutrisystem, etc.        Food logging:  B:  skips or yogurt  S: skip  L: sandwich, salads at cafeteria    S: skips  D: tacos, salads, protein (chicken, steak, salmon), veggies (broccoli, carrots) + starch (corn, rice)  S: skips      Dining out: 1-2x  Hydration: Water - 80+ oz    Coffee 1 cup, creamer    Rare soda  Alcohol: Socially. 0-1 drink/month  Smoking: none  Exercise: None, restricted due to arthralgia  Occupation: MA at [employer]      Past Medical History:   Diagnosis Date    Allergic     Anxiety     Depression     Hypertension     SVT (supraventricular tachycardia) (HCC)      Patient denies personal and family history of  pancreatitis, thyroid cancer, MEN-2 tumors.  Denies any hx of glaucoma, seizures, kidney stones, gallstones.  + hx of SVT  Denies uncontrolled anxiety or depression, suicidal behavior or thinking , insomnia or sleep disturbance.     Past Surgical History:   Procedure Laterality Date    CARDIAC ELECTROPHYSIOLOGY PROCEDURE N/A 1/30/2023    Procedure: Cardiac eps/svt ablation;  Surgeon: José Loving DO;  Location: [facility];  Service: Cardiology    IR BIOPSY LIVER RANDOM  2/9/2023     The following portions of the patient's history were reviewed and updated as appropriate: allergies, current medications, past family history, past medical history, past social history, past surgical history, and  "problem list.    Review Of Systems:  Review of Systems   Constitutional:  Negative for fatigue and fever.   HENT:  Negative for sore throat, trouble swallowing and voice change.    Respiratory:  Negative for shortness of breath.    Cardiovascular:  Negative for chest pain.   Gastrointestinal:  Positive for constipation (every other day). Negative for abdominal pain, diarrhea, nausea and vomiting.   Endocrine: Negative for cold intolerance and heat intolerance.   Genitourinary:  Negative for difficulty urinating.   Musculoskeletal:  Negative for arthralgias and back pain.   Psychiatric/Behavioral:  Negative for suicidal ideas. The patient is not nervous/anxious.    All other systems reviewed and are negative.      Objective:  /60 (BP Location: Left arm, Patient Position: Sitting)   Pulse 72   Temp (!) 96.5 °F (35.8 °C) (Tympanic)   Resp 16   Ht 5' 9\" (1.753 m)   Wt 97 kg (213 lb 12.8 oz)   LMP 12/14/2024 (Exact Date)   BMI 31.57 kg/m²   Physical Exam    Labs and Imaging  Recent labs and imaging have been personally reviewed.  Lab Results   Component Value Date    WBC 13.08 (H) 12/31/2024    HGB 14.4 12/31/2024    HCT 43.5 12/31/2024    MCV 89 12/31/2024     12/31/2024     Lab Results   Component Value Date    SODIUM 137 12/31/2024    K 4.5 12/31/2024     12/31/2024    CO2 24 12/31/2024    AGAP 8 12/31/2024    BUN 12 12/31/2024    CREATININE 0.47 (L) 12/31/2024    GLUC 85 10/28/2024    GLUF 94 12/31/2024    CALCIUM 9.4 12/31/2024    AST 32 12/31/2024    ALT 44 12/31/2024    ALKPHOS 60 12/31/2024    TP 7.5 12/31/2024    TBILI 0.49 12/31/2024    EGFR 131 12/31/2024     Lab Results   Component Value Date    HGBA1C 5.7 (H) 03/27/2024     Lab Results   Component Value Date    ZMD6UCVSTXGB 2.761 12/31/2024    TSH 1.53 04/09/2022     Lab Results   Component Value Date    CHOLESTEROL 155 03/27/2024     Lab Results   Component Value Date    HDL 25 (L) 03/27/2024     Lab Results   Component Value Date "    TRIG 155 (H) 03/27/2024     Lab Results   Component Value Date    LDLCALC 99 03/27/2024

## 2025-02-07 NOTE — PATIENT INSTRUCTIONS
Zepbound 5mg once weekly       - Side effects of Zepbound include nausea, vomiting, diarrhea, or constipation. Keep an eye on your heart rate while on Zepbound. If you resting heart rate is greater than 100 beats per minutes, please notify me. If you develop severe abdominal pain, stop Zepbound and go to the emergency room, as that could be a sign of pancreatitis.     - Please notify me if you have surgery, upper endoscopy, or colonoscopy scheduled, as we typically hold Zepbound for one week prior to the procedure.     - Zepbound can reduce the effectiveness of oral hormonal birth control (birth control pills). Recommend a barrier backup method such as condoms to prevent pregnancy.      Referral to meal planning with dietitians   Calorie tracking/calorie deficit  Physical activity - as tolerated with her knee arthralgia  AOMl  Contraception: condoms. Patient will take a pregnancy test prior to starting pharmacotherapy.   Not a candidate for phentermine d/t hx of SVT  Patient interested in restarting GLP1 RA  - Patient denies personal history of pancreatitis. Patient also denies personal and family history of thyroid cancer and multiple endocrine neoplasia type 2 (MEN 2 tumor).   RTC in 2 months   Nutrition RX:  - start tracking intake  - focus on protein- goal is 30 grams per meal; 90 grams per day  - focus on increasing fiber first by increasing vegetable servings per day  - do not skip breakfast and goal water intake 64 oz daily    Physical Activity RX:  - Goal is 150-200 mins of activity weekly.  Try to include at least 2 strength training sessions; increasing lean body mass will really help you to lose weight and maintain weight loss.

## 2025-03-05 DIAGNOSIS — I10 HYPERTENSION, UNSPECIFIED TYPE: ICD-10-CM

## 2025-03-05 DIAGNOSIS — R73.03 PREDIABETES: ICD-10-CM

## 2025-03-05 DIAGNOSIS — K75.81 NASH (NONALCOHOLIC STEATOHEPATITIS): ICD-10-CM

## 2025-03-05 DIAGNOSIS — E66.811 OBESITY, CLASS I, BMI 30-34.9: ICD-10-CM

## 2025-03-05 RX ORDER — TIRZEPATIDE 5 MG/.5ML
5 INJECTION, SOLUTION SUBCUTANEOUS WEEKLY
Qty: 2 ML | Refills: 0 | OUTPATIENT
Start: 2025-03-05 | End: 2025-05-28

## 2025-03-14 ENCOUNTER — TELEPHONE (OUTPATIENT)
Age: 35
End: 2025-03-14

## 2025-03-14 NOTE — TELEPHONE ENCOUNTER
Patient states that she had an MMR titer that was low. She could not get an MMR booster at that time because she was pregnant. Would like to know if she should get a booster since there is a measles outbreak. Please follow up with patient via call or Mychart.

## 2025-03-14 NOTE — TELEPHONE ENCOUNTER
The last mmr titers I see in her chart was 1/19/22 at she was not immune to the rubeola. Pt wants to know because at the time she could not receive mmr booster due to pregnancy but wants to know if she should get it because of outbreak?  Please advise

## 2025-04-04 ENCOUNTER — OFFICE VISIT (OUTPATIENT)
Age: 35
End: 2025-04-04
Payer: COMMERCIAL

## 2025-04-04 VITALS
WEIGHT: 198.6 LBS | HEIGHT: 69 IN | SYSTOLIC BLOOD PRESSURE: 100 MMHG | RESPIRATION RATE: 16 BRPM | DIASTOLIC BLOOD PRESSURE: 60 MMHG | HEART RATE: 81 BPM | BODY MASS INDEX: 29.41 KG/M2 | TEMPERATURE: 98.6 F

## 2025-04-04 DIAGNOSIS — K75.81 NASH (NONALCOHOLIC STEATOHEPATITIS): ICD-10-CM

## 2025-04-04 DIAGNOSIS — R73.03 PREDIABETES: ICD-10-CM

## 2025-04-04 DIAGNOSIS — I10 HYPERTENSION, UNSPECIFIED TYPE: ICD-10-CM

## 2025-04-04 DIAGNOSIS — E66.811 OBESITY, CLASS I, BMI 30-34.9: ICD-10-CM

## 2025-04-04 DIAGNOSIS — E66.811 OBESITY, CLASS I, BMI 30-34.9: Primary | ICD-10-CM

## 2025-04-04 PROCEDURE — 99214 OFFICE O/P EST MOD 30 MIN: CPT | Performed by: PHYSICIAN ASSISTANT

## 2025-04-04 RX ORDER — TIRZEPATIDE 7.5 MG/.5ML
7.5 INJECTION, SOLUTION SUBCUTANEOUS WEEKLY
Qty: 2 ML | Refills: 2 | Status: SHIPPED | OUTPATIENT
Start: 2025-04-04 | End: 2025-04-04 | Stop reason: SDUPTHER

## 2025-04-04 RX ORDER — TIRZEPATIDE 7.5 MG/.5ML
7.5 INJECTION, SOLUTION SUBCUTANEOUS WEEKLY
Qty: 2 ML | Refills: 2 | Status: SHIPPED | OUTPATIENT
Start: 2025-04-04 | End: 2025-06-27

## 2025-04-04 NOTE — PATIENT INSTRUCTIONS
- Side effects of Zepbound include nausea, vomiting, diarrhea, or constipation. Keep an eye on your heart rate while on Zepbound. If you resting heart rate is greater than 100 beats per minutes, please notify me. If you develop severe abdominal pain, stop Zepbound and go to the emergency room, as that could be a sign of pancreatitis.     - Please notify me if you have surgery, upper endoscopy, or colonoscopy scheduled, as we typically hold Zepbound for one week prior to the procedure.     - Zepbound can reduce the effectiveness of oral hormonal birth control (birth control pills). Recommend a barrier backup method such as condoms to prevent pregnancy.       GLP-1 Managing Side Effects Tips:  - focus on small frequent meals  - moderate sugar and fat intake  - change the injection site (abdomen --> thigh--> back of arm)  - eat protein, crackers, mints and radha-based drinks  - nighttime injections  - drink plenty of water  - consider fiber supplements like miralax    Tips for Nausea:  - Don't drink and eat simultaneously: separate fluids 30-60 minutes before and after meals when experiencing nausea or if you notice you become full quickly  - Choose mild smelling foods- strong smells can exacerbate nausea  - Radha and peppermint- consider drinking radha or peppermint tea, which can help alleviate symptoms  - Eat- don't skip meals, if you have nausea, it is understandable that you may not feel like eating. However, skipping meals is generally not recommended as this can lead to lower blood sugar and fatigue. Furthermore, it is essential to consume adequate protein during weight loss. You can opt for a protein shake, a meal replacement supplement, bone broth or soup.     Tips for Constipation:   - Drink plenty of water  - Eat food with a high fiber content: increase your fiber intake by consuming these types of foods:   Eat more whole grain products like barley, oats, farro, brown or wild rice and quinoa.    Look for  choices with 100% whole wheat, rye, oats or bran as the first ingredient listed    Check nutrition fact labels and choose products with 4gm of dietary fiber or more per serving   Add more beans to your diet   Eat more fresh fruits and vegetables with skins on them    Exercise- increase physical activity as it helps stimulate gastric mobility, which moves stool through the digestive tract

## 2025-04-04 NOTE — PROGRESS NOTES
Assessment/Plan:  Kayleigh was seen today for follow-up.    Diagnoses and all orders for this visit:    Obesity, Class I, BMI 30-34.9    Hypertension, unspecified type    RIVER (nonalcoholic steatohepatitis)    Prediabetes    BMI 31.0-31.9,adult          - Weight not at goal  - Patient is interested in Conservative Program  - Labs reviewed: As below.    General Recommendations:  Nutrition:  Eat breakfast daily.  Do not skip meals.     Food log (ie.) www.myfitnesspal.com, sparkpeople.com, loseit.com, calorieking.com, etc.    Practice mindful eating.  Be sure to set aside time to eat, eat slowly, and savor your food.    Hydration:    At least 64oz of water daily.  No sugar sweetened beverages.  No juice (eat the fruit instead).    Exercise:  Studies have shown that the ideal exercise goal is somewhere between 150 to 300 minutes of moderate intensity exercise a week.  Start with exercising 10 minutes every other day and gradually increase physical activity with a goal of at least 150 minutes of moderate intensity exercise a week, divided over at least 3 days a week.  An example of this would be exercising 30 minutes a day, 5 days a week.  Resistance training can increase muscle mass and increase our resting metabolic rate.   FULL BODY resistance training is recommended 2-3 times a week.  Do not do this on consecutive days to allow for muscle recovery.    Aim for a bare minimum 5000 steps, even on days you do not exercise.    Monitoring:   Weigh yourself daily.  If this causes undue stress, then just weigh yourself once a week.  Weigh yourself the same time of the day with the same amount of clothing on.  Preferably this should be done after waking up, before you eat, and with no clothing or minimal clothing on.    Specific Goals:  Goal protein intake of 60-80 grams per day  Gradually increase physical activity to a goal of 5 days per week for 30 minutes of MODERATE intensity PLUS 2 days per week of FULL BODY resistance  training  No sugary beverages. At least 64oz of water daily.    Calorie goal:  6161-6290 percy/day    Return visit:    Zepbound 7.5mg once weekly       - Side effects of Zepbound include nausea, vomiting, diarrhea, or constipation. Keep an eye on your heart rate while on Zepbound. If you resting heart rate is greater than 100 beats per minutes, please notify me. If you develop severe abdominal pain, stop Zepbound and go to the emergency room, as that could be a sign of pancreatitis.     - Please notify me if you have surgery, upper endoscopy, or colonoscopy scheduled, as we typically hold Zepbound for one week prior to the procedure.     - Zepbound can reduce the effectiveness of oral hormonal birth control (birth control pills). Recommend a barrier backup method such as condoms to prevent pregnancy.      Referral to meal planning with dietitians   Discussed monitoring BP closely at home; will cut down to 5mg of Lisinopril for now and if feeling dizzy or BP consistently <110/60 patient will hold BP medication.   Calorie tracking/calorie deficit  Physical activity - as tolerated with her knee arthralgia  AOMl  Contraception: condoms. Patient will take a pregnancy test prior to starting pharmacotherapy.   Not a candidate for phentermine d/t hx of SVT  Patient interested in restarting GLP1 RA  - Patient denies personal history of pancreatitis. Patient also denies personal and family history of thyroid cancer and multiple endocrine neoplasia type 2 (MEN 2 tumor).   RTC in 3 months   Nutrition RX:  - start tracking intake  - focus on protein- goal is 30 grams per meal; 90 grams per day  - focus on increasing fiber first by increasing vegetable servings per day  - do not skip breakfast and goal water intake 64 oz daily    Physical Activity RX:  - Goal is 150-200 mins of activity weekly.  Try to include at least 2 strength training sessions; increasing lean body mass will really help you to lose weight and maintain weight  loss.              Total time spent reviewing chart, interviewing patient, examining patient, discussing plan, answering all questions, and documentin min.       ______________________________________________________________________        Subjective:   Chief Complaint   Patient presents with    Follow-up     MWM- 2 mo F/u; Waist-36.5in       HPI: Zina Hdz  is a 35 y.o. female with history of SVT, HTN, RIVER, depression, insomnia, and excess weight, here to pursue weight loss management.  Previous notes and records have been reviewed.    Patient has been on Zepbound 5mg x 1 month; no reported side effects. Feels hunger is coming back a little.     Patient has been doing well and no reported side effects.  She is trying to take the boys and dogs out walking more.     Physical Activity:  Started going back to the gym; 30 mins of cardio  Strength training 1x per week   Joined [facility] gym as well and is doing this 1x per week as well    Food Recall:  B- overnight oats (tends to skip) or protein smoothie, yogurt and granola   L- PB&J sandwich or tuna packet, yogurt and more veggies   D- cooks at home, chicken and vegetable, chili, steak and vegetable     Prediabetes - hemoglobin A1c 5.7 (3/27/24)  TSH WNL    HTN - lisinopril  RIVER - follows with hepatology. Chronically elevated LFTs  Depression -   celexa    Previously took wegovy. In 2023 she had been late a few weeks and restarted a higher dose. Those ultimately led to a hospitalization for uncontrolled nausea and vomiting. Lipase was WNL and no radiographic findings of pancreatitis on CT.  Other than that episode, she felt she tolerated the medicine well and was having good weight loss results. She is interested in restarting.     Decreased physical activity with left knee arthralgia. + RA    Dietary - trying to minimize starches. Portion control     Currently not using contraception - had IUD removed months ago. Has not had return of menstrual cycle.   Patient  will commit to condoms and discuss with OBGYN further options.     HPI  Wt Readings from Last 20 Encounters:   04/04/25 90.1 kg (198 lb 9.6 oz)   02/07/25 97 kg (213 lb 12.8 oz)   01/08/25 97.4 kg (214 lb 12.8 oz)   10/02/24 93.4 kg (205 lb 12.8 oz)   08/08/24 99.8 kg (220 lb)   07/23/24 104 kg (229 lb 6.4 oz)   07/22/24 103 kg (228 lb)   05/29/24 104 kg (229 lb 6.4 oz)   05/16/24 104 kg (230 lb 3.2 oz)   04/16/24 105 kg (231 lb 6 oz)   03/13/24 108 kg (239 lb)   08/30/23 98.9 kg (218 lb)   05/23/23 101 kg (222 lb)   04/26/23 104 kg (229 lb)   04/25/23 104 kg (229 lb)   04/17/23 105 kg (231 lb)   03/06/23 105 kg (232 lb)   02/09/23 103 kg (227 lb 9.6 oz)   01/30/23 103 kg (226 lb 6.4 oz)   12/23/22 101 kg (223 lb)     Excess Weight:  Onset:   Since first pregnancy in 2013   Highest weight: 240/250 (2022)  Current weight: 230  What has been tried: Diet and Exercise and Commercial Weight Loss Programs-ie. Weight Watchers, [employer] [place], Nutrisystem, etc.        Food logging:  B:  skips or yogurt  S: skip  L: sandwich, salads at cafeteria    S: skips  D: tacos, salads, protein (chicken, steak, salmon), veggies (broccoli, carrots) + starch (corn, rice)  S: skips      Dining out: 1-2x  Hydration: Water - 80+ oz    Coffee 1 cup, creamer    Rare soda  Alcohol: Socially. 0-1 drink/month  Smoking: none  Exercise: None, restricted due to arthralgia  Occupation: MA at [employer]      Past Medical History:   Diagnosis Date    Allergic     Anxiety     Depression     Hypertension     SVT (supraventricular tachycardia) (HCC)      Patient denies personal and family history of  pancreatitis, thyroid cancer, MEN-2 tumors.  Denies any hx of glaucoma, seizures, kidney stones, gallstones.  + hx of SVT  Denies uncontrolled anxiety or depression, suicidal behavior or thinking , insomnia or sleep disturbance.     Past Surgical History:   Procedure Laterality Date    CARDIAC ELECTROPHYSIOLOGY PROCEDURE N/A 1/30/2023     "Procedure: Cardiac eps/svt ablation;  Surgeon: José Loving DO;  Location: [facility];  Service: Cardiology    IR BIOPSY LIVER RANDOM  2/9/2023     The following portions of the patient's history were reviewed and updated as appropriate: allergies, current medications, past family history, past medical history, past social history, past surgical history, and problem list.    Review Of Systems:  Review of Systems   Constitutional:  Negative for fatigue and fever.   HENT:  Negative for sore throat, trouble swallowing and voice change.    Respiratory:  Negative for shortness of breath.    Cardiovascular:  Negative for chest pain.   Gastrointestinal:  Positive for constipation (every other day). Negative for abdominal pain, diarrhea, nausea and vomiting.   Endocrine: Negative for cold intolerance and heat intolerance.   Genitourinary:  Negative for difficulty urinating.   Musculoskeletal:  Negative for arthralgias and back pain.   Psychiatric/Behavioral:  Negative for suicidal ideas. The patient is not nervous/anxious.    All other systems reviewed and are negative.      Objective:  /60 (BP Location: Left arm, Patient Position: Sitting)   Pulse 81   Temp 98.6 °F (37 °C) (Tympanic)   Resp 16   Ht 5' 9\" (1.753 m)   Wt 90.1 kg (198 lb 9.6 oz)   BMI 29.33 kg/m²   Physical Exam    Labs and Imaging  Recent labs and imaging have been personally reviewed.  Lab Results   Component Value Date    WBC 13.08 (H) 12/31/2024    HGB 14.4 12/31/2024    HCT 43.5 12/31/2024    MCV 89 12/31/2024     12/31/2024     Lab Results   Component Value Date    SODIUM 137 12/31/2024    K 4.5 12/31/2024     12/31/2024    CO2 24 12/31/2024    AGAP 8 12/31/2024    BUN 12 12/31/2024    CREATININE 0.47 (L) 12/31/2024    GLUC 85 10/28/2024    GLUF 94 12/31/2024    CALCIUM 9.4 12/31/2024    AST 32 12/31/2024    ALT 44 12/31/2024    ALKPHOS 60 12/31/2024    TP 7.5 12/31/2024    TBILI 0.49 12/31/2024    EGFR 131 12/31/2024 "     Lab Results   Component Value Date    HGBA1C 5.7 (H) 03/27/2024     Lab Results   Component Value Date    OJS1WIBXHGUH 2.761 12/31/2024    TSH 1.53 04/09/2022     Lab Results   Component Value Date    CHOLESTEROL 155 03/27/2024     Lab Results   Component Value Date    HDL 25 (L) 03/27/2024     Lab Results   Component Value Date    TRIG 155 (H) 03/27/2024     Lab Results   Component Value Date    LDLCALC 99 03/27/2024

## 2025-04-15 ENCOUNTER — PATIENT MESSAGE (OUTPATIENT)
Dept: GASTROENTEROLOGY | Facility: CLINIC | Age: 35
End: 2025-04-15

## 2025-04-15 DIAGNOSIS — K75.81 NASH (NONALCOHOLIC STEATOHEPATITIS): ICD-10-CM

## 2025-04-15 DIAGNOSIS — R79.89 ELEVATED LFTS: Primary | ICD-10-CM

## 2025-04-22 DIAGNOSIS — F41.9 ANXIETY: ICD-10-CM

## 2025-04-23 ENCOUNTER — APPOINTMENT (OUTPATIENT)
Dept: LAB | Facility: CLINIC | Age: 35
End: 2025-04-23
Attending: INTERNAL MEDICINE
Payer: COMMERCIAL

## 2025-04-23 DIAGNOSIS — K75.81 NASH (NONALCOHOLIC STEATOHEPATITIS): ICD-10-CM

## 2025-04-23 DIAGNOSIS — R79.89 ELEVATED LFTS: ICD-10-CM

## 2025-04-23 LAB
ALBUMIN SERPL BCG-MCNC: 4.3 G/DL (ref 3.5–5)
ALP SERPL-CCNC: 79 U/L (ref 34–104)
ALT SERPL W P-5'-P-CCNC: 24 U/L (ref 7–52)
ANION GAP SERPL CALCULATED.3IONS-SCNC: 9 MMOL/L (ref 4–13)
AST SERPL W P-5'-P-CCNC: 19 U/L (ref 13–39)
BILIRUB SERPL-MCNC: 0.47 MG/DL (ref 0.2–1)
BUN SERPL-MCNC: 7 MG/DL (ref 5–25)
CALCIUM SERPL-MCNC: 9.4 MG/DL (ref 8.4–10.2)
CHLORIDE SERPL-SCNC: 104 MMOL/L (ref 96–108)
CO2 SERPL-SCNC: 25 MMOL/L (ref 21–32)
CREAT SERPL-MCNC: 0.61 MG/DL (ref 0.6–1.3)
GFR SERPL CREATININE-BSD FRML MDRD: 119 ML/MIN/1.73SQ M
GLUCOSE P FAST SERPL-MCNC: 101 MG/DL (ref 65–99)
POTASSIUM SERPL-SCNC: 3.6 MMOL/L (ref 3.5–5.3)
PROT SERPL-MCNC: 7.4 G/DL (ref 6.4–8.4)
SODIUM SERPL-SCNC: 138 MMOL/L (ref 135–147)

## 2025-04-23 PROCEDURE — 80053 COMPREHEN METABOLIC PANEL: CPT

## 2025-04-23 PROCEDURE — 36415 COLL VENOUS BLD VENIPUNCTURE: CPT

## 2025-04-23 RX ORDER — CITALOPRAM HYDROBROMIDE 20 MG/1
20 TABLET ORAL DAILY
Qty: 90 TABLET | Refills: 1 | Status: SHIPPED | OUTPATIENT
Start: 2025-04-23

## 2025-04-24 ENCOUNTER — OFFICE VISIT (OUTPATIENT)
Age: 35
End: 2025-04-24
Payer: COMMERCIAL

## 2025-04-24 VITALS
HEIGHT: 69 IN | BODY MASS INDEX: 28.94 KG/M2 | WEIGHT: 195.4 LBS | DIASTOLIC BLOOD PRESSURE: 78 MMHG | SYSTOLIC BLOOD PRESSURE: 100 MMHG | HEART RATE: 64 BPM

## 2025-04-24 DIAGNOSIS — Z80.0 FAMILY HISTORY OF COLON CANCER IN FATHER: ICD-10-CM

## 2025-04-24 DIAGNOSIS — K75.81 METABOLIC DYSFUNCTION-ASSOCIATED STEATOHEPATITIS (MASH): Primary | ICD-10-CM

## 2025-04-24 PROCEDURE — 99213 OFFICE O/P EST LOW 20 MIN: CPT | Performed by: INTERNAL MEDICINE

## 2025-04-24 NOTE — ASSESSMENT & PLAN NOTE
- Significant weight loss from 229 pounds to 195 pounds.  - Liver enzymes normalized: AST 19, ALT 24 (04/23/2025).  - Last fibrosis testing (12/12/2022) showed minimal fibrosis, significant fat.  - Liver Biopsy (02/2023) revealed minimal fibrosis, some activity.  - Physical examination: no signs of cirrhosis or portal hypertension.  - Premature to calculate FIB-4 score due to age.  - Continue current regimen, maintain regular follow-ups with primary care physician.  - Optimize cardiometabolic risk factors.  - Expect improvements in fatty liver disease with sustained weight loss >10% for > year.  - Annual noninvasive tests if desired, including FIB-4 (after age 35), ELF or Elastography.

## 2025-04-24 NOTE — PROGRESS NOTES
Name: Zina Hdz      : 1990      MRN: [id number]  Encounter Provider: Josef Lieberman MD  Encounter Date: 2025   Encounter department: [facility] [address] [address]  :  Assessment & Plan  Metabolic dysfunction-associated steatohepatitis (MASH)  - Significant weight loss from 229 pounds to 195 pounds.  - Liver enzymes normalized: AST 19, ALT 24 (2025).  - Last fibrosis testing (2022) showed minimal fibrosis, significant fat.  - Liver Biopsy (2023) revealed minimal fibrosis, some activity.  - Physical examination: no signs of cirrhosis or portal hypertension.  - Premature to calculate FIB-4 score due to age.  - Continue current regimen, maintain regular follow-ups with primary care physician.  - Optimize cardiometabolic risk factors.  - Expect improvements in fatty liver disease with sustained weight loss >10% for > year.  - Annual noninvasive tests if desired, including FIB-4 (after age 35), ELF or Elastography.       Family history of colon cancer in father  - Repeat colonoscopy recommended in , five years from last one in .           Assessment & Plan  1. Metabolic associated steatotic liver disease:  - Significant weight loss from 229 pounds to 195 pounds.  - Liver enzymes normalized: AST 19, ALT 24 (2025).  - Last fibrosis testing (2022) showed minimal fibrosis, significant fat.  - Liver Biopsy (2023) revealed minimal fibrosis, some activity.  - Physical examination: no signs of cirrhosis or portal hypertension.  - Premature to calculate FIB-4 score due to age.  - Continue current regimen, maintain regular follow-ups with primary care physician.  - Optimize cardiometabolic risk factors.  - Expect improvements in fatty liver disease with sustained weight loss >10% for > year.  - Annual noninvasive tests if desired, including FIB-4 (after age 35), ELF or Elastography.    2. Family history of colon cancer:  - Repeat colonoscopy recommended in  2028, five years from last one in 2023.    3. Weight management:  - Switched from Wegovy to Zepbound 7.5 mg since mid-December due to severe nausea and vomiting with Wegovy.  - Tolerating Zepbound well with mild nausea on the first day after the dose.  - Continue follow-up with weight management for Zepbound prescription.    4. Tension headaches:  - Pressure in the back of the head, tightness in neck and shoulders, likely related to posture and work-from-home conditions.  - Discuss symptoms with primary care physician for further evaluation and management.    5. Constipation:  - Likely due to high-protein diet.  - Use MiraLAX mixed with drinks.  - Take senna or Colace at night if no bowel movement.    6. Shortness of breath:  - Difficulty taking deep breaths, feels winded at rest, not during exercise.  - Upcoming appointment with primary care physician.  History of Present Illness       History of Present Illness  The patient is a 35-year-old female who presents for follow-up of metabolic associated steatotic liver disease, weight management, and tension headaches.    She has been on Zepbound 7.5 mg for the past 3 months, which is well tolerated with no major side effects. Mild nausea is experienced on the first day post-dose, but vomiting does not occur. A high-protein diet is being adhered to, and consultations with a dietitian have been beneficial. Occasional abdominal pain is reported, but there is no alcohol consumption. Significant weight loss has been achieved, and liver enzymes have normalized. Recent labs from 04/23/2025 showed an AST of 19 and ALT of 24. An ultrasound was performed in 10/2024. There are no reports of leg swelling, jaundice, excessive bruising or bleeding, or confusion. Wegovy was previously used but discontinued due to severe nausea and vomiting, which led to hospitalization for dehydration.    Interest in undergoing another colonoscopy is expressed due to the father's history of colon  cancer, despite being asymptomatic. The last colonoscopy was performed in 10/2023.    Headaches, lightheadedness, and dizziness have been experienced, along with unusual pressure in the head, attributed to poor posture from working at home. Tightness in the neck and shoulders is also reported. These symptoms are managed without medication unless they become severe.    Recently, she has joined mixed martial arts ([employer]) and reports joint and back pain related to this activity.    Difficulty in taking deep breaths and feeling winded at rest, but not during exercise, has been noticed. An upcoming appointment with the primary care physician is scheduled.    Regular bowel movements are maintained with daily Colace and Dulcolax. There is no blood in the stool.    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  Exercise: Participates in mixed martial arts ([employer]).  Diet: Eats a lot of protein and follows guidance from a dietitian.  Alcohol: Does not drink alcohol.  Tobacco: Does not smoke.      FAMILY HISTORY  - Father: Colon cancer, diagnosis date unknown.    History obtained from: patient    Review of Systems A complete review of systems is negative other than that noted above in the HPI.    Social History     Tobacco Use    Smoking status: Never     Passive exposure: Never    Smokeless tobacco: Never   Vaping Use    Vaping status: Never Used   Substance and Sexual Activity    Alcohol use: Yes     Alcohol/week: 2.0 standard drinks of alcohol     Types: 1 Glasses of wine, 1 Cans of beer per week     Comment: Socially    Drug use: Never    Sexual activity: Yes     Partners: Male     Birth control/protection: None       Patient Active Problem List   Diagnosis    Palpitations    Hypertension    Anxiety    Seasonal allergic rhinitis    Menorrhagia    ALINE positive    Other fatigue    Flushing    Irregular menses    Depression    Insomnia    SVT (supraventricular tachycardia) (HCC)    Leg cramps    Class 1 obesity due to  excess calories without serious comorbidity with body mass index (BMI) of 33.0 to 33.9 in adult    Class 1 obesity due to excess calories without serious comorbidity with body mass index (BMI) of 32.0 to 32.9 in adult    Abdominal pain with vomiting    Metabolic dysfunction-associated steatohepatitis (MASH)    Epigastric pain    PND (post-nasal drip)    Hot flashes    Night sweats    Acute nonintractable headache    Class 2 severe obesity due to excess calories with serious comorbidity and body mass index (BMI) of 35.0 to 35.9 in adult (HCC)    Fungal dermatitis    Hyperglycemia    Neck pain    Pain in joint, multiple sites    Family history of colon cancer in father       Current Outpatient Medications   Medication Sig Dispense Refill    citalopram (CeleXA) 20 mg tablet Take 1 tablet (20 mg total) by mouth daily 90 tablet 1    meclizine (ANTIVERT) 25 mg tablet Take 1 tablet (25 mg total) by mouth 3 (three) times a day as needed for dizziness 30 tablet 0    metoclopramide (REGLAN) 5 mg tablet Take 1 tablet (5 mg total) by mouth 3 (three) times a day as needed (nasuea) 30 tablet 0    montelukast (SINGULAIR) 10 mg tablet Take 1 tablet (10 mg total) by mouth daily at bedtime 90 tablet 1    tirzepatide (Zepbound) 7.5 mg/0.5 mL auto-injector Inject 0.5 mL (7.5 mg total) under the skin once a week 2 mL 2    clotrimazole-betamethasone (LOTRISONE) 1-0.05 % cream Apply topically 2 (two) times a day (Patient taking differently: Apply topically if needed) 45 g 0    famotidine (PEPCID) 20 mg tablet Take 1 tablet (20 mg total) by mouth 2 (two) times a day for 12 days (Patient taking differently: Take 20 mg by mouth if needed) 24 tablet 0    ketorolac (TORADOL) 10 mg tablet Take 1 tablet (10 mg total) by mouth every 6 (six) hours as needed for moderate pain 30 tablet 0    lisinopril (ZESTRIL) 10 mg tablet Take 1 tablet (10 mg total) by mouth daily (Patient not taking: Reported on 4/24/2025) 90 tablet 1    medroxyPROGESTERone  "(PROVERA) 10 mg tablet Take 1 tablet (10 mg total) by mouth daily for 10 days (Patient not taking: Reported on 2/7/2025) 10 tablet 0    nystatin (MYCOSTATIN) powder Apply topically 3 (three) times a day (Patient taking differently: Apply topically if needed) 30 g 1    ondansetron (ZOFRAN) 4 mg tablet Take 1 tablet (4 mg total) by mouth every 8 (eight) hours as needed for nausea or vomiting for up to 10 days 20 tablet 0    tiZANidine (ZANAFLEX) 2 mg tablet       traZODone (DESYREL) 50 mg tablet Take 1 tablet (50 mg total) by mouth daily at bedtime as needed for sleep 30 tablet 0     No current facility-administered medications for this visit.       Objective   /78   Pulse 64   Ht 5' 9\" (1.753 m)   Wt 88.6 kg (195 lb 6.4 oz)   BMI 28.86 kg/m²       Physical Exam   Physical Exam  HEENT: No icteric sclera.  Neck: No enlarged glands.  Abdomen: Soft and nondistended. No hepatomegaly or splenomegaly.  Skin: No signs of cirrhosis or portal hypertension. Nails look normal. No palmar erythema.    Results  Labs   - ALT: 04/23/2025, 24 U/L   - AST: 04/23/2025, 19 U/L    Diagnostic Testing   - Liver Biopsy: 02/2023, Minimal fibrosis, brunt grade 1 stage I    Lab Results: I personally reviewed relevant lab results.    Score cannot be calculated. In patient's <= 35 years old, there is poor performance in regards to estimating degree of fibrosis.            Results for orders placed during the hospital encounter of 04/28/23    Colonoscopy    Impression  Few scattered small diverticula in the ascending, transverse, and descending colon.    RECOMMENDATION:  Follow up with PCP  Start CRC screening at age 45, or per national society guidlines if they should change prior to that time.    High fiber diet.  A high fiber diet with plenty of fluids (up to 8 glasses of water daily) is suggested.  Metamucil, 1 tablespoon once or twice daily can be used to keep bowels regular if needed.          Josef Lieberman MD        "

## 2025-05-29 ENCOUNTER — OFFICE VISIT (OUTPATIENT)
Dept: FAMILY MEDICINE CLINIC | Facility: CLINIC | Age: 35
End: 2025-05-29
Payer: COMMERCIAL

## 2025-05-29 VITALS
RESPIRATION RATE: 16 BRPM | OXYGEN SATURATION: 99 % | HEART RATE: 93 BPM | BODY MASS INDEX: 28.58 KG/M2 | WEIGHT: 193 LBS | SYSTOLIC BLOOD PRESSURE: 126 MMHG | HEIGHT: 69 IN | TEMPERATURE: 97.4 F | DIASTOLIC BLOOD PRESSURE: 72 MMHG

## 2025-05-29 DIAGNOSIS — Z13.1 DIABETES MELLITUS SCREENING: ICD-10-CM

## 2025-05-29 DIAGNOSIS — I10 HYPERTENSION, UNSPECIFIED TYPE: ICD-10-CM

## 2025-05-29 DIAGNOSIS — Z00.00 HEALTHCARE MAINTENANCE: Primary | ICD-10-CM

## 2025-05-29 DIAGNOSIS — F41.9 ANXIETY: ICD-10-CM

## 2025-05-29 DIAGNOSIS — F32.A DEPRESSION, UNSPECIFIED DEPRESSION TYPE: ICD-10-CM

## 2025-05-29 PROCEDURE — 99395 PREV VISIT EST AGE 18-39: CPT | Performed by: NURSE PRACTITIONER

## 2025-05-29 NOTE — PROGRESS NOTES
Name: Zina Hdz      : 1990      MRN: [id number]  Encounter Provider: MONTY Horowitz  Encounter Date: 2025   Encounter department: [facility] [address] [facility]  :  Assessment & Plan  Healthcare maintenance  - Reviewed immunizations and screenings.  - UTD with dental, vision, and GYN exams.   - Routine labs ordered.   Orders:  •  CBC and differential; Future  •  Comprehensive metabolic panel; Future  •  Lipid Panel with Direct LDL reflex; Future  •  TSH, 3rd generation with Free T4 reflex; Future    Hypertension, unspecified type  - Well controlled without medication.   - Will continue to monitor.   Orders:  •  CBC and differential; Future  •  Comprehensive metabolic panel; Future  •  Lipid Panel with Direct LDL reflex; Future  •  TSH, 3rd generation with Free T4 reflex; Future    Depression, unspecified depression type  Depression Screening Follow-up Plan: Patient's depression screening was positive with a PHQ-9 score of 6. Patient with underlying depression and was advised to continue current medications as prescribed.    - Stable on Celexa 20 mg daily. Continue same.   - Will continue to monitor.               Anxiety  - Stable on Celexa 20 mg daily. Continue same.   - Will continue to monitor.           Diabetes mellitus screening    Orders:  •  Hemoglobin A1C; Future           History of Present Illness   Patient with PMH of HTN, SVT, RIVER, anxiety, and depression presents to office today for follow-up.  She is taking her prescribed medications and reports no side effects.  She is taking Zepbound prescribed by Weight Management. She is doing well and tolerating the medication without significant side effects. She is due for updated blood work. She denies any concerns or complaints today.          Review of Systems   Constitutional:  Negative for fatigue and fever.   HENT:  Negative for congestion, postnasal drip and trouble swallowing.    Eyes:  Negative for visual disturbance.  "  Respiratory:  Negative for cough and shortness of breath.    Cardiovascular:  Negative for chest pain and palpitations.   Gastrointestinal:  Negative for abdominal pain and blood in stool.   Endocrine: Negative for cold intolerance and heat intolerance.   Genitourinary:  Negative for difficulty urinating and dysuria.   Musculoskeletal:  Negative for gait problem.   Skin:  Negative for rash.   Neurological:  Negative for dizziness, syncope and headaches.   Hematological:  Negative for adenopathy.   Psychiatric/Behavioral:  Negative for behavioral problems.        Objective   /72 (BP Location: Left arm, Patient Position: Sitting, Cuff Size: Large)   Pulse 93   Temp (!) 97.4 °F (36.3 °C) (Tympanic)   Resp 16   Ht 5' 9\" (1.753 m)   Wt 87.5 kg (193 lb)   LMP 05/13/2025   SpO2 99%   BMI 28.50 kg/m²      Physical Exam  Vitals and nursing note reviewed.   Constitutional:       Appearance: Normal appearance. She is well-developed.   HENT:      Head: Normocephalic and atraumatic.      Right Ear: Tympanic membrane, ear canal and external ear normal.      Left Ear: Tympanic membrane, ear canal and external ear normal.      Nose: Nose normal.      Mouth/Throat:      Mouth: Mucous membranes are moist.      Pharynx: No posterior oropharyngeal erythema.     Eyes:      Conjunctiva/sclera: Conjunctivae normal.      Pupils: Pupils are equal, round, and reactive to light.       Cardiovascular:      Rate and Rhythm: Normal rate and regular rhythm.      Heart sounds: Normal heart sounds.   Pulmonary:      Effort: Pulmonary effort is normal.      Breath sounds: Normal breath sounds.   Abdominal:      General: Bowel sounds are normal.      Palpations: Abdomen is soft.     Musculoskeletal:         General: Normal range of motion.      Cervical back: Normal range of motion.   Lymphadenopathy:      Cervical: No cervical adenopathy.     Skin:     General: Skin is warm and dry.     Neurological:      Mental Status: She is alert " and oriented to person, place, and time.     Psychiatric:         Mood and Affect: Mood normal.         Behavior: Behavior normal.         Answers submitted by the patient for this visit:  Annual Physical (Submitted on 5/28/2025)  Diet/Nutrition choices: no special diet  Exercise choices: moderate cardiovascular exercise, strength training exercises, 1-2 times a week on average  Sleep choices: sleeps poorly, 4-6 hours of sleep on average, unrefreshing sleep  Hearing choices: normal hearing bilateral ears  Vision choices: most recent eye exam < 1 year ago, wears glasses and contacts  Dental choices: regular dental visits, brushes teeth twice daily, floss regularly  Do you currently have an OB/GYN provider that you routinely follow with?: Yes  Menopausal status: premenopausal  Last menstrual cycle (if applicable):: 5/13/2025  Any history of sexual transmitted disease/infection?: No  Do you have an advance directive/living will?: No  Do you have a durable power of  (POA)?: No

## 2025-05-29 NOTE — ASSESSMENT & PLAN NOTE
Depression Screening Follow-up Plan: Patient's depression screening was positive with a PHQ-9 score of 6. Patient with underlying depression and was advised to continue current medications as prescribed.    - Stable on Celexa 20 mg daily. Continue same.   - Will continue to monitor.

## 2025-05-29 NOTE — ASSESSMENT & PLAN NOTE
- Well controlled without medication.   - Will continue to monitor.   Orders:  •  CBC and differential; Future  •  Comprehensive metabolic panel; Future  •  Lipid Panel with Direct LDL reflex; Future  •  TSH, 3rd generation with Free T4 reflex; Future

## 2025-05-29 NOTE — ASSESSMENT & PLAN NOTE
- Reviewed immunizations and screenings.  - UTD with dental, vision, and GYN exams.   - Routine labs ordered.   Orders:  •  CBC and differential; Future  •  Comprehensive metabolic panel; Future  •  Lipid Panel with Direct LDL reflex; Future  •  TSH, 3rd generation with Free T4 reflex; Future

## 2025-06-12 DIAGNOSIS — K75.81 NASH (NONALCOHOLIC STEATOHEPATITIS): ICD-10-CM

## 2025-06-12 DIAGNOSIS — R73.03 PREDIABETES: ICD-10-CM

## 2025-06-12 DIAGNOSIS — I10 HYPERTENSION, UNSPECIFIED TYPE: ICD-10-CM

## 2025-06-12 DIAGNOSIS — E66.811 OBESITY, CLASS I, BMI 30-34.9: ICD-10-CM

## 2025-06-12 RX ORDER — TIRZEPATIDE 7.5 MG/.5ML
7.5 INJECTION, SOLUTION SUBCUTANEOUS WEEKLY
Qty: 2 ML | Refills: 2 | Status: SHIPPED | OUTPATIENT
Start: 2025-06-12 | End: 2025-09-04

## 2025-06-17 DIAGNOSIS — F41.9 ANXIETY: ICD-10-CM

## 2025-06-17 DIAGNOSIS — J30.2 SEASONAL ALLERGIC RHINITIS, UNSPECIFIED TRIGGER: ICD-10-CM

## 2025-06-17 RX ORDER — CITALOPRAM HYDROBROMIDE 20 MG/1
20 TABLET ORAL DAILY
Qty: 90 TABLET | Refills: 1 | Status: SHIPPED | OUTPATIENT
Start: 2025-06-17

## 2025-06-17 RX ORDER — MONTELUKAST SODIUM 10 MG/1
10 TABLET ORAL
Qty: 90 TABLET | Refills: 1 | Status: SHIPPED | OUTPATIENT
Start: 2025-06-17 | End: 2025-12-14

## 2025-06-28 PROBLEM — Z00.00 HEALTHCARE MAINTENANCE: Status: RESOLVED | Noted: 2025-05-29 | Resolved: 2025-06-28

## 2025-07-07 ENCOUNTER — TELEPHONE (OUTPATIENT)
Age: 35
End: 2025-07-07

## 2025-07-07 ENCOUNTER — NURSE TRIAGE (OUTPATIENT)
Age: 35
End: 2025-07-07

## 2025-07-07 DIAGNOSIS — F41.9 ANXIETY: ICD-10-CM

## 2025-07-07 DIAGNOSIS — F41.9 ANXIETY: Primary | ICD-10-CM

## 2025-07-07 DIAGNOSIS — F32.A DEPRESSION, UNSPECIFIED DEPRESSION TYPE: ICD-10-CM

## 2025-07-07 RX ORDER — CITALOPRAM HYDROBROMIDE 40 MG/1
40 TABLET ORAL DAILY
Qty: 90 TABLET | Refills: 1 | Status: SHIPPED | OUTPATIENT
Start: 2025-07-07

## 2025-07-07 NOTE — TELEPHONE ENCOUNTER
"REASON FOR CONVERSATION: Medication Problem    SYMPTOMS: increased anxiety, depression, stress, thoughts of not being alive(states she will not act on thoughts).     OTHER HEALTH INFORMATION: was referred to psych in the past, never went. Increase in symptoms recently, depending on the day and what is going on symptoms worse than other days.    PROTOCOL DISPOSITION: Discuss With PCP and Callback by Nurse Within 1 Hour    CARE ADVICE PROVIDED: Informed if symptoms worsen, thoughts of harming self or others to go to the ED immediately. Verbalized understanding.     PRACTICE FOLLOW-UP: Asking if medication dosing for the Citalopram could be changed possibly and a referral for psych to set up. Patient will wait to hear back with further instruction. If you can't get in touch of her by phone, a [employer] message can be sent as well.        Reason for Disposition   Caller has URGENT medicine question about med that PCP or specialist prescribed and triager unable to answer question    Answer Assessment - Initial Assessment Questions  1. NAME of MEDICINE: \"What medicine(s) are you calling about?\"      citalopram (CeleXA) 20 mg tablet  2. QUESTION: \"What is your question?\" (e.g., double dose of medicine, side effect)      Asking for increased dose possibly, referral to psych  3. PRESCRIBER: \"Who prescribed the medicine?\" Reason: if prescribed by specialist, call should be referred to that group.      MONTY Wolfe  4. SYMPTOMS: \"Do you have any symptoms?\" If Yes, ask: \"What symptoms are you having?\"  \"How bad are the symptoms (e.g., mild, moderate, severe)      Increased anxiety, depression, stress, sometimes thoughts of not being hear (will never act on thoughts)  5. PREGNANCY:  \"Is there any chance that you are pregnant?\" \"When was your last menstrual period?\"      Didn't ask    Protocols used: Medication Question Call-Adult-[place]    "

## 2025-07-07 NOTE — TELEPHONE ENCOUNTER
We received message from nurse triage. Pt is c/o increased anxiety and depression. She is having thoughts of not being alive but would not act on it. She would like to know if her citalopram could be increased and a ref placed to psych. I did place the ref as requested and added asap on it. The nurse did document that pt verbally understood that if she has any thoughts of self harm to go to ER.  Please advise

## 2025-07-07 NOTE — TELEPHONE ENCOUNTER
Pt is going to increase the dose as directed of the citalopram. She is aware a ref was placed and will get the number to call and be scheduled. She will hold off for about 1 week to see if the medication increase helps for about 1 week and if not she will clal to be seen. Pt agreed to go to ER with any thoughts of suicide.

## 2025-07-07 NOTE — TELEPHONE ENCOUNTER
Patient has been added to the Medication Management and Talk Therapy wait list with a referral.    Insurance: [facility]   Insurance Type:    Commercial [x]   Medicaid []   [facility] (if applicable)   Medicare []  Location Preference: [place] or [place]  Provider Preference: none  Virtual: Yes [x] No []  Were outside resources sent: Yes [] No [x]

## 2025-07-07 NOTE — TELEPHONE ENCOUNTER
I increased citalopram to 40 mg daily.  She can take 2 tablets from what she has until she get the new prescription.  Also if any suicidal or homicidal thoughts she need to go to the emergency room.  If patient desired to come in for an appointment tomorrow please put the patient in the schedule.

## 2025-07-08 ENCOUNTER — TELEPHONE (OUTPATIENT)
Age: 35
End: 2025-07-08

## 2025-07-08 NOTE — TELEPHONE ENCOUNTER
"Behavioral Health Outpatient Intake Questions    Referred By   : ASAP Referral     Please advise interviewee that they need to answer all questions truthfully to allow for best care, and any misrepresentations of information may affect their ability to be seen at this clinic   => Was this discussed? Yes     If Minor Child (under age 18)    Who is/are the legal guardian(s) of the child?     Is there a custody agreement? No     If \"YES\"- Custody orders must be obtained prior to scheduling the first appointment  In addition, Consent to Treatment must be signed by all legal guardians prior to scheduling the first appointment    If \"NO\"- Consent to Treatment must be signed by all legal guardians prior to scheduling the first appointment    Behavioral Health Outpatient Intake History -     Presenting Problem (in patient's own words):     Severe depression. Takes medication but does not feel like it is working.    Are there any communication barriers for this patient?     No                                               If yes, please describe barriers:    If there is a unique situation, please refer to Zeke Betancourt/Wendy Hurtado for final determination.    Are you taking any psychiatric medications? Yes     If \"YES\" -What are they Celexa     If \"YES\" -Who prescribes? PCP    Has the Patient previously received outpatient Talk Therapy or Medication Management from [facility]  No        If \"YES\"- When, Where and with Whom?         If \"NO\" -Has Patient received these services elsewhere?       If \"YES\" -When, Where, and with Whom?    Has the Patient abused alcohol or other substances in the last 6 months ? No  No concerns of substance abuse are reported.     If \"YES\" -What substance, How much, How often?     If illegal substance: Refer to [facility] (for JOSE) or [facility] Off[facility].   If Alcohol in excess of 10 drinks per week:  Refer to [facility] (for JOSE) or [facility] Offices    Legal History-     Is this treatment " "court ordered? No   If \"yes \"send to :  Talk Therapy : Send to Zeke Betancourt for final determination   Med Management: Send to Dr. Melgoza for final determination     Has the Patient been convicted of a felony?  No   If \"Yes\" send to -When, What?  Talk Therapy: Send to Zeke Betancourt for final determination   Med Management: Send to Dr. Melgoza for final determination     ACCEPTED as a patient Yes  If \"Yes\" Appointment Date: 7/16/2025 Therapy &8/14/2025 Meds     Referred Elsewhere?   If “Yes” - (Where? Ex: [facility], [facility], [facility], [facility], etc.)       Name of Insurance Co:[facility]   Insurance ID#[id number]  Insurance Phone #  If ins is primary or secondary?Primary   If patient is a minor, parents information such as Name, D.O.B of guarantor.  "

## 2025-07-10 ENCOUNTER — OFFICE VISIT (OUTPATIENT)
Age: 35
End: 2025-07-10
Payer: COMMERCIAL

## 2025-07-10 VITALS
BODY MASS INDEX: 27.78 KG/M2 | SYSTOLIC BLOOD PRESSURE: 110 MMHG | HEIGHT: 69 IN | WEIGHT: 187.6 LBS | DIASTOLIC BLOOD PRESSURE: 70 MMHG | HEART RATE: 75 BPM | TEMPERATURE: 97.1 F | RESPIRATION RATE: 16 BRPM

## 2025-07-10 DIAGNOSIS — I10 HYPERTENSION, UNSPECIFIED TYPE: ICD-10-CM

## 2025-07-10 DIAGNOSIS — E66.811 OBESITY, CLASS I, BMI 30-34.9: Primary | ICD-10-CM

## 2025-07-10 DIAGNOSIS — R73.03 PREDIABETES: ICD-10-CM

## 2025-07-10 DIAGNOSIS — K75.81 NASH (NONALCOHOLIC STEATOHEPATITIS): ICD-10-CM

## 2025-07-10 PROCEDURE — 99214 OFFICE O/P EST MOD 30 MIN: CPT | Performed by: PHYSICIAN ASSISTANT

## 2025-07-10 RX ORDER — TIRZEPATIDE 10 MG/.5ML
10 INJECTION, SOLUTION SUBCUTANEOUS WEEKLY
Qty: 2 ML | Refills: 3 | Status: SHIPPED | OUTPATIENT
Start: 2025-07-10 | End: 2025-10-30

## 2025-07-10 NOTE — PROGRESS NOTES
Assessment/Plan:  Kayleigh was seen today for follow-up.    Diagnoses and all orders for this visit:    Obesity, Class I, BMI 30-34.9    Hypertension, unspecified type    RIVER (nonalcoholic steatohepatitis)    Prediabetes    BMI 31.0-31.9,adult            - Weight not at goal  - Patient is interested in Conservative Program  - Labs reviewed: As below.    General Recommendations:  Nutrition:  Eat breakfast daily.  Do not skip meals.     Food log (ie.) www.myfitnesspal.com, sparkpeople.com, loseit.com, calorieking.com, etc.    Practice mindful eating.  Be sure to set aside time to eat, eat slowly, and savor your food.    Hydration:    At least 64oz of water daily.  No sugar sweetened beverages.  No juice (eat the fruit instead).    Exercise:  Studies have shown that the ideal exercise goal is somewhere between 150 to 300 minutes of moderate intensity exercise a week.  Start with exercising 10 minutes every other day and gradually increase physical activity with a goal of at least 150 minutes of moderate intensity exercise a week, divided over at least 3 days a week.  An example of this would be exercising 30 minutes a day, 5 days a week.  Resistance training can increase muscle mass and increase our resting metabolic rate.   FULL BODY resistance training is recommended 2-3 times a week.  Do not do this on consecutive days to allow for muscle recovery.    Aim for a bare minimum 5000 steps, even on days you do not exercise.    Monitoring:   Weigh yourself daily.  If this causes undue stress, then just weigh yourself once a week.  Weigh yourself the same time of the day with the same amount of clothing on.  Preferably this should be done after waking up, before you eat, and with no clothing or minimal clothing on.    Specific Goals:  Goal protein intake of 60-80 grams per day  Gradually increase physical activity to a goal of 5 days per week for 30 minutes of MODERATE intensity PLUS 2 days per week of FULL BODY resistance  training  No sugary beverages. At least 64oz of water daily.    Calorie goal:  4585-1294 percy/day    Return visit:    Zepbound 10mg once weekly; will start maintenance over the next several months every 10 days -14 days       - Side effects of Zepbound include nausea, vomiting, diarrhea, or constipation. Keep an eye on your heart rate while on Zepbound. If you resting heart rate is greater than 100 beats per minutes, please notify me. If you develop severe abdominal pain, stop Zepbound and go to the emergency room, as that could be a sign of pancreatitis.     - Please notify me if you have surgery, upper endoscopy, or colonoscopy scheduled, as we typically hold Zepbound for one week prior to the procedure.     - Zepbound can reduce the effectiveness of oral hormonal birth control (birth control pills). Recommend a barrier backup method such as condoms to prevent pregnancy.      Referral to meal planning with dietitians   Discussed monitoring BP closely at home; will cut down to 5mg of Lisinopril for now and if feeling dizzy or BP consistently <110/60 patient will hold BP medication.   Calorie tracking/calorie deficit  Physical activity - as tolerated with her knee arthralgia  AOMl  Contraception: condoms. Patient will take a pregnancy test prior to starting pharmacotherapy.   Not a candidate for phentermine d/t hx of SVT  Patient interested in restarting GLP1 RA  - Patient denies personal history of pancreatitis. Patient also denies personal and family history of thyroid cancer and multiple endocrine neoplasia type 2 (MEN 2 tumor).   RTC in 4 months   Nutrition RX:  - start tracking intake  - focus on protein- goal is 30 grams per meal; 90 grams per day  - focus on increasing fiber first by increasing vegetable servings per day  - do not skip breakfast and goal water intake 64 oz daily    Physical Activity RX:  - Goal is 150-200 mins of activity weekly.  Try to include at least 2 strength training sessions;  increasing lean body mass will really help you to lose weight and maintain weight loss.              Total time spent reviewing chart, interviewing patient, examining patient, discussing plan, answering all questions, and documentin min.       ______________________________________________________________________        Subjective:   Chief Complaint   Patient presents with    Follow-up     MWM- 3 mo F/u; Waist-35in       HPI: Zina Hdz  is a 35 y.o. female with history of SVT, HTN, RIVER, depression, insomnia, and excess weight, here to pursue weight loss management.  Previous notes and records have been reviewed.    Patient has been on Zepbound 7.5mg x 1 month; no reported side effects. She is getting approximately 30 grams of protein per meal, incorporating shakes as well.   No reported side effects.  She is trying to take the boys and dogs out walking more.     Physical Activity:  Started going back to the gym; 30 mins of cardio  Strength training 1x per week   Joined [facility] gym as well and is doing this 1x per week as well    Food Recall:  B- overnight oats (tends to skip) or protein smoothie, yogurt and granola   L- PB&J sandwich or tuna packet, yogurt and more veggies   D- cooks at home, chicken and vegetable, chili, steak and vegetable     Prediabetes - hemoglobin A1c 5.7 (3/27/24)  TSH WNL    HTN - lisinopril  RIVER - follows with hepatology. Chronically elevated LFTs  Depression -   celexa    Previously took wegovy. In 2023 she had been late a few weeks and restarted a higher dose. Those ultimately led to a hospitalization for uncontrolled nausea and vomiting. Lipase was WNL and no radiographic findings of pancreatitis on CT.  Other than that episode, she felt she tolerated the medicine well and was having good weight loss results. She is interested in restarting.     Decreased physical activity with left knee arthralgia. + RA    Dietary - trying to minimize starches. Portion control     Currently not  using contraception - had IUD removed months ago. Has not had return of menstrual cycle.   Patient will commit to condoms and discuss with OBGYN further options.     HPI  Wt Readings from Last 20 Encounters:   07/10/25 85.1 kg (187 lb 9.6 oz)   05/29/25 87.5 kg (193 lb)   04/24/25 88.6 kg (195 lb 6.4 oz)   04/04/25 90.1 kg (198 lb 9.6 oz)   02/07/25 97 kg (213 lb 12.8 oz)   01/08/25 97.4 kg (214 lb 12.8 oz)   10/02/24 93.4 kg (205 lb 12.8 oz)   08/08/24 99.8 kg (220 lb)   07/23/24 104 kg (229 lb 6.4 oz)   07/22/24 103 kg (228 lb)   05/29/24 104 kg (229 lb 6.4 oz)   05/16/24 104 kg (230 lb 3.2 oz)   04/16/24 105 kg (231 lb 6 oz)   03/13/24 108 kg (239 lb)   08/30/23 98.9 kg (218 lb)   05/23/23 101 kg (222 lb)   04/26/23 104 kg (229 lb)   04/25/23 104 kg (229 lb)   04/17/23 105 kg (231 lb)   03/06/23 105 kg (232 lb)     Excess Weight:  Onset:   Since first pregnancy in 2013   Highest weight: 240/250 (2022)  Current weight: 230  What has been tried: Diet and Exercise and Commercial Weight Loss Programs-ie. Weight Watchers, Leslie Gaurang, Nutrisystem, etc.        Food logging:  B:  skips or yogurt  S: skip  L: sandwich, salads at cafeteria    S: skips  D: tacos, salads, protein (chicken, steak, salmon), veggies (broccoli, carrots) + starch (corn, rice)  S: skips      Dining out: 1-2x  Hydration: Water - 80+ oz    Coffee 1 cup, creamer    Rare soda  Alcohol: Socially. 0-1 drink/month  Smoking: none  Exercise: None, restricted due to arthralgia  Occupation: MA at [employer]      Past Medical History:   Diagnosis Date    Allergic     Anxiety     Depression     Hypertension     SVT (supraventricular tachycardia) (HCC)      Patient denies personal and family history of  pancreatitis, thyroid cancer, MEN-2 tumors.  Denies any hx of glaucoma, seizures, kidney stones, gallstones.  + hx of SVT  Denies uncontrolled anxiety or depression, suicidal behavior or thinking , insomnia or sleep disturbance.     Past Surgical  "History:   Procedure Laterality Date    CARDIAC ELECTROPHYSIOLOGY PROCEDURE N/A 1/30/2023    Procedure: Cardiac eps/svt ablation;  Surgeon: José Loving DO;  Location: [facility];  Service: Cardiology    IR BIOPSY LIVER RANDOM  2/9/2023     The following portions of the patient's history were reviewed and updated as appropriate: allergies, current medications, past family history, past medical history, past social history, past surgical history, and problem list.    Review Of Systems:  Review of Systems   Constitutional:  Negative for fatigue and fever.   HENT:  Negative for sore throat, trouble swallowing and voice change.    Respiratory:  Negative for shortness of breath.    Cardiovascular:  Negative for chest pain.   Gastrointestinal:  Positive for constipation (every other day). Negative for abdominal pain, diarrhea, nausea and vomiting.   Endocrine: Negative for cold intolerance and heat intolerance.   Genitourinary:  Negative for difficulty urinating.   Musculoskeletal:  Negative for arthralgias and back pain.   Psychiatric/Behavioral:  Negative for suicidal ideas. The patient is not nervous/anxious.    All other systems reviewed and are negative.      Objective:  /70 (BP Location: Left arm, Patient Position: Sitting)   Pulse 75   Temp (!) 97.1 °F (36.2 °C) (Tympanic)   Resp 16   Ht 5' 9\" (1.753 m)   Wt 85.1 kg (187 lb 9.6 oz)   BMI 27.70 kg/m²   Physical Exam    Labs and Imaging  Recent labs and imaging have been personally reviewed.  Lab Results   Component Value Date    WBC 13.08 (H) 12/31/2024    HGB 14.4 12/31/2024    HCT 43.5 12/31/2024    MCV 89 12/31/2024     12/31/2024     Lab Results   Component Value Date    SODIUM 138 04/23/2025    K 3.6 04/23/2025     04/23/2025    CO2 25 04/23/2025    AGAP 9 04/23/2025    BUN 7 04/23/2025    CREATININE 0.61 04/23/2025    GLUC 85 10/28/2024    GLUF 101 (H) 04/23/2025    CALCIUM 9.4 04/23/2025    AST 19 04/23/2025    ALT 24 " 04/23/2025    ALKPHOS 79 04/23/2025    TP 7.4 04/23/2025    TBILI 0.47 04/23/2025    EGFR 119 04/23/2025     Lab Results   Component Value Date    HGBA1C 5.7 (H) 03/27/2024     Lab Results   Component Value Date    NFU9DQQNQLFL 2.761 12/31/2024    TSH 1.53 04/09/2022     Lab Results   Component Value Date    CHOLESTEROL 155 03/27/2024     Lab Results   Component Value Date    HDL 25 (L) 03/27/2024     Lab Results   Component Value Date    TRIG 155 (H) 03/27/2024     Lab Results   Component Value Date    LDLCALC 99 03/27/2024

## 2025-07-11 ENCOUNTER — APPOINTMENT (OUTPATIENT)
Dept: LAB | Facility: CLINIC | Age: 35
End: 2025-07-11
Payer: COMMERCIAL

## 2025-07-11 ENCOUNTER — APPOINTMENT (OUTPATIENT)
Dept: LAB | Facility: CLINIC | Age: 35
End: 2025-07-11
Attending: PREVENTIVE MEDICINE
Payer: COMMERCIAL

## 2025-07-11 DIAGNOSIS — Z00.00 HEALTHCARE MAINTENANCE: ICD-10-CM

## 2025-07-11 DIAGNOSIS — I10 HYPERTENSION, UNSPECIFIED TYPE: ICD-10-CM

## 2025-07-11 DIAGNOSIS — Z00.8 HEALTH EXAMINATION IN POPULATION SURVEY: ICD-10-CM

## 2025-07-11 DIAGNOSIS — Z13.1 DIABETES MELLITUS SCREENING: ICD-10-CM

## 2025-07-11 LAB
ALBUMIN SERPL BCG-MCNC: 4.4 G/DL (ref 3.5–5)
ALP SERPL-CCNC: 71 U/L (ref 34–104)
ALT SERPL W P-5'-P-CCNC: 14 U/L (ref 7–52)
ANION GAP SERPL CALCULATED.3IONS-SCNC: 5 MMOL/L (ref 4–13)
AST SERPL W P-5'-P-CCNC: 14 U/L (ref 13–39)
BASOPHILS # BLD AUTO: 0.03 THOUSANDS/ÂΜL (ref 0–0.1)
BASOPHILS NFR BLD AUTO: 0 % (ref 0–1)
BILIRUB SERPL-MCNC: 0.72 MG/DL (ref 0.2–1)
BUN SERPL-MCNC: 8 MG/DL (ref 5–25)
CALCIUM SERPL-MCNC: 9.1 MG/DL (ref 8.4–10.2)
CHLORIDE SERPL-SCNC: 108 MMOL/L (ref 96–108)
CHOLEST SERPL-MCNC: 153 MG/DL (ref ?–200)
CO2 SERPL-SCNC: 24 MMOL/L (ref 21–32)
CREAT SERPL-MCNC: 0.59 MG/DL (ref 0.6–1.3)
EOSINOPHIL # BLD AUTO: 0.02 THOUSAND/ÂΜL (ref 0–0.61)
EOSINOPHIL NFR BLD AUTO: 0 % (ref 0–6)
ERYTHROCYTE [DISTWIDTH] IN BLOOD BY AUTOMATED COUNT: 13.2 % (ref 11.6–15.1)
EST. AVERAGE GLUCOSE BLD GHB EST-MCNC: 105 MG/DL
GFR SERPL CREATININE-BSD FRML MDRD: 120 ML/MIN/1.73SQ M
GLUCOSE P FAST SERPL-MCNC: 80 MG/DL (ref 65–99)
HBA1C MFR BLD: 5.3 %
HCT VFR BLD AUTO: 39.9 % (ref 34.8–46.1)
HDLC SERPL-MCNC: 31 MG/DL
HGB BLD-MCNC: 13.4 G/DL (ref 11.5–15.4)
IMM GRANULOCYTES # BLD AUTO: 0.03 THOUSAND/UL (ref 0–0.2)
IMM GRANULOCYTES NFR BLD AUTO: 0 % (ref 0–2)
LDLC SERPL CALC-MCNC: 103 MG/DL (ref 0–100)
LYMPHOCYTES # BLD AUTO: 2.58 THOUSANDS/ÂΜL (ref 0.6–4.47)
LYMPHOCYTES NFR BLD AUTO: 25 % (ref 14–44)
MCH RBC QN AUTO: 28.5 PG (ref 26.8–34.3)
MCHC RBC AUTO-ENTMCNC: 33.6 G/DL (ref 31.4–37.4)
MCV RBC AUTO: 85 FL (ref 82–98)
MONOCYTES # BLD AUTO: 0.37 THOUSAND/ÂΜL (ref 0.17–1.22)
MONOCYTES NFR BLD AUTO: 4 % (ref 4–12)
NEUTROPHILS # BLD AUTO: 7.26 THOUSANDS/ÂΜL (ref 1.85–7.62)
NEUTS SEG NFR BLD AUTO: 71 % (ref 43–75)
NRBC BLD AUTO-RTO: 0 /100 WBCS
PLATELET # BLD AUTO: 321 THOUSANDS/UL (ref 149–390)
PMV BLD AUTO: 10.6 FL (ref 8.9–12.7)
POTASSIUM SERPL-SCNC: 3.9 MMOL/L (ref 3.5–5.3)
PROT SERPL-MCNC: 7.5 G/DL (ref 6.4–8.4)
RBC # BLD AUTO: 4.7 MILLION/UL (ref 3.81–5.12)
SODIUM SERPL-SCNC: 137 MMOL/L (ref 135–147)
TRIGL SERPL-MCNC: 96 MG/DL (ref ?–150)
TSH SERPL DL<=0.05 MIU/L-ACNC: 1.86 UIU/ML (ref 0.45–4.5)
WBC # BLD AUTO: 10.29 THOUSAND/UL (ref 4.31–10.16)

## 2025-07-11 PROCEDURE — 36415 COLL VENOUS BLD VENIPUNCTURE: CPT

## 2025-07-11 PROCEDURE — 85025 COMPLETE CBC W/AUTO DIFF WBC: CPT

## 2025-07-11 PROCEDURE — 80053 COMPREHEN METABOLIC PANEL: CPT

## 2025-07-11 PROCEDURE — 84443 ASSAY THYROID STIM HORMONE: CPT

## 2025-07-11 PROCEDURE — 83036 HEMOGLOBIN GLYCOSYLATED A1C: CPT

## 2025-07-11 PROCEDURE — 80061 LIPID PANEL: CPT

## 2025-07-16 ENCOUNTER — OFFICE VISIT (OUTPATIENT)
Dept: BEHAVIORAL/MENTAL HEALTH CLINIC | Facility: CLINIC | Age: 35
End: 2025-07-16
Payer: COMMERCIAL

## 2025-07-16 DIAGNOSIS — F32.A DEPRESSION, UNSPECIFIED DEPRESSION TYPE: ICD-10-CM

## 2025-07-16 DIAGNOSIS — F41.9 ANXIETY: ICD-10-CM

## 2025-07-16 PROCEDURE — 90791 PSYCH DIAGNOSTIC EVALUATION: CPT | Performed by: COUNSELOR

## 2025-07-16 NOTE — BH CRISIS PLAN
"Client Name: Kayleigh Hdz       Client YOB: 1990    [facility]      Creation Date: 7/16/25 Update Date: 7/16/25   Created By: Miguel Bynum LPC Last Updated By: Migeul Bynum LPC      Step 1: Warning Signs:   Warning Signs   Refusing to function.   Emotionally overwhelmed   \"snapping more\"            Step 2: Internal Coping Strategies:   Internal Coping Strategies   Walk.   Mixed Martial Arts.   Music.   Read.            Step 3: People and social settings that provide distraction:   Name Contact Information   Milan (Best Friend) In phone   Kids In phone- Live with them.    Places   [employer].   Gym.           Step 4: People whom I can ask for help during a crisis:      Name Contact Information    Roman (Boyfriend) In phone.      Step 5: Professionals or agencies I can contact during a crisis:      Clinican/Agency Name Phone Emergency Contact    Miguel Bynum/ [facility] [phone]     MONTY Gutierrez/ [facility]- [place] [phone]       [facility] Emergency Department Emergency Department Phone Emergency Department Address    [facility] Emergency Room [phone] [address] [place] [address]        Crisis Phone Numbers:   Suicide Prevention Lifeline: Call or Text  820 Crisis Text Line: Text HOME to 521-179   Please note: Some [facility] do not have a separate number for Child/Adolescent specific crisis. If your county is not listed under Child/Adolescent, please call the adult number for your county      Adult Crisis Numbers: Child/Adolescent Crisis Numbers   [facility]: [phone] [facility]: [phone]   [facility]: [phone] [facility]: [phone]   [facility]: [phone] [place]: [phone]   [facility]: [phone] [place]: [phone]   [place]: [phone]   [facility]: [phone]   [facility]: [phone]   [facility] " Services: [phone] (daytime) [phone] (after hours, weekends, holidays)      Step 6: Making the environment safer (plan for lethal means safety):   Patient did not identify any lethal methods: Yes     Optional: What is most important to me and worth living for?   My kids.      [facility] Safety Plan. Verenice Iniguez and Avtar Singh. Used with permission of the authors.

## 2025-07-16 NOTE — BH TREATMENT PLAN
"Outpatient Behavioral Health Psychotherapy Treatment Plan    Kayleigh Hdz  1990     Date of Initial Psychotherapy Assessment: 07/16/2025   Date of Current Treatment Plan: 07/16/25  Treatment Plan Target Date: 01/16/2026  Treatment Plan Expiration Date: 01/16/2026    Diagnosis:   1. Anxiety  Ambulatory referral to Psych Services      2. Depression, unspecified depression type  Ambulatory referral to Psych Services          Area(s) of Need: Symptom management, coping skills, communication styles, stress management.     Long Term Goal 1 (in the client's own words): \"Finding better mechanisms for when I feel overwhelmed.\"    Stage of Change: Action    Target Date for completion: 01/16/2026     Anticipated therapeutic modalities: CBT, Client Centered Therapy, Solution Focused Therapy, Mindfulness Based Therapy.      People identified to complete this goal: Clinician and client.       Objective 1: (identify the means of measuring success in meeting the objective): Client will attend all counseling sessions as scheduled.       Objective 2: (identify the means of measuring success in meeting the objective): Client will attend all medication management appointments as scheduled and take medication as prescribed, client will also communicate with prescriber as needed.     Objective 3: (identify the means of measuring success in meeting the objective): Client will learn and utilize at least 3 new coping skills over the next 3 months.      Objective 4: (identify the means of measuring success in meeting the objective): Client will report a decrease in \"snapping\" at loved ones over the next 6 months.      Long Term Goal 2 (in the client's own words): \"Learn how to respond better to my ex and not be as defensive\"    Stage of Change: Action    Target Date for completion: 01/16/2026     Anticipated therapeutic modalities: CBT, Client Centered Therapy, Solution Focused Therapy, Mindfulness Based Therapy.      People identified to " complete this goal: Clinician and client.       Objective 1: (identify the means of measuring success in meeting the objective): Client will attend all counseling sessions as scheduled.       Objective 2: (identify the means of measuring success in meeting the objective): Client will attend all medication management appointments as scheduled and take medication as prescribed, client will also communicate with prescriber as needed.     Objective 3: (identify the means of measuring success in meeting the objective): Process past struggles in the relationship.      Objective 4: (identify the means of measuring success in meeting the objective):  Client will learn how to communicate with her ex in a more effective way.       I am currently under the care of a [facility] psychiatric provider: yes    My [facility] psychiatric provider is: MONTY Gutierrez - As of 8/14/2025    I am currently taking psychiatric medications: Yes, as prescribed    I feel that I will be ready for discharge from mental health care when I reach the following (measurable goal/objective): When I reach my goals.    For children and adults who have a legal guardian:   Has there been any change to custody orders and/or guardianship status? N/A. If yes, attach updated documentation.    I have created my Crisis Plan and have been offered a copy of this plan    Behavioral Health Treatment Plan [facility]: Diagnosis and Treatment Plan explained to Kayleigh Hdz acknowledges an understanding of their diagnosis. Kayleigh Hdz agrees to this treatment plan.    I have been offered a copy of this Treatment Plan. yes
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
## 2025-07-30 ENCOUNTER — SOCIAL WORK (OUTPATIENT)
Dept: BEHAVIORAL/MENTAL HEALTH CLINIC | Facility: CLINIC | Age: 35
End: 2025-07-30
Payer: COMMERCIAL

## 2025-07-30 DIAGNOSIS — F32.A DEPRESSION, UNSPECIFIED DEPRESSION TYPE: Primary | ICD-10-CM

## 2025-07-30 PROCEDURE — 90837 PSYTX W PT 60 MINUTES: CPT | Performed by: COUNSELOR

## 2025-08-04 ENCOUNTER — TELEPHONE (OUTPATIENT)
Dept: BEHAVIORAL/MENTAL HEALTH CLINIC | Facility: CLINIC | Age: 35
End: 2025-08-04

## 2025-08-04 ENCOUNTER — TELEPHONE (OUTPATIENT)
Age: 35
End: 2025-08-04

## 2025-08-05 DIAGNOSIS — R10.9 ABDOMINAL PAIN: ICD-10-CM

## 2025-08-05 DIAGNOSIS — R11.2 NAUSEA AND VOMITING, UNSPECIFIED VOMITING TYPE: ICD-10-CM

## 2025-08-06 RX ORDER — ONDANSETRON 4 MG/1
4 TABLET, FILM COATED ORAL EVERY 8 HOURS PRN
Qty: 20 TABLET | Refills: 0 | Status: SHIPPED | OUTPATIENT
Start: 2025-08-06 | End: 2025-08-16

## 2025-08-07 RX ORDER — METOCLOPRAMIDE 5 MG/1
5 TABLET ORAL 3 TIMES DAILY PRN
Qty: 30 TABLET | Refills: 0 | Status: SHIPPED | OUTPATIENT
Start: 2025-08-07

## (undated) DEVICE — CATH ABLATION SAFIRE 7FR 4MM LG CRL

## (undated) DEVICE — CATH EP  INQUIRY 6F 2-5-2MM  LRG CRV STERABLE DECAPOLAR 110CM

## (undated) DEVICE — REF PATCH ENSITE X

## (undated) DEVICE — PINNACLE INTRODUCER SHEATH: Brand: PINNACLE

## (undated) DEVICE — CATH EP 5FR QUAD SUPREME CRD

## (undated) DEVICE — CATH EP 5FR QUAD SUPREME JSN

## (undated) DEVICE — GUIDE SHEATH SRO 8.5 FR